# Patient Record
Sex: MALE | Race: WHITE | NOT HISPANIC OR LATINO | Employment: FULL TIME | URBAN - METROPOLITAN AREA
[De-identification: names, ages, dates, MRNs, and addresses within clinical notes are randomized per-mention and may not be internally consistent; named-entity substitution may affect disease eponyms.]

---

## 2017-02-23 ENCOUNTER — RESULTS ENCOUNTER (OUTPATIENT)
Dept: FAMILY MEDICINE CLINIC | Facility: CLINIC | Age: 42
End: 2017-02-23

## 2017-02-23 DIAGNOSIS — R73.01 IFG (IMPAIRED FASTING GLUCOSE): ICD-10-CM

## 2017-02-23 DIAGNOSIS — E55.9 VITAMIN D DEFICIENCY: ICD-10-CM

## 2017-02-23 DIAGNOSIS — M1A.0710 IDIOPATHIC CHRONIC GOUT OF RIGHT FOOT WITHOUT TOPHUS: ICD-10-CM

## 2017-06-22 RX ORDER — ALLOPURINOL 300 MG/1
TABLET ORAL
Qty: 60 TABLET | Refills: 0 | OUTPATIENT
Start: 2017-06-22

## 2017-07-14 RX ORDER — ALLOPURINOL 300 MG/1
300 TABLET ORAL 2 TIMES DAILY
Qty: 60 TABLET | Refills: 0 | Status: SHIPPED | OUTPATIENT
Start: 2017-07-14 | End: 2017-08-01 | Stop reason: SDUPTHER

## 2017-07-25 LAB
25(OH)D3+25(OH)D2 SERPL-MCNC: 38.7 NG/ML (ref 30–100)
BUN SERPL-MCNC: 12 MG/DL (ref 6–20)
BUN/CREAT SERPL: 14.1 (ref 7–25)
CALCIUM SERPL-MCNC: 10.3 MG/DL (ref 8.6–10.5)
CHLORIDE SERPL-SCNC: 101 MMOL/L (ref 98–107)
CO2 SERPL-SCNC: 26.4 MMOL/L (ref 22–29)
CREAT SERPL-MCNC: 0.85 MG/DL (ref 0.76–1.27)
GLUCOSE SERPL-MCNC: 103 MG/DL (ref 65–99)
HBA1C MFR BLD: 4.8 % (ref 4.8–5.6)
POTASSIUM SERPL-SCNC: 4.2 MMOL/L (ref 3.5–5.2)
SODIUM SERPL-SCNC: 142 MMOL/L (ref 136–145)
URATE SERPL-MCNC: 6.6 MG/DL (ref 3.4–7)

## 2017-08-01 ENCOUNTER — OFFICE VISIT (OUTPATIENT)
Dept: FAMILY MEDICINE CLINIC | Facility: CLINIC | Age: 42
End: 2017-08-01

## 2017-08-01 VITALS
TEMPERATURE: 98.5 F | SYSTOLIC BLOOD PRESSURE: 126 MMHG | WEIGHT: 262 LBS | HEART RATE: 75 BPM | DIASTOLIC BLOOD PRESSURE: 80 MMHG | HEIGHT: 68 IN | BODY MASS INDEX: 39.71 KG/M2 | OXYGEN SATURATION: 98 %

## 2017-08-01 DIAGNOSIS — E55.9 VITAMIN D DEFICIENCY: ICD-10-CM

## 2017-08-01 DIAGNOSIS — R73.01 IFG (IMPAIRED FASTING GLUCOSE): ICD-10-CM

## 2017-08-01 DIAGNOSIS — M1A.0710 IDIOPATHIC CHRONIC GOUT OF RIGHT FOOT WITHOUT TOPHUS: Primary | ICD-10-CM

## 2017-08-01 PROCEDURE — 99214 OFFICE O/P EST MOD 30 MIN: CPT | Performed by: FAMILY MEDICINE

## 2017-08-01 RX ORDER — ALLOPURINOL 300 MG/1
300 TABLET ORAL 2 TIMES DAILY
Qty: 180 TABLET | Refills: 3 | Status: SHIPPED | OUTPATIENT
Start: 2017-08-01 | End: 2017-12-20 | Stop reason: SDUPTHER

## 2017-08-01 NOTE — PROGRESS NOTES
Subjective   Wang Tee is a 42 y.o. male.   Chief Complaint   Patient presents with   • Gout   • Vitamin D Deficiency   • Hyperglycemia       History of Present Illness     #1 gout-last episode in 2013. No more episodes since he started allopurinol. No flareups since last office visit.  He is on allopurinol 300 mg twice a day. He takes it everyday. He reports no side effects.  Uric acid at 6.6.     #1 vitamin D deficiency-patient is on vitamin D 3 at 2000 units a day.  Vitamin D is at 38.7.     #3 impaired fasting glucose-fasting blood sugar 103.  A1c at 4.8.   Patient lost 4 pounds from last office visit.  He did it by decreasing amount of bread and pasta in his diet.  He exercises twice a week for 15 minutes on stationary bike.    The following portions of the patient's history were reviewed and updated as appropriate: allergies, current medications, past family history, past medical history, past social history, past surgical history and problem list.    Review of Systems   Constitutional: Negative.    Respiratory: Negative.    Cardiovascular: Negative.    Musculoskeletal: Negative for arthralgias.         Objective   Wt Readings from Last 3 Encounters:   08/01/17 262 lb (119 kg)   08/23/16 268 lb (122 kg)   05/03/16 255 lb (116 kg)      Vitals:    08/01/17 1252   BP: 126/80   Pulse: 75   Temp: 98.5 °F (36.9 °C)   SpO2: 98%     Temp Readings from Last 3 Encounters:   08/01/17 98.5 °F (36.9 °C)   08/23/16 98.4 °F (36.9 °C)   05/03/16 98.5 °F (36.9 °C)     BP Readings from Last 3 Encounters:   08/01/17 126/80   08/23/16 122/68   05/03/16 120/60     Pulse Readings from Last 3 Encounters:   08/01/17 75   08/23/16 75   05/03/16 105       Physical Exam   Constitutional: He is oriented to person, place, and time. He appears well-developed and well-nourished.   Obese.   HENT:   Head: Normocephalic and atraumatic.   Neck: Neck supple. Carotid bruit is not present. No thyromegaly present.   Cardiovascular: Normal  rate, regular rhythm and normal heart sounds.    Pulmonary/Chest: Effort normal and breath sounds normal.   Neurological: He is alert and oriented to person, place, and time.   Skin: Skin is warm, dry and intact.   Psychiatric: He has a normal mood and affect. His behavior is normal.       Assessment/Plan   Wang was seen today for gout, vitamin d deficiency and hyperglycemia.    Diagnoses and all orders for this visit:    Idiopathic chronic gout of right foot without tophus  -     CBC (No Diff); Future  -     Comprehensive Metabolic Panel; Future  -     Thyroid Cascade Profile; Future    IFG (impaired fasting glucose)  -     CBC (No Diff); Future  -     Comprehensive Metabolic Panel; Future  -     Lipid Panel With LDL / HDL Ratio; Future  -     Thyroid Cascade Profile; Future  -     Hemoglobin A1c; Future    Vitamin D deficiency    Other orders  -     allopurinol (ZYLOPRIM) 300 MG tablet; Take 1 tablet by mouth 2 (Two) Times a Day.        #1 gout-will continue current treatment.  Follow-up in 12 months.      #2 impaired fasting glucose-good job with weight loss, continue to work on it.  Increase exercise to at least 5 days a week for 30 minutes.  Follow-up in 6 months.  Labs before office visit.      #3 vitamin D deficiency-controlled.  Continue same.  Follow-up in 6-12 months.

## 2017-12-20 RX ORDER — ALLOPURINOL 300 MG/1
TABLET ORAL
Qty: 180 TABLET | Refills: 0 | Status: ON HOLD | OUTPATIENT
Start: 2017-12-20 | End: 2021-11-05

## 2018-02-01 ENCOUNTER — RESULTS ENCOUNTER (OUTPATIENT)
Dept: FAMILY MEDICINE CLINIC | Facility: CLINIC | Age: 43
End: 2018-02-01

## 2018-02-01 DIAGNOSIS — R73.01 IFG (IMPAIRED FASTING GLUCOSE): ICD-10-CM

## 2018-02-01 DIAGNOSIS — M1A.0710 IDIOPATHIC CHRONIC GOUT OF RIGHT FOOT WITHOUT TOPHUS: ICD-10-CM

## 2018-02-09 ENCOUNTER — HOSPITAL ENCOUNTER (OUTPATIENT)
Dept: GENERAL RADIOLOGY | Facility: HOSPITAL | Age: 43
Discharge: HOME | End: 2018-02-09
Admitting: FAMILY MEDICINE

## 2018-02-09 ENCOUNTER — OFFICE VISIT (OUTPATIENT)
Dept: INTERNAL MEDICINE | Facility: CLINIC | Age: 43
End: 2018-02-09

## 2018-02-09 VITALS
WEIGHT: 262 LBS | TEMPERATURE: 98 F | DIASTOLIC BLOOD PRESSURE: 84 MMHG | HEART RATE: 88 BPM | HEIGHT: 68 IN | BODY MASS INDEX: 39.71 KG/M2 | SYSTOLIC BLOOD PRESSURE: 130 MMHG | OXYGEN SATURATION: 98 %

## 2018-02-09 DIAGNOSIS — R06.02 SOB (SHORTNESS OF BREATH): ICD-10-CM

## 2018-02-09 DIAGNOSIS — R06.02 SOB (SHORTNESS OF BREATH): Primary | ICD-10-CM

## 2018-02-09 PROCEDURE — 99213 OFFICE O/P EST LOW 20 MIN: CPT | Performed by: FAMILY MEDICINE

## 2018-02-09 PROCEDURE — 71046 X-RAY EXAM CHEST 2 VIEWS: CPT

## 2018-02-09 NOTE — PROGRESS NOTES
Subjective   Wang Tee is a 42 y.o. male.   Chief Complaint   Patient presents with   • Shortness of Breath       History of Present Illness     #1 SOB-started about a month ago.  It occurs only while patient sits.  It is not associated with exertion.  It lasts minutes.  He feels that he cannot catch his breath.  It is hard to take a deep breath.  There is no cough, no wheezing associated with it, no chest pain, no palpitations, no dizziness.  He has no nasal congestion, no fever.  He has mildly runny nose with clear discharge.  He has no history of asthma.  Does not smoke cigarettes.  He sees his psychiatrist regularly.  No recent adjustment in medications except of decrease in dose of Risperdal from 2 to 1 mg a day. He does not feel anxious.    The following portions of the patient's history were reviewed and updated as appropriate: allergies, current medications, past family history, past medical history, past social history, past surgical history and problem list.    Review of Systems   Constitutional: Negative for chills and fever.   HENT: Positive for rhinorrhea. Negative for congestion, postnasal drip and sore throat.    Respiratory: Positive for shortness of breath. Negative for cough and wheezing.    Cardiovascular: Negative.          Objective   Wt Readings from Last 3 Encounters:   02/09/18 119 kg (262 lb)   08/01/17 119 kg (262 lb)   08/23/16 122 kg (268 lb)      Vitals:    02/09/18 1543   BP: 130/84   Pulse: 88   Temp: 98 °F (36.7 °C)   SpO2: 98%     Temp Readings from Last 3 Encounters:   02/09/18 98 °F (36.7 °C)   08/01/17 98.5 °F (36.9 °C)   08/23/16 98.4 °F (36.9 °C)     BP Readings from Last 3 Encounters:   02/09/18 130/84   08/01/17 126/80   08/23/16 122/68     Pulse Readings from Last 3 Encounters:   02/09/18 88   08/01/17 75   08/23/16 75       Physical Exam   Constitutional: He is oriented to person, place, and time. He appears well-developed and well-nourished.   HENT:   Head:  Normocephalic and atraumatic.   Right Ear: Tympanic membrane, external ear and ear canal normal.   Left Ear: Tympanic membrane, external ear and ear canal normal.   Nose: Nose normal.   Mouth/Throat: No posterior oropharyngeal erythema.   Neck: Neck supple. Carotid bruit is not present. No thyromegaly present.   Cardiovascular: Normal rate, regular rhythm and normal heart sounds.    Pulmonary/Chest: Effort normal and breath sounds normal.   Lymphadenopathy:     He has no cervical adenopathy.   Neurological: He is alert and oriented to person, place, and time.   Skin: Skin is warm, dry and intact.   Psychiatric: He has a normal mood and affect. His behavior is normal.       Assessment/Plan   Wang was seen today for shortness of breath.    Diagnoses and all orders for this visit:    SOB (shortness of breath)  -     XR Chest PA & Lateral; Future        #1 SOB - spirometry in the office negative except of restriction which most likely secondary to body habitus.  We are checking chest x-ray.  If labs are negative  patient will talk to his psychiatrist about psychotherapy for anxiety.

## 2018-02-14 DIAGNOSIS — R06.02 SOB (SHORTNESS OF BREATH): ICD-10-CM

## 2018-02-14 PROCEDURE — 94010 BREATHING CAPACITY TEST: CPT | Performed by: FAMILY MEDICINE

## 2018-02-26 ENCOUNTER — APPOINTMENT (OUTPATIENT)
Dept: GENERAL RADIOLOGY | Facility: HOSPITAL | Age: 43
End: 2018-02-26

## 2018-02-26 ENCOUNTER — HOSPITAL ENCOUNTER (EMERGENCY)
Facility: HOSPITAL | Age: 43
Discharge: HOME | End: 2018-02-26
Attending: EMERGENCY MEDICINE | Admitting: EMERGENCY MEDICINE

## 2018-02-26 ENCOUNTER — APPOINTMENT (OUTPATIENT)
Dept: CT IMAGING | Facility: HOSPITAL | Age: 43
End: 2018-02-26

## 2018-02-26 VITALS
WEIGHT: 250 LBS | HEIGHT: 68 IN | DIASTOLIC BLOOD PRESSURE: 92 MMHG | HEART RATE: 86 BPM | BODY MASS INDEX: 37.89 KG/M2 | OXYGEN SATURATION: 96 % | TEMPERATURE: 98.1 F | RESPIRATION RATE: 20 BRPM | SYSTOLIC BLOOD PRESSURE: 136 MMHG

## 2018-02-26 DIAGNOSIS — R06.02 SHORTNESS OF BREATH: Primary | ICD-10-CM

## 2018-02-26 DIAGNOSIS — R06.00 DYSPNEA, UNSPECIFIED TYPE: Primary | ICD-10-CM

## 2018-02-26 LAB
ALBUMIN SERPL-MCNC: 4.8 G/DL (ref 3.5–5.2)
ALBUMIN/GLOB SERPL: 1.7 G/DL
ALP SERPL-CCNC: 67 U/L (ref 39–117)
ALT SERPL W P-5'-P-CCNC: 52 U/L (ref 1–41)
ANION GAP SERPL CALCULATED.3IONS-SCNC: 12 MMOL/L
AST SERPL-CCNC: 24 U/L (ref 1–40)
BASOPHILS # BLD AUTO: 0.01 10*3/MM3 (ref 0–0.2)
BASOPHILS NFR BLD AUTO: 0.1 % (ref 0–1.5)
BILIRUB SERPL-MCNC: 1.1 MG/DL (ref 0.1–1.2)
BUN BLD-MCNC: 11 MG/DL (ref 6–20)
BUN/CREAT SERPL: 14.5 (ref 7–25)
CALCIUM SPEC-SCNC: 9.9 MG/DL (ref 8.6–10.5)
CHLORIDE SERPL-SCNC: 96 MMOL/L (ref 98–107)
CO2 SERPL-SCNC: 27 MMOL/L (ref 22–29)
CREAT BLD-MCNC: 0.76 MG/DL (ref 0.76–1.27)
DEPRECATED RDW RBC AUTO: 44.4 FL (ref 37–54)
EOSINOPHIL # BLD AUTO: 0.01 10*3/MM3 (ref 0–0.7)
EOSINOPHIL NFR BLD AUTO: 0.1 % (ref 0.3–6.2)
ERYTHROCYTE [DISTWIDTH] IN BLOOD BY AUTOMATED COUNT: 13.6 % (ref 11.5–14.5)
GFR SERPL CREATININE-BSD FRML MDRD: 113 ML/MIN/1.73
GLOBULIN UR ELPH-MCNC: 2.9 GM/DL
GLUCOSE BLD-MCNC: 180 MG/DL (ref 65–99)
HCT VFR BLD AUTO: 44.7 % (ref 40.4–52.2)
HGB BLD-MCNC: 14.8 G/DL (ref 13.7–17.6)
HOLD SPECIMEN: NORMAL
HOLD SPECIMEN: NORMAL
IMM GRANULOCYTES # BLD: 0.03 10*3/MM3 (ref 0–0.03)
IMM GRANULOCYTES NFR BLD: 0.3 % (ref 0–0.5)
LYMPHOCYTES # BLD AUTO: 1.32 10*3/MM3 (ref 0.9–4.8)
LYMPHOCYTES NFR BLD AUTO: 14.7 % (ref 19.6–45.3)
MCH RBC QN AUTO: 30.4 PG (ref 27–32.7)
MCHC RBC AUTO-ENTMCNC: 33.1 G/DL (ref 32.6–36.4)
MCV RBC AUTO: 91.8 FL (ref 79.8–96.2)
MONOCYTES # BLD AUTO: 0.4 10*3/MM3 (ref 0.2–1.2)
MONOCYTES NFR BLD AUTO: 4.5 % (ref 5–12)
NEUTROPHILS # BLD AUTO: 7.21 10*3/MM3 (ref 1.9–8.1)
NEUTROPHILS NFR BLD AUTO: 80.3 % (ref 42.7–76)
NT-PROBNP SERPL-MCNC: 14.4 PG/ML (ref 5–450)
PLATELET # BLD AUTO: 260 10*3/MM3 (ref 140–500)
PMV BLD AUTO: 9.1 FL (ref 6–12)
POTASSIUM BLD-SCNC: 3.9 MMOL/L (ref 3.5–5.2)
PROT SERPL-MCNC: 7.7 G/DL (ref 6–8.5)
RBC # BLD AUTO: 4.87 10*6/MM3 (ref 4.6–6)
SODIUM BLD-SCNC: 135 MMOL/L (ref 136–145)
TROPONIN T SERPL-MCNC: <0.01 NG/ML (ref 0–0.03)
WBC NRBC COR # BLD: 8.98 10*3/MM3 (ref 4.5–10.7)
WHOLE BLOOD HOLD SPECIMEN: NORMAL
WHOLE BLOOD HOLD SPECIMEN: NORMAL

## 2018-02-26 PROCEDURE — 93005 ELECTROCARDIOGRAM TRACING: CPT

## 2018-02-26 PROCEDURE — 36415 COLL VENOUS BLD VENIPUNCTURE: CPT | Performed by: EMERGENCY MEDICINE

## 2018-02-26 PROCEDURE — 0 IOPAMIDOL PER 1 ML: Performed by: EMERGENCY MEDICINE

## 2018-02-26 PROCEDURE — 85025 COMPLETE CBC W/AUTO DIFF WBC: CPT | Performed by: EMERGENCY MEDICINE

## 2018-02-26 PROCEDURE — 83880 ASSAY OF NATRIURETIC PEPTIDE: CPT | Performed by: EMERGENCY MEDICINE

## 2018-02-26 PROCEDURE — 80053 COMPREHEN METABOLIC PANEL: CPT | Performed by: EMERGENCY MEDICINE

## 2018-02-26 PROCEDURE — 99284 EMERGENCY DEPT VISIT MOD MDM: CPT

## 2018-02-26 PROCEDURE — 71046 X-RAY EXAM CHEST 2 VIEWS: CPT

## 2018-02-26 PROCEDURE — 94640 AIRWAY INHALATION TREATMENT: CPT

## 2018-02-26 PROCEDURE — 71275 CT ANGIOGRAPHY CHEST: CPT

## 2018-02-26 PROCEDURE — 84484 ASSAY OF TROPONIN QUANT: CPT | Performed by: EMERGENCY MEDICINE

## 2018-02-26 RX ORDER — ALBUTEROL SULFATE 2.5 MG/3ML
2.5 SOLUTION RESPIRATORY (INHALATION) ONCE
Status: COMPLETED | OUTPATIENT
Start: 2018-02-26 | End: 2018-02-26

## 2018-02-26 RX ORDER — SODIUM CHLORIDE 0.9 % (FLUSH) 0.9 %
10 SYRINGE (ML) INJECTION AS NEEDED
Status: DISCONTINUED | OUTPATIENT
Start: 2018-02-26 | End: 2018-02-27 | Stop reason: HOSPADM

## 2018-02-26 RX ORDER — ALBUTEROL SULFATE 90 UG/1
2 AEROSOL, METERED RESPIRATORY (INHALATION) EVERY 4 HOURS PRN
Qty: 1 INHALER | Refills: 0 | Status: ON HOLD | OUTPATIENT
Start: 2018-02-26 | End: 2021-11-05

## 2018-02-26 RX ADMIN — ALBUTEROL SULFATE 2.5 MG: 2.5 SOLUTION RESPIRATORY (INHALATION) at 22:10

## 2018-02-26 RX ADMIN — IOPAMIDOL 95 ML: 755 INJECTION, SOLUTION INTRAVENOUS at 21:56

## 2018-02-28 ENCOUNTER — TELEPHONE (OUTPATIENT)
Dept: INTERNAL MEDICINE | Facility: CLINIC | Age: 43
End: 2018-02-28

## 2018-02-28 NOTE — TELEPHONE ENCOUNTER
----- Message from Blanche Solares MD sent at 2/23/2018 12:30 PM EST -----  I'm not sure if ENT would be the most helpful person with his problems.  If he wants to see a specialist I think that more helpful will be pulmonologist. He also should see his psychiatrist because uncontrolled anxiety can cause similar symptoms and his medications were recently reduced.  ----- Message -----     From: Genesis Sotelo     Sent: 2/23/2018   8:38 AM       To: Blanche Solares MD    Patients mother Sheri is calling stating that patient is still having difficulty breathing and wants to know what they should do. She is wondering if you should refer him to an ENT. ENT that is in his insurance network is Dr Rashad Mckeon    DISCUSSED WITH MOTHER AND FATHER

## 2018-04-16 DIAGNOSIS — R06.02 SHORTNESS OF BREATH: Primary | ICD-10-CM

## 2018-10-31 ENCOUNTER — APPOINTMENT (OUTPATIENT)
Dept: GENERAL RADIOLOGY | Facility: HOSPITAL | Age: 43
End: 2018-10-31

## 2018-10-31 ENCOUNTER — HOSPITAL ENCOUNTER (EMERGENCY)
Facility: HOSPITAL | Age: 43
Discharge: HOME | End: 2018-10-31
Attending: EMERGENCY MEDICINE | Admitting: EMERGENCY MEDICINE

## 2018-10-31 VITALS
TEMPERATURE: 99.3 F | BODY MASS INDEX: 34.4 KG/M2 | SYSTOLIC BLOOD PRESSURE: 122 MMHG | HEIGHT: 68 IN | WEIGHT: 227 LBS | DIASTOLIC BLOOD PRESSURE: 90 MMHG | HEART RATE: 90 BPM | RESPIRATION RATE: 18 BRPM | OXYGEN SATURATION: 96 %

## 2018-10-31 DIAGNOSIS — Z43.0 ENCOUNTER FOR ATTENTION TO TRACHEOSTOMY (HCC): ICD-10-CM

## 2018-10-31 DIAGNOSIS — R04.2 HEMOPTYSIS: ICD-10-CM

## 2018-10-31 DIAGNOSIS — R06.00 DYSPNEA, UNSPECIFIED TYPE: Primary | ICD-10-CM

## 2018-10-31 LAB
ALBUMIN SERPL-MCNC: 4.5 G/DL (ref 3.5–5.2)
ALBUMIN/GLOB SERPL: 1.6 G/DL
ALP SERPL-CCNC: 83 U/L (ref 39–117)
ALT SERPL W P-5'-P-CCNC: 136 U/L (ref 1–41)
ANION GAP SERPL CALCULATED.3IONS-SCNC: 14.3 MMOL/L
AST SERPL-CCNC: 131 U/L (ref 1–40)
BASOPHILS # BLD AUTO: 0.01 10*3/MM3 (ref 0–0.2)
BASOPHILS NFR BLD AUTO: 0.1 % (ref 0–1.5)
BILIRUB SERPL-MCNC: 1.7 MG/DL (ref 0.1–1.2)
BUN BLD-MCNC: 12 MG/DL (ref 6–20)
BUN/CREAT SERPL: 14.6 (ref 7–25)
CALCIUM SPEC-SCNC: 9.9 MG/DL (ref 8.6–10.5)
CHLORIDE SERPL-SCNC: 99 MMOL/L (ref 98–107)
CO2 SERPL-SCNC: 25.7 MMOL/L (ref 22–29)
CREAT BLD-MCNC: 0.82 MG/DL (ref 0.76–1.27)
DEPRECATED RDW RBC AUTO: 41.8 FL (ref 37–54)
EOSINOPHIL # BLD AUTO: 0.24 10*3/MM3 (ref 0–0.7)
EOSINOPHIL NFR BLD AUTO: 2.5 % (ref 0.3–6.2)
ERYTHROCYTE [DISTWIDTH] IN BLOOD BY AUTOMATED COUNT: 13.2 % (ref 11.5–14.5)
GFR SERPL CREATININE-BSD FRML MDRD: 103 ML/MIN/1.73
GLOBULIN UR ELPH-MCNC: 2.9 GM/DL
GLUCOSE BLD-MCNC: 114 MG/DL (ref 65–99)
HCT VFR BLD AUTO: 43 % (ref 40.4–52.2)
HGB BLD-MCNC: 14.7 G/DL (ref 13.7–17.6)
IMM GRANULOCYTES # BLD: 0.02 10*3/MM3 (ref 0–0.03)
IMM GRANULOCYTES NFR BLD: 0.2 % (ref 0–0.5)
LITHIUM SERPL-SCNC: 0.8 MMOL/L (ref 0.6–1.2)
LYMPHOCYTES # BLD AUTO: 1.38 10*3/MM3 (ref 0.9–4.8)
LYMPHOCYTES NFR BLD AUTO: 14.2 % (ref 19.6–45.3)
MCH RBC QN AUTO: 29.9 PG (ref 27–32.7)
MCHC RBC AUTO-ENTMCNC: 34.2 G/DL (ref 32.6–36.4)
MCV RBC AUTO: 87.6 FL (ref 79.8–96.2)
MONOCYTES # BLD AUTO: 0.81 10*3/MM3 (ref 0.2–1.2)
MONOCYTES NFR BLD AUTO: 8.4 % (ref 5–12)
NEUTROPHILS # BLD AUTO: 7.26 10*3/MM3 (ref 1.9–8.1)
NEUTROPHILS NFR BLD AUTO: 74.8 % (ref 42.7–76)
PLATELET # BLD AUTO: 280 10*3/MM3 (ref 140–500)
PMV BLD AUTO: 8.8 FL (ref 6–12)
POTASSIUM BLD-SCNC: 3.6 MMOL/L (ref 3.5–5.2)
PROT SERPL-MCNC: 7.4 G/DL (ref 6–8.5)
RBC # BLD AUTO: 4.91 10*6/MM3 (ref 4.6–6)
SODIUM BLD-SCNC: 139 MMOL/L (ref 136–145)
WBC NRBC COR # BLD: 9.7 10*3/MM3 (ref 4.5–10.7)

## 2018-10-31 PROCEDURE — 99285 EMERGENCY DEPT VISIT HI MDM: CPT

## 2018-10-31 PROCEDURE — 80178 ASSAY OF LITHIUM: CPT | Performed by: EMERGENCY MEDICINE

## 2018-10-31 PROCEDURE — 71045 X-RAY EXAM CHEST 1 VIEW: CPT

## 2018-10-31 PROCEDURE — 85025 COMPLETE CBC W/AUTO DIFF WBC: CPT | Performed by: EMERGENCY MEDICINE

## 2018-10-31 PROCEDURE — 94799 UNLISTED PULMONARY SVC/PX: CPT

## 2018-10-31 PROCEDURE — 80053 COMPREHEN METABOLIC PANEL: CPT | Performed by: EMERGENCY MEDICINE

## 2018-10-31 RX ORDER — SODIUM CHLORIDE 0.9 % (FLUSH) 0.9 %
10 SYRINGE (ML) INJECTION AS NEEDED
Status: DISCONTINUED | OUTPATIENT
Start: 2018-10-31 | End: 2018-10-31 | Stop reason: HOSPADM

## 2019-09-24 ENCOUNTER — TELEPHONE (OUTPATIENT)
Dept: INTERNAL MEDICINE | Facility: CLINIC | Age: 44
End: 2019-09-24

## 2019-09-24 DIAGNOSIS — R06.1 STRIDOR: ICD-10-CM

## 2019-09-24 DIAGNOSIS — R06.09 OTHER FORM OF DYSPNEA: ICD-10-CM

## 2019-09-24 DIAGNOSIS — J38.3 OTHER DISEASES OF VOCAL CORDS: Primary | ICD-10-CM

## 2019-09-24 NOTE — TELEPHONE ENCOUNTER
----- Message from Blanche Solares MD sent at 9/24/2019 12:58 PM EDT -----  Regarding: RE: REFERRAL  Ok to RF.  ----- Message -----  From: Elizabeth Grimaldo MA  Sent: 9/24/2019   9:49 AM  To: Blanche Solares MD  Subject: FW: REFERRAL                                         ----- Message -----  From: Kristal Jolly  Sent: 9/24/2019   9:25 AM  To: Elizabeth Grimaldo MA  Subject: REFERRAL                                         Nolvia from [facility] in [place] needs a referral from Dr Solares to see Dr Madiha Richey (NPI# [id number]) for vocal cord dysfunction. Diagnosis codes are J38.3, R06.00 and R06.1.  Nolvia can be reached at [phone] if there are any questions. His insurance won't cover the appt without an referral from his PCP. Thanks

## 2020-04-15 ENCOUNTER — TELEPHONE (OUTPATIENT)
Dept: INTERNAL MEDICINE | Facility: CLINIC | Age: 45
End: 2020-04-15

## 2020-04-15 NOTE — TELEPHONE ENCOUNTER
Pts father/ Ghassan is trying to get patient into [facility] and needs a referral    Please advise    Call back  [phone]

## 2021-08-27 ENCOUNTER — TELEPHONE (OUTPATIENT)
Dept: INTERNAL MEDICINE | Facility: CLINIC | Age: 46
End: 2021-08-27

## 2021-08-27 NOTE — TELEPHONE ENCOUNTER
PATIENT CALLING STATING HE WAS EXPOSED TO SOMEONE THAT MAY BE POSITIVE FOR COVID OVER THE WEEKEND HE STATED THEY HAVE NOT GOTTEN THE TEST RESULTS YET HIS JOB IS WANTING TO KNOW IF HE NEEDS TO BE QUARANTINED.PATIENT IS SUPPOSE TO WORK TODAY     PLEASE ADVISE  [phone]

## 2021-11-05 ENCOUNTER — HOSPITAL ENCOUNTER (OUTPATIENT)
Facility: HOSPITAL | Age: 46
Setting detail: OBSERVATION
Discharge: HOME | End: 2021-11-06
Attending: EMERGENCY MEDICINE | Admitting: EMERGENCY MEDICINE

## 2021-11-05 ENCOUNTER — APPOINTMENT (OUTPATIENT)
Dept: GENERAL RADIOLOGY | Facility: HOSPITAL | Age: 46
End: 2021-11-05

## 2021-11-05 DIAGNOSIS — R07.9 CHEST PAIN, UNSPECIFIED TYPE: Primary | ICD-10-CM

## 2021-11-05 DIAGNOSIS — R94.31 ABNORMAL EKG: ICD-10-CM

## 2021-11-05 LAB
ALBUMIN SERPL-MCNC: 4.6 G/DL (ref 3.5–5.2)
ALBUMIN/GLOB SERPL: 1.6 G/DL
ALP SERPL-CCNC: 74 U/L (ref 39–117)
ALT SERPL W P-5'-P-CCNC: 51 U/L (ref 1–41)
ANION GAP SERPL CALCULATED.3IONS-SCNC: 7.2 MMOL/L (ref 5–15)
AST SERPL-CCNC: 28 U/L (ref 1–40)
BASOPHILS # BLD AUTO: 0.04 10*3/MM3 (ref 0–0.2)
BASOPHILS NFR BLD AUTO: 0.6 % (ref 0–1.5)
BILIRUB SERPL-MCNC: 1.5 MG/DL (ref 0–1.2)
BUN SERPL-MCNC: 14 MG/DL (ref 6–20)
BUN/CREAT SERPL: 15.2 (ref 7–25)
CALCIUM SPEC-SCNC: 9.7 MG/DL (ref 8.6–10.5)
CHLORIDE SERPL-SCNC: 100 MMOL/L (ref 98–107)
CO2 SERPL-SCNC: 28.8 MMOL/L (ref 22–29)
CREAT SERPL-MCNC: 0.92 MG/DL (ref 0.76–1.27)
DEPRECATED RDW RBC AUTO: 37.6 FL (ref 37–54)
EOSINOPHIL # BLD AUTO: 0.18 10*3/MM3 (ref 0–0.4)
EOSINOPHIL NFR BLD AUTO: 2.7 % (ref 0.3–6.2)
ERYTHROCYTE [DISTWIDTH] IN BLOOD BY AUTOMATED COUNT: 12.9 % (ref 12.3–15.4)
ERYTHROCYTE [SEDIMENTATION RATE] IN BLOOD: 1 MM/HR (ref 0–15)
GFR SERPL CREATININE-BSD FRML MDRD: 89 ML/MIN/1.73
GLOBULIN UR ELPH-MCNC: 2.8 GM/DL
GLUCOSE SERPL-MCNC: 90 MG/DL (ref 65–99)
HCT VFR BLD AUTO: 39.6 % (ref 37.5–51)
HGB BLD-MCNC: 14.4 G/DL (ref 13–17.7)
HOLD SPECIMEN: NORMAL
IMM GRANULOCYTES # BLD AUTO: 0.02 10*3/MM3 (ref 0–0.05)
IMM GRANULOCYTES NFR BLD AUTO: 0.3 % (ref 0–0.5)
INR PPP: 0.99 (ref 0.9–1.1)
LITHIUM SERPL-SCNC: 0.4 MMOL/L (ref 0.6–1.2)
LYMPHOCYTES # BLD AUTO: 1.77 10*3/MM3 (ref 0.7–3.1)
LYMPHOCYTES NFR BLD AUTO: 26.3 % (ref 19.6–45.3)
MCH RBC QN AUTO: 30.2 PG (ref 26.6–33)
MCHC RBC AUTO-ENTMCNC: 36.4 G/DL (ref 31.5–35.7)
MCV RBC AUTO: 83 FL (ref 79–97)
MONOCYTES # BLD AUTO: 0.67 10*3/MM3 (ref 0.1–0.9)
MONOCYTES NFR BLD AUTO: 9.9 % (ref 5–12)
NEUTROPHILS NFR BLD AUTO: 4.06 10*3/MM3 (ref 1.7–7)
NEUTROPHILS NFR BLD AUTO: 60.2 % (ref 42.7–76)
NRBC BLD AUTO-RTO: 0 /100 WBC (ref 0–0.2)
NT-PROBNP SERPL-MCNC: 34.5 PG/ML (ref 0–450)
PLATELET # BLD AUTO: 265 10*3/MM3 (ref 140–450)
PMV BLD AUTO: 8.9 FL (ref 6–12)
POTASSIUM SERPL-SCNC: 3.4 MMOL/L (ref 3.5–5.2)
PROT SERPL-MCNC: 7.4 G/DL (ref 6–8.5)
PROTHROMBIN TIME: 12.9 SECONDS (ref 11.7–14.2)
RBC # BLD AUTO: 4.77 10*6/MM3 (ref 4.14–5.8)
SARS-COV-2 RNA PNL SPEC NAA+PROBE: NOT DETECTED
SODIUM SERPL-SCNC: 136 MMOL/L (ref 136–145)
TROPONIN T SERPL-MCNC: <0.01 NG/ML (ref 0–0.03)
WBC # BLD AUTO: 6.74 10*3/MM3 (ref 3.4–10.8)
WHOLE BLOOD HOLD SPECIMEN: NORMAL
WHOLE BLOOD HOLD SPECIMEN: NORMAL

## 2021-11-05 PROCEDURE — 84484 ASSAY OF TROPONIN QUANT: CPT | Performed by: EMERGENCY MEDICINE

## 2021-11-05 PROCEDURE — 85652 RBC SED RATE AUTOMATED: CPT | Performed by: EMERGENCY MEDICINE

## 2021-11-05 PROCEDURE — 85610 PROTHROMBIN TIME: CPT | Performed by: EMERGENCY MEDICINE

## 2021-11-05 PROCEDURE — 84484 ASSAY OF TROPONIN QUANT: CPT | Performed by: NURSE PRACTITIONER

## 2021-11-05 PROCEDURE — 93010 ELECTROCARDIOGRAM REPORT: CPT | Performed by: INTERNAL MEDICINE

## 2021-11-05 PROCEDURE — G0378 HOSPITAL OBSERVATION PER HR: HCPCS

## 2021-11-05 PROCEDURE — 83880 ASSAY OF NATRIURETIC PEPTIDE: CPT | Performed by: NURSE PRACTITIONER

## 2021-11-05 PROCEDURE — 93005 ELECTROCARDIOGRAM TRACING: CPT | Performed by: NURSE PRACTITIONER

## 2021-11-05 PROCEDURE — 85025 COMPLETE CBC W/AUTO DIFF WBC: CPT | Performed by: EMERGENCY MEDICINE

## 2021-11-05 PROCEDURE — 71045 X-RAY EXAM CHEST 1 VIEW: CPT

## 2021-11-05 PROCEDURE — 80178 ASSAY OF LITHIUM: CPT | Performed by: EMERGENCY MEDICINE

## 2021-11-05 PROCEDURE — 87635 SARS-COV-2 COVID-19 AMP PRB: CPT | Performed by: EMERGENCY MEDICINE

## 2021-11-05 PROCEDURE — 99285 EMERGENCY DEPT VISIT HI MDM: CPT

## 2021-11-05 PROCEDURE — 80053 COMPREHEN METABOLIC PANEL: CPT | Performed by: EMERGENCY MEDICINE

## 2021-11-05 PROCEDURE — C9803 HOPD COVID-19 SPEC COLLECT: HCPCS

## 2021-11-05 PROCEDURE — 93005 ELECTROCARDIOGRAM TRACING: CPT | Performed by: EMERGENCY MEDICINE

## 2021-11-05 RX ORDER — LITHIUM CARBONATE 300 MG/1
600 TABLET, FILM COATED, EXTENDED RELEASE ORAL NIGHTLY
Status: DISCONTINUED | OUTPATIENT
Start: 2021-11-05 | End: 2021-11-06 | Stop reason: HOSPADM

## 2021-11-05 RX ORDER — SODIUM CHLORIDE 0.9 % (FLUSH) 0.9 %
10 SYRINGE (ML) INJECTION AS NEEDED
Status: DISCONTINUED | OUTPATIENT
Start: 2021-11-05 | End: 2021-11-06 | Stop reason: HOSPADM

## 2021-11-05 RX ORDER — SODIUM CHLORIDE 0.9 % (FLUSH) 0.9 %
10 SYRINGE (ML) INJECTION EVERY 12 HOURS SCHEDULED
Status: DISCONTINUED | OUTPATIENT
Start: 2021-11-05 | End: 2021-11-06 | Stop reason: HOSPADM

## 2021-11-05 RX ORDER — BENZTROPINE MESYLATE 0.5 MG/1
0.5 TABLET ORAL DAILY
Status: DISCONTINUED | OUTPATIENT
Start: 2021-11-06 | End: 2021-11-05

## 2021-11-05 RX ORDER — IBUPROFEN 800 MG/1
800 TABLET ORAL ONCE
Status: COMPLETED | OUTPATIENT
Start: 2021-11-05 | End: 2021-11-05

## 2021-11-05 RX ORDER — SODIUM CHLORIDE, SODIUM LACTATE, POTASSIUM CHLORIDE, CALCIUM CHLORIDE 600; 310; 30; 20 MG/100ML; MG/100ML; MG/100ML; MG/100ML
100 INJECTION, SOLUTION INTRAVENOUS CONTINUOUS
Status: DISCONTINUED | OUTPATIENT
Start: 2021-11-05 | End: 2021-11-06 | Stop reason: HOSPADM

## 2021-11-05 RX ORDER — RISPERIDONE 1 MG/1
1 TABLET ORAL DAILY
Status: DISCONTINUED | OUTPATIENT
Start: 2021-11-06 | End: 2021-11-05

## 2021-11-05 RX ORDER — COLCHICINE 0.6 MG/1
0.6 TABLET ORAL EVERY 12 HOURS SCHEDULED
Status: DISCONTINUED | OUTPATIENT
Start: 2021-11-05 | End: 2021-11-06 | Stop reason: HOSPADM

## 2021-11-05 RX ADMIN — SODIUM CHLORIDE, PRESERVATIVE FREE 10 ML: 5 INJECTION INTRAVENOUS at 22:49

## 2021-11-05 RX ADMIN — COLCHICINE 0.6 MG: 0.6 TABLET, FILM COATED ORAL at 22:48

## 2021-11-05 RX ADMIN — IBUPROFEN 800 MG: 800 TABLET, FILM COATED ORAL at 19:13

## 2021-11-05 RX ADMIN — SODIUM CHLORIDE, POTASSIUM CHLORIDE, SODIUM LACTATE AND CALCIUM CHLORIDE 100 ML/HR: 600; 310; 30; 20 INJECTION, SOLUTION INTRAVENOUS at 22:48

## 2021-11-05 RX ADMIN — LITHIUM CARBONATE 600 MG: 300 TABLET, FILM COATED, EXTENDED RELEASE ORAL at 22:49

## 2021-11-05 NOTE — ED TRIAGE NOTES
midsternal chest pain started last night, 324 mg ASA and 2 nitro with mild relief.    Pt arrives in triage with mask on. Triage staff wearing N95 masks and goggles.

## 2021-11-05 NOTE — PROGRESS NOTES
19:36 EDT    Very pleasant 46-year-old gentleman who presents with substernal chest pain to the emergency department.  He does not appear to have acute coronary syndrome, however his pattern of pain and subtle EKG changes would suggest pericarditis.  He is in no significant distress and has been treated with ibuprofen so far.  His cardiac enzymes have been negative to this point.    Decision was made to admit the patient to the observation unit for further evaluation and cardiology consult.  We will order an echocardiogram for tomorrow morning and will await recommendations from cardiology further.  He has not had a recent viral illness, but does have gout in his history.    We will try some colchicine for his symptoms which may be helpful in the setting of pericarditis.  We will await cardiology consult and recommendations.  He is hemodynamically stable.  He has a mature tracheostomy which is based on a history of vocal cord dysfunction.  He is in no respiratory distress and the trach is well-maintained and clean.

## 2021-11-05 NOTE — CASE MANAGEMENT/SOCIAL WORK
Discharge Planning Assessment  [facility]     Patient Name: Wang Tee  MRN: [id number]  Today's Date: 11/5/2021    Admit Date: 11/5/2021     Discharge Needs Assessment     Row Name 11/05/21 1900       Living Environment    Lives With parent(s)    Name(s) of Who Lives With Patient Mother, Latricia Tee [phone]    Current Living Arrangements home/apartment/condo    Primary Care Provided by self    Provides Primary Care For no one    Family Caregiver if Needed parent(s)    Family Caregiver Names Latricia Tee    Quality of Family Relationships helpful; involved    Able to Return to Prior Arrangements yes       Resource/Environmental Concerns    Resource/Environmental Concerns none    Transportation Concerns car, none       Transition Planning    Patient/Family Anticipates Transition to home    Patient/Family Anticipated Services at Transition none    Transportation Anticipated car, drives self; family or friend will provide       Discharge Needs Assessment    Readmission Within the Last 30 Days no previous admission in last 30 days    Equipment Currently Used at Home none    Concerns to be Addressed no discharge needs identified; denies needs/concerns at this time    Anticipated Changes Related to Illness none    Equipment Needed After Discharge none    Provided Post Acute Provider List? N/A    Provided Post Acute Provider Quality & Resource List? N/A               Discharge Plan     Row Name 11/05/21 1901       Plan    Plan Face sheet verified, role of CCP explained. Pt is independent in ADL's and does not require any assistive devices. Pt denies any difficulties paying for medicaitons    Provided Post Acute Provider List? N/A    Provided Post Acute Provider Quality & Resource List? N/A    Plan Comments Pt intends to return home upon discharge              Continued Care and Services - Admitted Since 11/5/2021    Coordination has not been started for this encounter.          Demographic Summary    No  documentation.                Functional Status    No documentation.                Psychosocial    No documentation.                Abuse/Neglect    No documentation.                Legal    No documentation.                Substance Abuse    No documentation.                Patient Forms    No documentation.                   Mili Henson RN

## 2021-11-05 NOTE — ED PROVIDER NOTES
EMERGENCY DEPARTMENT ENCOUNTER    Room Number:  17/17  Date of encounter:  11/5/2021  PCP: Blanche Solares MD  Historian: Patient and EMS      HPI:  Chief Complaint: Chest pain        Context: Wang Tee is a 46 y.o. male who presents to the ED c/o intermittent chest pain this morning that has been more constant since he went to work at approximately noon today.  The patient denies a history of heart disease, but does have bipolar disorder and a disorder of the vocal cords and is status post tracheostomy.  The patient describes the pain as a tightness in the center of his chest but denies radiation, diaphoresis, nausea or shortness of breath.  He denies a history of heart problems.  EMS gave the patient aspirin and 2 several nitroglycerin in route which the patient states may have helped with the tightness some.      PAST MEDICAL HISTORY  Active Ambulatory Problems     Diagnosis Date Noted   • Abnormal liver enzymes 05/03/2016   • Primary hypertriglyceridemia 05/03/2016   • Gout 05/03/2016   • Adiposity 05/03/2016   • Bipolar affective disorder (HCC) 05/03/2016   • Vitamin D deficiency 08/23/2016   • IFG (impaired fasting glucose) 08/23/2016     Resolved Ambulatory Problems     Diagnosis Date Noted   • No Resolved Ambulatory Problems     Past Medical History:   Diagnosis Date   • Abnormal serum thyroxine (T4) level    • Bipolar affective (CMS/HCC)    • Disorder of vocal cord    • Elevated liver enzymes          PAST SURGICAL HISTORY  Past Surgical History:   Procedure Laterality Date   • TRACHEOSTOMY           FAMILY HISTORY  Family History   Problem Relation Age of Onset   • Diabetes Mother    • Kidney disease Mother    • Anxiety disorder Other    • Bipolar disorder Other          SOCIAL HISTORY  Social History     Socioeconomic History   • Marital status: Single   Tobacco Use   • Smoking status: Never Smoker   • Smokeless tobacco: Never Used   Substance and Sexual Activity   • Alcohol use: No     Comment:  social   • Drug use: No   • Sexual activity: Defer         ALLERGIES  Sulfa antibiotics and Sulfamethoxazole-trimethoprim        REVIEW OF SYSTEMS  Review of Systems     Patient denies headache, sore throat, neck pain, back pain, abdominal pain, vomiting, diarrhea, lower extremity pain, lower extremity swelling or focal neuro deficit  All systems reviewed and negative except for those discussed in HPI.     PHYSICAL EXAM    I have reviewed the triage vital signs and nursing notes.    ED Triage Vitals [11/05/21 1626]   Temp Heart Rate Resp BP SpO2   98.1 °F (36.7 °C) 88 16 (!) 176/110 100 %      Temp src Heart Rate Source Patient Position BP Location FiO2 (%)   -- -- -- -- --       GENERAL: 46-year-old well developed, well nourished in no acute distress  HENT: NCAT, neck supple, trachea midline, tracheostomy in place and is clean and dry  EYES: no scleral icterus, PERRL, normal conjunctivae  CV: regular rhythm, regular rate, no murmur  RESPIRATORY: unlabored effort, CTAB  ABDOMEN: soft, nontender, nondistended, bowel sounds present  MUSCULOSKELETAL: no gross deformity, no pedal edema, no calf tenderness  NEURO: alert,  sensory and motor function of extremities intact, speech clear, mental status normal  SKIN: warm, dry, no rash  PSYCH:  Appropriate mood and affect      PPE  Pt does not present with symptoms for COVID19; however, I was wearing a 95 mask and goggles throughout all patient interaction.    Vital signs and nursing notes reviewed.      LAB RESULTS  Recent Results (from the past 24 hour(s))   ECG 12 Lead    Collection Time: 11/05/21  4:36 PM   Result Value Ref Range    QT Interval 378 ms   Comprehensive Metabolic Panel    Collection Time: 11/05/21  5:14 PM    Specimen: Blood   Result Value Ref Range    Glucose 90 65 - 99 mg/dL    BUN 14 6 - 20 mg/dL    Creatinine 0.92 0.76 - 1.27 mg/dL    Sodium 136 136 - 145 mmol/L    Potassium 3.4 (L) 3.5 - 5.2 mmol/L    Chloride 100 98 - 107 mmol/L    CO2 28.8 22.0 - 29.0  mmol/L    Calcium 9.7 8.6 - 10.5 mg/dL    Total Protein 7.4 6.0 - 8.5 g/dL    Albumin 4.60 3.50 - 5.20 g/dL    ALT (SGPT) 51 (H) 1 - 41 U/L    AST (SGOT) 28 1 - 40 U/L    Alkaline Phosphatase 74 39 - 117 U/L    Total Bilirubin 1.5 (H) 0.0 - 1.2 mg/dL    eGFR Non African Amer 89 >60 mL/min/1.73    Globulin 2.8 gm/dL    A/G Ratio 1.6 g/dL    BUN/Creatinine Ratio 15.2 7.0 - 25.0    Anion Gap 7.2 5.0 - 15.0 mmol/L   Troponin    Collection Time: 11/05/21  5:14 PM    Specimen: Blood   Result Value Ref Range    Troponin T <0.010 0.000 - 0.030 ng/mL   Protime-INR    Collection Time: 11/05/21  5:14 PM    Specimen: Blood   Result Value Ref Range    Protime 12.9 11.7 - 14.2 Seconds    INR 0.99 0.90 - 1.10   Green Top (Gel)    Collection Time: 11/05/21  5:14 PM   Result Value Ref Range    Extra Tube Hold for add-ons.    Lavender Top    Collection Time: 11/05/21  5:14 PM   Result Value Ref Range    Extra Tube hold for add-on    Light Blue Top    Collection Time: 11/05/21  5:14 PM   Result Value Ref Range    Extra Tube hold for add-on    CBC Auto Differential    Collection Time: 11/05/21  5:14 PM    Specimen: Blood   Result Value Ref Range    WBC 6.74 3.40 - 10.80 10*3/mm3    RBC 4.77 4.14 - 5.80 10*6/mm3    Hemoglobin 14.4 13.0 - 17.7 g/dL    Hematocrit 39.6 37.5 - 51.0 %    MCV 83.0 79.0 - 97.0 fL    MCH 30.2 26.6 - 33.0 pg    MCHC 36.4 (H) 31.5 - 35.7 g/dL    RDW 12.9 12.3 - 15.4 %    RDW-SD 37.6 37.0 - 54.0 fl    MPV 8.9 6.0 - 12.0 fL    Platelets 265 140 - 450 10*3/mm3    Neutrophil % 60.2 42.7 - 76.0 %    Lymphocyte % 26.3 19.6 - 45.3 %    Monocyte % 9.9 5.0 - 12.0 %    Eosinophil % 2.7 0.3 - 6.2 %    Basophil % 0.6 0.0 - 1.5 %    Immature Grans % 0.3 0.0 - 0.5 %    Neutrophils, Absolute 4.06 1.70 - 7.00 10*3/mm3    Lymphocytes, Absolute 1.77 0.70 - 3.10 10*3/mm3    Monocytes, Absolute 0.67 0.10 - 0.90 10*3/mm3    Eosinophils, Absolute 0.18 0.00 - 0.40 10*3/mm3    Basophils, Absolute 0.04 0.00 - 0.20 10*3/mm3    Immature  Grans, Absolute 0.02 0.00 - 0.05 10*3/mm3    nRBC 0.0 0.0 - 0.2 /100 WBC   [place] Level    Collection Time: 11/05/21  5:14 PM    Specimen: Blood   Result Value Ref Range    Lithium 0.4 (L) 0.6 - 1.2 mmol/L   Sedimentation Rate    Collection Time: 11/05/21  5:14 PM    Specimen: Blood   Result Value Ref Range    Sed Rate 1 0 - 15 mm/hr   ECG 12 Lead    Collection Time: 11/05/21  6:32 PM   Result Value Ref Range    QT Interval 397 ms       Ordered the above labs and independently reviewed the results.        RADIOLOGY  XR Chest 1 View    Result Date: 11/5/2021  XR CHEST 1 VW-  HISTORY: Male who is 46 years-old,  chest pain  TECHNIQUE: Frontal view of the chest  COMPARISON: 10/31/2018  FINDINGS: Stable appearing tracheostomy tube. Heart the heart size is borderline. Pulmonary vasculature is unremarkable. No focal pulmonary consolidation, pleural effusion, or pneumothorax. No acute osseous process.      No evidence for acute pulmonary process. Borderline heart size. Follow-up as clinical indications persist.  This report was finalized on 11/5/2021 5:42 PM by Dr. Jerome Porras M.D.        I ordered the above noted radiological studies. Independently reviewed by me and discussed with radiologist.  See dictation above for official radiology interpretation.      PROCEDURES    Procedures  EKG #1    EKG time: 1636  Rhythm/Rate: Normal sinus rhythm at 81  J-point elevation in anterior leads  No ST/reciprocal depression  Normal intervals  Borderline left axis deviation    Changed compared to prior on 2/26/2018    Interpreted Contemporaneously by me.  Independently viewed by me    EKG #2    EKG time: 1832  Rhythm/Rate: Normal sinus rhythm at 61  J-point elevation      Unchanged compared to prior 2 hours ago    Interpreted Contemporaneously by me.  Independently viewed by me      MEDICATIONS GIVEN IN ER    Medications   sodium chloride 0.9 % flush 10 mL (has no administration in time range)   ibuprofen (ADVIL,MOTRIN) tablet  800 mg (has no administration in time range)         PROGRESS, DATA ANALYSIS, CONSULTS, AND MEDICAL DECISION MAKING    All labs have been independently reviewed by me.  All radiology studies have been reviewed by me and discussed with radiologist dictating report.   EKG's independently reviewed by me.  Discussion below represents my analysis of pertinent findings related to patient's condition, differential diagnosis, treatment plan and final disposition.      ED Course as of 11/05/21 1852 Fri Nov 05, 2021   1800 On repeat examination advised the patient that his troponin and x-ray are negative.  He states he still feels like he has some tightness but he is improved.  I will repeat his EKG and he has a second troponin ordered. [GP]   1842 I discussed the case with Dr. Banerjee from cardiology.  He has reviewed the EKG and feels it is consistent with pericarditis.  He has asked that we treat the patient with high-dose ibuprofen and admit him to the observation unit for an echocardiogram tomorrow. [GP]   1850 Upon repeat examination I explained to the patient and his mother that we believe he likely has pericarditis and that we will admit him to the observation unit on Motrin and perform an echocardiogram tomorrow.  The patient understands and agrees with the plan. [GP]   1851 I discussed the case with Dr. Justice from the ED observation unit.  He will admit and is aware of my consultation with Dr. Banerjee. [GP]      ED Course User Index  [GP] Tom Lazo MD           The differential diagnosis includes but is not limited to myocardial infarction, acute coronary syndrome, pericarditis, chest wall pain, pneumonia, pulmonary embolism, pneumothorax, or esophageal spasm.        AS OF 18:52 EDT VITALS:    BP - 109/88  HR - 73  TEMP - 98.1 °F (36.7 °C)  02 SATS - 98%        DIAGNOSIS  Final diagnoses:   Chest pain, unspecified type-possible pericarditis   Abnormal EKG         DISPOSITION  ADMISSION    Discussed  treatment plan and reason for admission with pt/family and admitting physician.  Pt/family voiced understanding of the plan for admission for further testing/treatment as needed.        EMR Dragon/Transcription disclaimer:   Much of this encounter note is an electronic transcription/translation of spoken language to printed text.        Tom Lazo MD  11/05/21 9639

## 2021-11-06 ENCOUNTER — READMISSION MANAGEMENT (OUTPATIENT)
Dept: CALL CENTER | Facility: HOSPITAL | Age: 46
End: 2021-11-06

## 2021-11-06 ENCOUNTER — APPOINTMENT (OUTPATIENT)
Dept: CARDIOLOGY | Facility: HOSPITAL | Age: 46
End: 2021-11-06

## 2021-11-06 VITALS
BODY MASS INDEX: 30.75 KG/M2 | TEMPERATURE: 98.06 F | WEIGHT: 227 LBS | SYSTOLIC BLOOD PRESSURE: 138 MMHG | HEIGHT: 72 IN | OXYGEN SATURATION: 94 % | HEART RATE: 58 BPM | DIASTOLIC BLOOD PRESSURE: 91 MMHG | RESPIRATION RATE: 16 BRPM

## 2021-11-06 LAB
ANION GAP SERPL CALCULATED.3IONS-SCNC: 12.6 MMOL/L (ref 5–15)
AORTIC ARCH: 2.8 CM
AORTIC DIMENSIONLESS INDEX: 0.7 (DI)
ASCENDING AORTA: 3.8 CM
BH CV ECHO MEAS - ACS: 1.7 CM
BH CV ECHO MEAS - AO ACC TIME: 0.12 SEC
BH CV ECHO MEAS - AO MAX PG (FULL): 3.2 MMHG
BH CV ECHO MEAS - AO MAX PG: 7.5 MMHG
BH CV ECHO MEAS - AO MEAN PG (FULL): 1.9 MMHG
BH CV ECHO MEAS - AO MEAN PG: 4 MMHG
BH CV ECHO MEAS - AO ROOT AREA (BSA CORRECTED): 1.5
BH CV ECHO MEAS - AO ROOT AREA: 9 CM^2
BH CV ECHO MEAS - AO ROOT DIAM: 3.4 CM
BH CV ECHO MEAS - AO V2 MAX: 137.3 CM/SEC
BH CV ECHO MEAS - AO V2 MEAN: 95 CM/SEC
BH CV ECHO MEAS - AO V2 VTI: 32.8 CM
BH CV ECHO MEAS - ASC AORTA: 3.8 CM
BH CV ECHO MEAS - AVA(I,A): 2.5 CM^2
BH CV ECHO MEAS - AVA(I,D): 2.5 CM^2
BH CV ECHO MEAS - AVA(V,A): 2.7 CM^2
BH CV ECHO MEAS - AVA(V,D): 2.7 CM^2
BH CV ECHO MEAS - BSA(HAYCOCK): 2.3 M^2
BH CV ECHO MEAS - BSA: 2.2 M^2
BH CV ECHO MEAS - BZI_BMI: 31.1 KILOGRAMS/M^2
BH CV ECHO MEAS - BZI_METRIC_HEIGHT: 182 CM
BH CV ECHO MEAS - BZI_METRIC_WEIGHT: 103 KG
BH CV ECHO MEAS - EDV(CUBED): 87.4 ML
BH CV ECHO MEAS - EDV(MOD-SP2): 135 ML
BH CV ECHO MEAS - EDV(MOD-SP4): 133 ML
BH CV ECHO MEAS - EDV(TEICH): 89.5 ML
BH CV ECHO MEAS - EF(CUBED): 66.1 %
BH CV ECHO MEAS - EF(MOD-BP): 61 %
BH CV ECHO MEAS - EF(MOD-SP2): 61.5 %
BH CV ECHO MEAS - EF(MOD-SP4): 60.2 %
BH CV ECHO MEAS - EF(TEICH): 57.8 %
BH CV ECHO MEAS - ESV(CUBED): 29.7 ML
BH CV ECHO MEAS - ESV(MOD-SP2): 52 ML
BH CV ECHO MEAS - ESV(MOD-SP4): 53 ML
BH CV ECHO MEAS - ESV(TEICH): 37.8 ML
BH CV ECHO MEAS - FS: 30.2 %
BH CV ECHO MEAS - IVS/LVPW: 1
BH CV ECHO MEAS - IVSD: 1.4 CM
BH CV ECHO MEAS - LAT PEAK E' VEL: 10 CM/SEC
BH CV ECHO MEAS - LV DIASTOLIC VOL/BSA (35-75): 59.4 ML/M^2
BH CV ECHO MEAS - LV MASS(C)D: 244.5 GRAMS
BH CV ECHO MEAS - LV MASS(C)DI: 109.1 GRAMS/M^2
BH CV ECHO MEAS - LV MAX PG: 4.3 MMHG
BH CV ECHO MEAS - LV MEAN PG: 2.1 MMHG
BH CV ECHO MEAS - LV SYSTOLIC VOL/BSA (12-30): 23.7 ML/M^2
BH CV ECHO MEAS - LV V1 MAX: 104.1 CM/SEC
BH CV ECHO MEAS - LV V1 MEAN: 66.3 CM/SEC
BH CV ECHO MEAS - LV V1 VTI: 23.1 CM
BH CV ECHO MEAS - LVIDD: 4.4 CM
BH CV ECHO MEAS - LVIDS: 3.1 CM
BH CV ECHO MEAS - LVLD AP2: 9 CM
BH CV ECHO MEAS - LVLD AP4: 9.2 CM
BH CV ECHO MEAS - LVLS AP2: 7.3 CM
BH CV ECHO MEAS - LVLS AP4: 7.4 CM
BH CV ECHO MEAS - LVOT AREA (M): 3.5 CM^2
BH CV ECHO MEAS - LVOT AREA: 3.5 CM^2
BH CV ECHO MEAS - LVOT DIAM: 2.1 CM
BH CV ECHO MEAS - LVPWD: 1.4 CM
BH CV ECHO MEAS - MED PEAK E' VEL: 7.5 CM/SEC
BH CV ECHO MEAS - MV A DUR: 0.15 SEC
BH CV ECHO MEAS - MV A MAX VEL: 70.3 CM/SEC
BH CV ECHO MEAS - MV DEC SLOPE: 322.4 CM/SEC^2
BH CV ECHO MEAS - MV DEC TIME: 211 SEC
BH CV ECHO MEAS - MV E MAX VEL: 78.4 CM/SEC
BH CV ECHO MEAS - MV E/A: 1.1
BH CV ECHO MEAS - MV MAX PG: 2.5 MMHG
BH CV ECHO MEAS - MV MEAN PG: 1.1 MMHG
BH CV ECHO MEAS - MV P1/2T MAX VEL: 77.1 CM/SEC
BH CV ECHO MEAS - MV P1/2T: 70 MSEC
BH CV ECHO MEAS - MV V2 MAX: 79.8 CM/SEC
BH CV ECHO MEAS - MV V2 MEAN: 46.7 CM/SEC
BH CV ECHO MEAS - MV V2 VTI: 24.2 CM
BH CV ECHO MEAS - MVA P1/2T LCG: 2.9 CM^2
BH CV ECHO MEAS - MVA(P1/2T): 3.1 CM^2
BH CV ECHO MEAS - MVA(VTI): 3.4 CM^2
BH CV ECHO MEAS - PA ACC TIME: 0.14 SEC
BH CV ECHO MEAS - PA MAX PG (FULL): 1 MMHG
BH CV ECHO MEAS - PA MAX PG: 2.8 MMHG
BH CV ECHO MEAS - PA PR(ACCEL): 18 MMHG
BH CV ECHO MEAS - PA V2 MAX: 83.8 CM/SEC
BH CV ECHO MEAS - PI END-D VEL: 71.4 CM/SEC
BH CV ECHO MEAS - PULM A REVS DUR: 0.07 SEC
BH CV ECHO MEAS - PULM A REVS VEL: 32.6 CM/SEC
BH CV ECHO MEAS - PULM DIAS VEL: 37.7 CM/SEC
BH CV ECHO MEAS - PULM S/D: 1.3
BH CV ECHO MEAS - PULM SYS VEL: 48.8 CM/SEC
BH CV ECHO MEAS - PVA(V,A): 6.7 CM^2
BH CV ECHO MEAS - PVA(V,D): 6.7 CM^2
BH CV ECHO MEAS - QP/QS: 1.4
BH CV ECHO MEAS - RAP SYSTOLE: 3 MMHG
BH CV ECHO MEAS - RV MAX PG: 1.8 MMHG
BH CV ECHO MEAS - RV MEAN PG: 0.81 MMHG
BH CV ECHO MEAS - RV V1 MAX: 67.3 CM/SEC
BH CV ECHO MEAS - RV V1 MEAN: 41.7 CM/SEC
BH CV ECHO MEAS - RV V1 VTI: 13.2 CM
BH CV ECHO MEAS - RVOT AREA: 8.4 CM^2
BH CV ECHO MEAS - RVOT DIAM: 3.3 CM
BH CV ECHO MEAS - SI(AO): 131 ML/M^2
BH CV ECHO MEAS - SI(CUBED): 25.8 ML/M^2
BH CV ECHO MEAS - SI(LVOT): 36.6 ML/M^2
BH CV ECHO MEAS - SI(MOD-SP2): 37 ML/M^2
BH CV ECHO MEAS - SI(MOD-SP4): 35.7 ML/M^2
BH CV ECHO MEAS - SI(TEICH): 23.1 ML/M^2
BH CV ECHO MEAS - SV(AO): 293.5 ML
BH CV ECHO MEAS - SV(CUBED): 57.7 ML
BH CV ECHO MEAS - SV(LVOT): 81.9 ML
BH CV ECHO MEAS - SV(MOD-SP2): 83 ML
BH CV ECHO MEAS - SV(MOD-SP4): 80 ML
BH CV ECHO MEAS - SV(RVOT): 110.7 ML
BH CV ECHO MEAS - SV(TEICH): 51.7 ML
BH CV ECHO MEAS - TAPSE (>1.6): 2.3 CM
BH CV ECHO MEASUREMENTS AVERAGE E/E' RATIO: 8.96
BH CV XLRA - RV BASE: 3.6 CM
BH CV XLRA - RV LENGTH: 8.7 CM
BH CV XLRA - RV MID: 3.8 CM
BH CV XLRA - TDI S': 12.8 CM/SEC
BUN SERPL-MCNC: 15 MG/DL (ref 6–20)
BUN/CREAT SERPL: 16.3 (ref 7–25)
CALCIUM SPEC-SCNC: 9.5 MG/DL (ref 8.6–10.5)
CHLORIDE SERPL-SCNC: 105 MMOL/L (ref 98–107)
CHOLEST SERPL-MCNC: 145 MG/DL (ref 0–200)
CO2 SERPL-SCNC: 22.4 MMOL/L (ref 22–29)
CREAT SERPL-MCNC: 0.92 MG/DL (ref 0.76–1.27)
CRP SERPL-MCNC: <0.3 MG/DL (ref 0–0.5)
DEPRECATED RDW RBC AUTO: 37.2 FL (ref 37–54)
ERYTHROCYTE [DISTWIDTH] IN BLOOD BY AUTOMATED COUNT: 12.7 % (ref 12.3–15.4)
GFR SERPL CREATININE-BSD FRML MDRD: 89 ML/MIN/1.73
GLUCOSE SERPL-MCNC: 86 MG/DL (ref 65–99)
HCT VFR BLD AUTO: 38.5 % (ref 37.5–51)
HDLC SERPL-MCNC: 38 MG/DL (ref 40–60)
HGB BLD-MCNC: 13.7 G/DL (ref 13–17.7)
LDLC SERPL CALC-MCNC: 84 MG/DL (ref 0–100)
LDLC/HDLC SERPL: 2.15 {RATIO}
LEFT ATRIUM VOLUME INDEX: 24.6 ML/M2
MAGNESIUM SERPL-MCNC: 2 MG/DL (ref 1.6–2.6)
MAXIMAL PREDICTED HEART RATE: 175 BPM
MCH RBC QN AUTO: 29.7 PG (ref 26.6–33)
MCHC RBC AUTO-ENTMCNC: 35.6 G/DL (ref 31.5–35.7)
MCV RBC AUTO: 83.3 FL (ref 79–97)
PLATELET # BLD AUTO: 234 10*3/MM3 (ref 140–450)
PMV BLD AUTO: 8.8 FL (ref 6–12)
POTASSIUM SERPL-SCNC: 3.7 MMOL/L (ref 3.5–5.2)
QT INTERVAL: 376 MS
QT INTERVAL: 378 MS
QT INTERVAL: 397 MS
QT INTERVAL: 427 MS
RBC # BLD AUTO: 4.62 10*6/MM3 (ref 4.14–5.8)
SINUS: 3.6 CM
SODIUM SERPL-SCNC: 140 MMOL/L (ref 136–145)
STJ: 3 CM
STRESS TARGET HR: 149 BPM
TRIGL SERPL-MCNC: 126 MG/DL (ref 0–150)
VLDLC SERPL-MCNC: 23 MG/DL (ref 5–40)
WBC # BLD AUTO: 6.37 10*3/MM3 (ref 3.4–10.8)

## 2021-11-06 PROCEDURE — 99204 OFFICE O/P NEW MOD 45 MIN: CPT | Performed by: INTERNAL MEDICINE

## 2021-11-06 PROCEDURE — 83735 ASSAY OF MAGNESIUM: CPT | Performed by: NURSE PRACTITIONER

## 2021-11-06 PROCEDURE — 86140 C-REACTIVE PROTEIN: CPT | Performed by: INTERNAL MEDICINE

## 2021-11-06 PROCEDURE — 85027 COMPLETE CBC AUTOMATED: CPT | Performed by: NURSE PRACTITIONER

## 2021-11-06 PROCEDURE — 93005 ELECTROCARDIOGRAM TRACING: CPT | Performed by: NURSE PRACTITIONER

## 2021-11-06 PROCEDURE — 80061 LIPID PANEL: CPT | Performed by: INTERNAL MEDICINE

## 2021-11-06 PROCEDURE — 25010000002 PERFLUTREN (DEFINITY) 8.476 MG IN SODIUM CHLORIDE (PF) 0.9 % 10 ML INJECTION: Performed by: NURSE PRACTITIONER

## 2021-11-06 PROCEDURE — 93010 ELECTROCARDIOGRAM REPORT: CPT | Performed by: INTERNAL MEDICINE

## 2021-11-06 PROCEDURE — 80048 BASIC METABOLIC PNL TOTAL CA: CPT | Performed by: NURSE PRACTITIONER

## 2021-11-06 PROCEDURE — 93306 TTE W/DOPPLER COMPLETE: CPT | Performed by: INTERNAL MEDICINE

## 2021-11-06 PROCEDURE — G0378 HOSPITAL OBSERVATION PER HR: HCPCS

## 2021-11-06 PROCEDURE — 93306 TTE W/DOPPLER COMPLETE: CPT

## 2021-11-06 RX ORDER — IBUPROFEN 800 MG/1
800 TABLET ORAL 3 TIMES DAILY
Status: DISCONTINUED | OUTPATIENT
Start: 2021-11-06 | End: 2021-11-06 | Stop reason: HOSPADM

## 2021-11-06 RX ORDER — COLCHICINE 0.6 MG/1
0.6 TABLET ORAL DAILY
Qty: 30 TABLET | Refills: 0 | Status: SHIPPED | OUTPATIENT
Start: 2021-11-06 | End: 2021-12-06

## 2021-11-06 RX ORDER — IBUPROFEN 600 MG/1
600 TABLET ORAL EVERY 8 HOURS
Qty: 21 TABLET | Refills: 0 | Status: SHIPPED | OUTPATIENT
Start: 2021-11-06 | End: 2021-11-13

## 2021-11-06 RX ORDER — COLCHICINE 0.6 MG/1
0.6 TABLET ORAL DAILY
Qty: 30 TABLET | Refills: 0 | Status: SHIPPED | OUTPATIENT
Start: 2021-11-06 | End: 2021-11-06 | Stop reason: SDUPTHER

## 2021-11-06 RX ORDER — ACETAMINOPHEN 325 MG/1
650 TABLET ORAL EVERY 6 HOURS PRN
Status: DISCONTINUED | OUTPATIENT
Start: 2021-11-06 | End: 2021-11-06 | Stop reason: HOSPADM

## 2021-11-06 RX ADMIN — SODIUM CHLORIDE, POTASSIUM CHLORIDE, SODIUM LACTATE AND CALCIUM CHLORIDE 100 ML/HR: 600; 310; 30; 20 INJECTION, SOLUTION INTRAVENOUS at 09:07

## 2021-11-06 RX ADMIN — ACETAMINOPHEN 650 MG: 325 TABLET, FILM COATED ORAL at 00:54

## 2021-11-06 RX ADMIN — COLCHICINE 0.6 MG: 0.6 TABLET, FILM COATED ORAL at 10:57

## 2021-11-06 RX ADMIN — IBUPROFEN 800 MG: 800 TABLET, FILM COATED ORAL at 13:13

## 2021-11-06 RX ADMIN — PERFLUTREN 2 ML: 6.52 INJECTION, SUSPENSION INTRAVENOUS at 10:06

## 2021-11-06 NOTE — PLAN OF CARE
Goal Outcome Evaluation:        Pt here for complaints of chest pain. Troponin's have been negative, and per physicians note, EKG showed changes that appear to suggest that the pt may have pericarditis. Plans for echo in the morning. Cardiology has been consulted and will see pt in the morning for further planning. He has been NPO since midnight. Pt does have a trach that has been present for roughly 3 years due to vocal cord complications. It is well maintained by him. Vitals have been stable, normal sinus on the monitor.

## 2021-11-06 NOTE — CASE MANAGEMENT/SOCIAL WORK
Call received from Cristine- observation provider indicating that patients insurance isnt covering the Colchicine prescribed- call placed to patient's mother- () who advised out of pocket cost would be $150- obtained a cost in our pharmacy for $130- researched cost of medication through [facility] site- spoke w/Obdulia ([facility] pharmacy) to assist with coordinating transfer of RX to another pharmacy. Call placed to patients mother to review cost and identify which pharmacy they prefer RX to go to; Preference is [facility] [place]- mother advised she was able to print off coupon for [facility]- Bal advised they do have medication in stock and have received prescription- notified patient who will  medication tonight. Karol Gonzalez RN

## 2021-11-06 NOTE — CONSULTS
[facility] Consult Note       Encounter Date:21  Patient:Wang Tee  :1975  MRN:[id number]    Date of Admission: 2021  Date of Encounter Visit: 21  Encounter Provider: Johnny Franco MD  Referring Provider: Rufino Justice MD  Place of Service: [facility]  Patient Care Team:  Blanche Solares MD as PCP - General  Blanche Solares MD as PCP - Family Medicine      Consulted for: chest pain    Chief Complaint: chest pain      History of Presenting Illness:       Mr. Tee is a 46 y.o. gentleman with past medical history notable for primary hypertriglyceridemia, vocal cord dysfunction status post tracheostomy, and impaired fasting glucose who presents to the hospital early this morning with intermittent chest pain that had progressed to constant chest pain.  He describes having a constant chest tightness located over the center of his chest.  Slightly on the right side of his sternum.  The pain is nonradiating.  It is somewhat positional.  He was given sublingual nitroglycerin with minimal change in his symptoms although somewhat improved.  He was also given aspirin.  In the emergency room he was noted to have EKG which was more consistent with pericarditis and was started on ibuprofen and colchicine.  Since starting these medications he is starting to feel better this morning.  Fortunately his biomarkers have been within normal limits and he is getting an echocardiogram this morning which preliminary read by my view does not show any pericardial effusion or evidence of focal wall motion abnormality.  Fortunately his troponins have been negative x3.    General his symptoms have progressed over the last week or so.  They were initially mild in severity progressing to more moderate in severity but given that they were not going away he wanted to seek out further attention.  He denies any associated symptoms such as nausea or diaphoresis.  His symptoms  are not particularly exertional in nature.  He has never had any issues with chest pains before had any prior cardiac issues.    He denies any associated upper respiratory infections.  He did have a Covid booster a number of weeks ago but not within the last week.  Denies any fevers or chills.  Overall is doing reasonably well.    Review of Systems:  Review of Systems   Constitutional: Negative.   HENT: Negative.    Eyes: Negative.    Cardiovascular: Positive for chest pain.   Respiratory: Negative.    Endocrine: Negative.    Hematologic/Lymphatic: Negative.    Skin: Negative.    Musculoskeletal: Negative.    Gastrointestinal: Negative.    Genitourinary: Negative.    Neurological: Negative.    Psychiatric/Behavioral: Negative.    Allergic/Immunologic: Negative.        Medications:    Current Facility-Administered Medications:   •  acetaminophen (TYLENOL) tablet 650 mg, 650 mg, Oral, Q6H PRN, Calderon, Vanessa, APRN, 650 mg at 11/06/21 0054  •  colchicine tablet 0.6 mg, 0.6 mg, Oral, Q12H, Calderon, Vanessa, APRN, 0.6 mg at 11/05/21 2248  •  ibuprofen (ADVIL,MOTRIN) tablet 800 mg, 800 mg, Oral, TID, Calderon, Vanessa, APRN  •  lactated ringers infusion, 100 mL/hr, Intravenous, Continuous, Calderon, Vanessa, APRN, Last Rate: 100 mL/hr at 11/05/21 2248, 100 mL/hr at 11/05/21 2248  •  lithium (LITHOBID) CR tablet 600 mg, 600 mg, Oral, Nightly, Caldeorn, Vanessa, APRN, 600 mg at 11/05/21 2249  •  sodium chloride 0.9 % flush 10 mL, 10 mL, Intravenous, PRN, Tom Lazo MD  •  sodium chloride 0.9 % flush 10 mL, 10 mL, Intravenous, Q12H, Calderon, Vanessa, APRN  •  sodium chloride 0.9 % flush 10 mL, 10 mL, Intravenous, PRN, Calderon, Vanessa, APRN  •  sodium chloride 0.9 % flush 10 mL, 10 mL, Intravenous, Q12H, Calderon, Vanessa, APRN, 10 mL at 11/05/21 2249  •  sodium chloride 0.9 % flush 10 mL, 10 mL, Intravenous, PRN, Herth, Vanessa, APRN    Allergies   Allergen Reactions   • Sulfa Antibiotics    • Sulfamethoxazole-Trimethoprim        Past Medical History:    Diagnosis Date   • Abnormal serum thyroxine (T4) level    • Bipolar affective (HCC)    • Disorder of vocal cord     vocal cord dystonia   • Elevated liver enzymes    • Gout        Past Surgical History:   Procedure Laterality Date   • TRACHEOSTOMY         Social History     Socioeconomic History   • Marital status: Single   Tobacco Use   • Smoking status: Never Smoker   • Smokeless tobacco: Never Used   Substance and Sexual Activity   • Alcohol use: No     Comment: social   • Drug use: No   • Sexual activity: Defer       Family History   Problem Relation Age of Onset   • Diabetes Mother    • Kidney disease Mother    • Anxiety disorder Other    • Bipolar disorder Other        The following portions of the patient's history were reviewed and updated as appropriate: allergies, current medications, past family history, past medical history, past social history, past surgical history and problem list.         Objective:      Temp:  [98 °F (36.7 °C)-98.8 °F (37.1 °C)] 98 °F (36.7 °C)  Heart Rate:  [64-88] 67  Resp:  [16-18] 18  BP: (109-176)/() 139/97   No intake or output data in the 24 hours ending 11/06/21 0842  Body mass index is 30.79 kg/m².      11/05/21 1710   Weight: 103 kg (227 lb)       Body mass index is 30.79 kg/m².     Physical Exam:  Constitutional: Well appearing, well developed, no acute distress   HENT: Oropharynx clear and membrane moist  Eyes: Normal conjunctiva, no sclera icterus.  Neck: Supple, no carotid bruit bilaterally.  Cardiovascular: Regular rate and rhythm, No Murmur, No bilateral lower extremity edema.  Pulmonary: Normal respiratory effort, normal lung sounds, no wheezing.  Abdominal: Soft, nontender, no hepatosplenomegaly, liver is non-pulsatile.  Neurological: Alert and orient x 3.   Skin: Warm, dry, no ecchymosis, no rash.  Psych: Appropriate mood and affect. Normal judgment and insight.         Lab Review:   Results from last 7 days   Lab Units 11/06/21 0531 11/05/21 1714    SODIUM mmol/L 140 136   POTASSIUM mmol/L 3.7 3.4*   CHLORIDE mmol/L 105 100   CO2 mmol/L 22.4 28.8   BUN mg/dL 15 14   CREATININE mg/dL 0.92 0.92   GLUCOSE mg/dL 86 90   CALCIUM mg/dL 9.5 9.7   AST (SGOT) U/L  --  28   ALT (SGPT) U/L  --  51*     Results from last 7 days   Lab Units 11/05/21  2319 11/05/21  1911 11/05/21  1714   TROPONIN T ng/mL <0.010 <0.010 <0.010     Results from last 7 days   Lab Units 11/06/21  0531 11/05/21  1714   WBC 10*3/mm3 6.37 6.74   HEMOGLOBIN g/dL 13.7 14.4   HEMATOCRIT % 38.5 39.6   PLATELETS 10*3/mm3 234 265     Results from last 7 days   Lab Units 11/05/21  1714   INR  0.99     Results from last 7 days   Lab Units 11/06/21  0531   MAGNESIUM mg/dL 2.0           Invalid input(s): LDLCALC  The ASCVD Risk score (Chance CHRISTIANSON Jr., et al., 2013) failed to calculate for the following reasons:    Cannot find a previous HDL lab    Cannot find a previous total cholesterol lab     Results from last 7 days   Lab Units 11/05/21 1911   PROBNP pg/mL 34.5          CXR on 11/5/21  IMPRESSION:  No evidence for acute pulmonary process. Borderline heart  size. Follow-up as clinical indications persist    EKG on 11/6/21      EKG 2/28/18    I have reviewed his EKGs above which do show slight changes from prior EKG.  Some of his other EKGs in the emergency room demonstrated subtle ST elevation suggestive of pericarditis but have improved this morning.    Echocardiogram 11/6/2021 which is reviewed by myself with final report pending:  · Grossly normal left ventricular size and function  · No evidence of pericardial effusion       Assessment:           Chest pain         Plan:       Mr. Tee is a 46 y.o. gentleman with past medical history notable for primary hypertriglyceridemia, vocal cord dysfunction status post tracheostomy, and impaired fasting glucose who presents to the hospital yesterday with intermittent chest pain that had progressed to constant chest pain and found to have likely pericarditis.   His presentation is consistent with this diagnoses and fortunately does not have a complex case.  His echocardiogram is fairly bland and his EKGs are already improving.  His inflammatory biomarkers are also normal at this point.  He is already been started on ibuprofen and colchicine.  I would recommend continuing ibuprofen 600 mg 3 times daily for a week with daily colchicine followed by colchicine daily for a month.  Would like to see him back in our office in a couple of weeks to make sure that he is still doing well.  With regards to his overall risk ratification for his chest pain I have also added on a lipid panel to help further a stratify him for his overall cardiovascular risk and can follow-up with him regarding this as an outpatient.  I think he would be stable for hospital discharge this morning.      Chest pain:  · Likely pericarditis based upon EKG and symptoms  · Negative biomarkers both troponin and sed rate  · Echocardiogram without any high risk features  · Would continue anti-inflammatories scheduled with 600 mg 3 times daily for a week and colchicine daily for a month    Cardiovascular risk ratification:  · I have added on a lipid panel as this has not been done recently and can follow-up with his cardiovascular risk as an outpatient.      Thank you for allowing me to participate in the care of Wang Tee. Feel free to contact me directly with any further questions or concerns.    Johnny Franco MD  [facility]  11/06/21  08:42 EDT

## 2021-11-06 NOTE — PROGRESS NOTES
[facility]     Progress Note    Name: Wang Tee ADMIT: 2021   : 1975  PCP: Blanche Solares MD    MRN: [id number] LOS: 0 days   AGE/SEX: 46 y.o. male  ROOM: 110/1     Subjective   Subjective     Subjective   Patient reports that he has still had some chest tightness overnight and states he has a headache now.     Review of Systems       Objective   Objective     Objective   Vital Signs  Temp:  [98 °F (36.7 °C)-98.8 °F (37.1 °C)] 98 °F (36.7 °C)  Heart Rate:  [64-88] 67  Resp:  [16-18] 18  BP: (109-176)/() 139/97  SpO2:  [97 %-100 %] 97 %  on   ;   Device (Oxygen Therapy): room air  Body mass index is 30.79 kg/m².  Physical Exam  Vitals and nursing note reviewed.   Constitutional:       General: He is not in acute distress.     Appearance: Normal appearance. He is normal weight.   HENT:      Head: Normocephalic and atraumatic.      Nose: Nose normal.      Mouth/Throat:      Mouth: Mucous membranes are moist.   Eyes:      Extraocular Movements: Extraocular movements intact.   Cardiovascular:      Rate and Rhythm: Normal rate and regular rhythm.      Pulses: Normal pulses.      Heart sounds: Normal heart sounds.   Pulmonary:      Effort: Pulmonary effort is normal.      Breath sounds: Normal breath sounds.   Abdominal:      General: Abdomen is flat. Bowel sounds are normal.      Palpations: Abdomen is soft.   Musculoskeletal:         General: No swelling. Normal range of motion.      Cervical back: Normal range of motion and neck supple. No rigidity.   Skin:     General: Skin is warm and dry.   Neurological:      General: No focal deficit present.      Mental Status: He is alert and oriented to person, place, and time. Mental status is at baseline.   Psychiatric:         Mood and Affect: Mood normal.         Behavior: Behavior normal.         Thought Content: Thought content normal.         Judgment: Judgment normal.         Results Review     I reviewed the patient's new clinical  results.  Results from last 7 days   Lab Units 11/06/21  0531 11/05/21  1714   WBC 10*3/mm3 6.37 6.74   HEMOGLOBIN g/dL 13.7 14.4   PLATELETS 10*3/mm3 234 265     Results from last 7 days   Lab Units 11/06/21  0531 11/05/21  1714   SODIUM mmol/L 140 136   POTASSIUM mmol/L 3.7 3.4*   CHLORIDE mmol/L 105 100   CO2 mmol/L 22.4 28.8   BUN mg/dL 15 14   CREATININE mg/dL 0.92 0.92   GLUCOSE mg/dL 86 90   Estimated Creatinine Clearance: 125.9 mL/min (by C-G formula based on SCr of 0.92 mg/dL).  Results from last 7 days   Lab Units 11/05/21  1714   ALBUMIN g/dL 4.60   BILIRUBIN mg/dL 1.5*   ALK PHOS U/L 74   AST (SGOT) U/L 28   ALT (SGPT) U/L 51*     Results from last 7 days   Lab Units 11/06/21  0531 11/05/21  1714   CALCIUM mg/dL 9.5 9.7   ALBUMIN g/dL  --  4.60   MAGNESIUM mg/dL 2.0  --        COVID19   Date Value Ref Range Status   11/05/2021 Not Detected Not Detected - Ref. Range Final     No results found for: HGBA1C, POCGLU    XR Chest 1 View  Narrative: XR CHEST 1 VW-     HISTORY: Male who is 46 years-old,  chest pain     TECHNIQUE: Frontal view of the chest     COMPARISON: 10/31/2018     FINDINGS: Stable appearing tracheostomy tube. Heart the heart size is  borderline. Pulmonary vasculature is unremarkable. No focal pulmonary  consolidation, pleural effusion, or pneumothorax. No acute osseous  process.     Impression: No evidence for acute pulmonary process. Borderline heart  size. Follow-up as clinical indications persist.     This report was finalized on 11/5/2021 5:42 PM by Dr. Jerome Porras M.D.       Scheduled Medications  colchicine, 0.6 mg, Oral, Q12H  ibuprofen, 800 mg, Oral, TID  lithium, 600 mg, Oral, Nightly  sodium chloride, 10 mL, Intravenous, Q12H  sodium chloride, 10 mL, Intravenous, Q12H    Infusions  lactated ringers, 100 mL/hr, Last Rate: 100 mL/hr (11/06/21 0907)    Diet  NPO Diet NPO Except: Sips with meds         Assessment/Plan   Assessment / Plan       Active Hospital Problems     Diagnosis  POA   • Chest pain [R07.9]  Yes      Resolved Hospital Problems   No resolved problems to display.       46 y.o. male admitted with chest pain. Patient reported chest tightness progressively worsening in the past week. Workup done in ED was fairly unremarkable with negative troponin and EKG consistent with pericarditis. Cardiology was consulted and ordered echo. If this is normal, he can go home on Colchicine and Ibuprofen and will have follow up in 1-2 weeks in office.     · SCDs for DVT prophylaxis.  · Full code.  · Discussed with patient.  · Anticipate discharge today.    This progress note will additionally serve as discharge summary.     JUANITA Baker  [facility]  11/06/21  09:18 EDT

## 2021-11-06 NOTE — OUTREACH NOTE
Prep Survey      Responses   [facility] patient discharged from? [place]   Is LACE score < 7 ? Yes   Emergency Room discharge w/ pulse ox? No   Eligibility [facility]   Date of Admission 11/05/21   Date of Discharge 11/06/21   Discharge Disposition Home or Self Care   Discharge diagnosis Chest pain   Does the patient have one of the following disease processes/diagnoses(primary or secondary)? Other   Does the patient have Home health ordered? No   Is there a DME ordered? No   Prep survey completed? Yes          Ginger Goldstein RN

## 2021-11-08 ENCOUNTER — TRANSITIONAL CARE MANAGEMENT TELEPHONE ENCOUNTER (OUTPATIENT)
Dept: CALL CENTER | Facility: HOSPITAL | Age: 46
End: 2021-11-08

## 2021-11-08 NOTE — OUTREACH NOTE
Call Center TCM Note      Responses   [facility] patient discharged from? [place]   Does the patient have one of the following disease processes/diagnoses(primary or secondary)? Other   TCM attempt successful? Yes   Discharge diagnosis Chest pain   Is patient permission given to speak with other caregiver? Yes   Person spoke with today (if not patient) and relationship Mom Latricia Colunga reviewed with patient/caregiver? Yes   Is the patient having any side effects they believe may be caused by any medication additions or changes? No   Does the patient have all medications ordered at discharge? Yes   Is the patient taking all medications as directed (includes completed medication regime)? Yes   Does the patient have a primary care provider?  Yes   Does the patient have an appointment with their PCP within 7 days of discharge? No   Comments regarding PCP PCP Dr Solares has no appt avail within TCM time frame. I am asking ofc in routing msg to please review sched and call pt for TCM FWP to be completed by 11/20/2021. Pt waiting on call back from Cardio [facility] for fwp with them.    Has the patient kept scheduled appointments due by today? N/A   Has home health visited the patient within 72 hours of discharge? N/A   Psychosocial issues? No   Did the patient receive a copy of their discharge instructions? Yes   Nursing interventions Reviewed instructions with patient   What is the patient's perception of their health status since discharge? Improving   Is the patient/caregiver able to teach back signs and symptoms related to disease process for when to call PCP? Yes   Is the patient/caregiver able to teach back signs and symptoms related to disease process for when to call 911? Yes   Is the patient/caregiver able to teach back the hierarchy of who to call/visit for symptoms/problems? PCP, Specialist, Home health nurse, Urgent Care, ED, 911 Yes   If the patient is a current smoker, are they able to teach back  resources for cessation? Not a smoker   TCM call completed? Yes   Wrap up additional comments Pt sleeping, spoke with Mom. Pt doing better, chest pain not completely resolved but improving. Pt has rested well since coming home. New meds (Colchicine, Ibuprofen) in place. No questions at this time. PCP Dr Solares has no appt avail within TCM time frame. I am asking ofc in routing msg to please review sched and call pt for TCM FWP to be completed by 11/20/2021. Pt waiting on call back from [facility] for fwp with them.           Karol Terrell MA    11/8/2021, 15:03 EST

## 2021-11-24 ENCOUNTER — OFFICE VISIT (OUTPATIENT)
Dept: CARDIOLOGY | Facility: CLINIC | Age: 46
End: 2021-11-24

## 2021-11-24 VITALS
WEIGHT: 240.2 LBS | HEART RATE: 69 BPM | DIASTOLIC BLOOD PRESSURE: 78 MMHG | BODY MASS INDEX: 32.53 KG/M2 | HEIGHT: 72 IN | SYSTOLIC BLOOD PRESSURE: 128 MMHG

## 2021-11-24 DIAGNOSIS — R07.9 CHEST PAIN, UNSPECIFIED TYPE: ICD-10-CM

## 2021-11-24 DIAGNOSIS — I30.9 ACUTE PERICARDITIS, UNSPECIFIED TYPE: Primary | ICD-10-CM

## 2021-11-24 PROCEDURE — 93000 ELECTROCARDIOGRAM COMPLETE: CPT | Performed by: NURSE PRACTITIONER

## 2021-11-24 PROCEDURE — 99213 OFFICE O/P EST LOW 20 MIN: CPT | Performed by: NURSE PRACTITIONER

## 2021-11-24 RX ORDER — OMEGA-3S/DHA/EPA/FISH OIL/D3 300MG-1000
CAPSULE ORAL DAILY
COMMUNITY

## 2021-11-24 RX ORDER — ASCORBIC ACID 500 MG
500 TABLET ORAL DAILY
COMMUNITY

## 2021-11-24 NOTE — PROGRESS NOTES
Date of Office Visit: 2021  Encounter Provider: JUANITA Ruffin  Place of Service: [facility] CARDIOLOGY  Patient Name: Wang Tee  :1975    Chief Complaint   Patient presents with   • Chest Pain   :     HPI: Wang Tee is a 46 y.o. male who is a patient with no prior cardiac history.  On , he presented to the ED with chest pain.  Cardiac biomarkers were negative.  EKG findings appeared consistent with pericarditis and he was started on ibuprofen and colchicine.  Echocardiogram without high risk features.  He was seen in consultation by Dr. Franco who recommended continuing anti-inflammatories 3 times daily for 1 week and colchicine for 1 month.  He was advised to follow-up in our office in 1 week.   Overall his symptoms are improving.  He still has a little pressure in his chest as well as dyspnea on exertion.  He works for [employer] which he is able to do without difficulty.  He denies any edema, dizziness, or syncope.  He has occasional palpitations.  Evidently he had labs earlier this week at [facility].  I do not have access to them.    Past Medical History:   Diagnosis Date   • Abnormal serum thyroxine (T4) level    • Bipolar affective (HCC)    • Disorder of vocal cord     vocal cord dystonia   • Elevated liver enzymes    • Gout        Past Surgical History:   Procedure Laterality Date   • TRACHEOSTOMY         Social History     Socioeconomic History   • Marital status: Single   Tobacco Use   • Smoking status: Never Smoker   • Smokeless tobacco: Never Used   Substance and Sexual Activity   • Alcohol use: No     Comment: social   • Drug use: No   • Sexual activity: Defer       Family History   Problem Relation Age of Onset   • Diabetes Mother    • Kidney disease Mother    • Anxiety disorder Other    • Bipolar disorder Other        Review of Systems   Constitutional: Negative.   Cardiovascular: Positive for chest pain (pressure), dyspnea on exertion and  "palpitations. Negative for leg swelling, orthopnea, paroxysmal nocturnal dyspnea and syncope.   Respiratory: Negative.    Hematologic/Lymphatic: Negative for bleeding problem.   Musculoskeletal: Negative for falls.   Gastrointestinal: Negative for melena.   Neurological: Negative for dizziness and light-headedness.       Allergies   Allergen Reactions   • Sulfa Antibiotics    • Sulfamethoxazole-Trimethoprim          Current Outpatient Medications:   •  ascorbic acid (VITAMIN C) 500 MG tablet, Take 500 mg by mouth Daily., Disp: , Rfl:   •  cholecalciferol (VITAMIN D3) 10 MCG (400 UNIT) tablet, Take  by mouth Daily., Disp: , Rfl:   •  co-enzyme Q-10 30 MG capsule, Take  by mouth Daily., Disp: , Rfl:   •  colchicine 0.6 MG tablet, Take 1 tablet by mouth Daily for 30 days., Disp: 30 tablet, Rfl: 0  •  lithium (ESKALITH) 450 MG CR tablet, Take  by mouth 2 (two) times a day., Disp: , Rfl:   •  lithium 600 MG capsule, TK ONE C PO BID, Disp: , Rfl: 0      Objective:     Vitals:    11/24/21 0959   BP: 128/78   Pulse: 69   Weight: 109 kg (240 lb 3.2 oz)   Height: 182.9 cm (72\")     Body mass index is 32.58 kg/m².    PHYSICAL EXAM:    Neck:      Vascular: No JVD.   Pulmonary:      Effort: Pulmonary effort is normal.      Breath sounds: Normal breath sounds.   Cardiovascular:      Normal rate. Regular rhythm.      Murmurs: There is no murmur.      No gallop. No click. No rub.   Pulses:     Intact distal pulses.           ECG 12 Lead    Date/Time: 11/24/2021 10:14 AM  Performed by: Emily Ridley APRN  Authorized by: Emily Ridley APRN   Comparison: compared with previous ECG from 11/6/2021  Similar to previous ECG  Rhythm: sinus rhythm  Rate: normal  BPM: 69  Q waves: III    T inversion: III    Clinical impression: normal ECG  Comments: Indication: Pericarditis              Assessment:       Diagnosis Plan   1. Acute pericarditis, unspecified type  ECG 12 Lead   2. Chest pain, unspecified type       Orders Placed " This Encounter   Procedures   • ECG 12 Lead     This order was created via procedure documentation     Order Specific Question:   Release to patient     Answer:   Immediate          Plan:       I think overall he is doing better.  His symptoms seem to be improving.  His EKG is stable.  He will continue colchicine and follow-up with Dr. Franco in 1 month.      As always, it has been a pleasure to participate in your patient's care.      Sincerely,         JUANITA Sandy

## 2022-01-13 ENCOUNTER — OFFICE VISIT (OUTPATIENT)
Dept: CARDIOLOGY | Facility: CLINIC | Age: 47
End: 2022-01-13

## 2022-01-13 VITALS
DIASTOLIC BLOOD PRESSURE: 78 MMHG | SYSTOLIC BLOOD PRESSURE: 132 MMHG | WEIGHT: 237.6 LBS | BODY MASS INDEX: 32.18 KG/M2 | HEIGHT: 72 IN | HEART RATE: 65 BPM

## 2022-01-13 DIAGNOSIS — I30.9 ACUTE PERICARDITIS, UNSPECIFIED TYPE: Primary | ICD-10-CM

## 2022-01-13 PROCEDURE — 99213 OFFICE O/P EST LOW 20 MIN: CPT | Performed by: INTERNAL MEDICINE

## 2022-01-13 PROCEDURE — 93000 ELECTROCARDIOGRAM COMPLETE: CPT | Performed by: INTERNAL MEDICINE

## 2022-01-13 NOTE — PROGRESS NOTES
[facility] Follow Up Office Note     Encounter Date:22  Patient:Wang Tee  :1975  MRN:[id number]      Chief Complaint:   Chief Complaint   Patient presents with   • Acute pericarditis     1 month f/u     History of Presenting Illness:      Mr. Tee is a 46 y.o. gentleman with past medical history notable for primary hypertriglyceridemia, vocal cord dysfunction status post tracheostomy, and impaired fasting glucose who recently had an episode of chest pain and acute pericarditis back in 2021. He was seen by myself in the emergency room at that time and initiated on anti-inflammatory medications. He was seen in follow-up and slowly improving with regards to his chest pain.  Since then patient has continued to do well.  He now no longer has any chest pain.  Overall he is doing well and recovering nicely.      Review of Systems:  Review of Systems   Constitutional: Positive for malaise/fatigue.   HENT: Negative.    Eyes: Negative.    Cardiovascular: Negative.  Negative for chest pain.   Respiratory: Positive for shortness of breath.    Endocrine: Negative.    Hematologic/Lymphatic: Negative.    Skin: Negative.    Musculoskeletal: Negative.    Gastrointestinal: Negative.    Genitourinary: Negative.    Neurological: Negative.    Psychiatric/Behavioral: Negative.    Allergic/Immunologic: Negative.        Current Outpatient Medications on File Prior to Visit   Medication Sig Dispense Refill   • ascorbic acid (VITAMIN C) 500 MG tablet Take 500 mg by mouth Daily.     • cholecalciferol (VITAMIN D3) 10 MCG (400 UNIT) tablet Take  by mouth Daily.     • co-enzyme Q-10 30 MG capsule Take  by mouth Daily.     • lithium (ESKALITH) 450 MG CR tablet Take 450 mg by mouth Daily. 450 am     • lithium 600 MG capsule Take 600 mg by mouth Daily. 600 PM  0     No current facility-administered medications on file prior to visit.       Allergies   Allergen Reactions   • Sulfa Antibiotics    •  "Sulfamethoxazole-Trimethoprim        Past Medical History:   Diagnosis Date   • Abnormal serum thyroxine (T4) level    • Bipolar affective (HCC)    • Disorder of vocal cord     vocal cord dystonia   • Elevated liver enzymes    • Gout        Past Surgical History:   Procedure Laterality Date   • TRACHEOSTOMY         Social History     Socioeconomic History   • Marital status: Single   Tobacco Use   • Smoking status: Never Smoker   • Smokeless tobacco: Never Used   Substance and Sexual Activity   • Alcohol use: No     Comment: social   • Drug use: No   • Sexual activity: Defer       Family History   Problem Relation Age of Onset   • Diabetes Mother    • Kidney disease Mother    • Anxiety disorder Other    • Bipolar disorder Other        The following portions of the patient's history were reviewed and updated as appropriate: allergies, current medications, past family history, past medical history, past social history, past surgical history and problem list.       Objective:       Vitals:    01/13/22 1016   BP: 132/78   BP Location: Right arm   Patient Position: Sitting   Pulse: 65   Weight: 108 kg (237 lb 9.6 oz)   Height: 182.9 cm (72\")         Physical Exam:  Constitutional: Well appearing, well developed, no acute distress   HENT: Oropharynx clear and membrane moist  Eyes: Normal conjunctiva, no sclera icterus.  Neck: Supple, no carotid bruit bilaterally.  Tracheostomy in place  Cardiovascular: Regular rate and rhythm, No Murmur, No bilateral lower extremity edema.  Pulmonary: Normal respiratory effort, normal lung sounds, no wheezing.  Abdominal: Soft, nontender, no hepatosplenomegaly, liver is non-pulsatile.  Neurological: Alert and orient x 3.   Skin: Warm, dry, no ecchymosis, no rash.  Psych: Appropriate mood and affect. Normal judgment and insight.         Lab Results   Component Value Date     11/06/2021     11/05/2021    K 3.7 11/06/2021    K 3.4 (L) 11/05/2021     11/06/2021     " 11/05/2021    CO2 22.4 11/06/2021    CO2 28.8 11/05/2021    BUN 15 11/06/2021    BUN 14 11/05/2021    CREATININE 0.92 11/06/2021    CREATININE 0.92 11/05/2021    EGFRIFNONA 89 11/06/2021    EGFRIFNONA 89 11/05/2021    EGFRIFAFRI 120 07/25/2017    EGFRIFAFRI 138 08/18/2016    GLUCOSE 86 11/06/2021    GLUCOSE 90 11/05/2021    CALCIUM 9.5 11/06/2021    CALCIUM 9.7 11/05/2021    PROTENTOTREF 7.1 08/18/2016    PROTENTOTREF 7.0 02/23/2016    ALBUMIN 4.60 11/05/2021    ALBUMIN 4.50 10/31/2018    BILITOT 1.5 (H) 11/05/2021    BILITOT 1.7 (H) 10/31/2018    AST 28 11/05/2021     (H) 10/31/2018    ALT 51 (H) 11/05/2021     (H) 10/31/2018     Lab Results   Component Value Date    WBC 6.37 11/06/2021    WBC 6.74 11/05/2021    HGB 13.7 11/06/2021    HGB 14.4 11/05/2021    HCT 38.5 11/06/2021    HCT 39.6 11/05/2021    MCV 83.3 11/06/2021    MCV 83.0 11/05/2021     11/06/2021     11/05/2021     Lab Results   Component Value Date    CHOL 145 11/06/2021    TRIG 126 11/06/2021    TRIG 184 (H) 08/18/2016    HDL 38 (L) 11/06/2021    HDL 39 (L) 08/18/2016    LDL 84 11/06/2021    LDL 97 08/18/2016     The 10-year ASCVD risk score (Chance CHRISTIANSON Jr., et al., 2013) is: 1.7%    Values used to calculate the score:      Age: 46 years      Sex: Male      Is Non- : No      Diabetic: No      Tobacco smoker: No      Systolic Blood Pressure: 132 mmHg      Is BP treated: No      HDL Cholesterol: 38 mg/dL      Total Cholesterol: 145 mg/dL       Lab Results   Component Value Date    PROBNP 34.5 11/05/2021    PROBNP 14.4 02/26/2018     Lab Results   Component Value Date    TROPONINT <0.010 11/05/2021     Lab Results   Component Value Date    TSH 1.23 06/30/2015    TSH 0.27 05/06/2015     Lab Results   Component Value Date    SEDRATE 1 11/05/2021         ECG 12 Lead    Date/Time: 1/13/2022 10:39 AM  Performed by: Johnny Franco MD  Authorized by: Johnny Franco MD   Comparison: compared with previous  ECG from 11/24/2021  Similar to previous ECG  Rhythm: sinus rhythm  Other findings: early repolarization        Echocardiogram 11/5/2021 with images reviewed by myself:  · Left ventricular ejection fraction appears to be 61 - 65%. Left ventricular systolic function is normal. Left ventricular wall thickness is consistent with mild to moderate concentric hypertrophy. Left ventricular diastolic function was indeterminate.  · The right ventricular cavity is mildly dilated. Normal right ventricular systolic function noted.  · Interatrial septal aneurysm present. Saline test results are negative  · The ascending aorta is mildly dilated at 3.8 cm  · There is no evidence of pericardial effusion           Assessment:          Diagnosis Plan   1. Acute pericarditis, unspecified type  ECG 12 Lead        Plan:       Mr. Tee is a 46 y.o. gentleman with past medical history notable for primary hypertriglyceridemia, vocal cord dysfunction status post tracheostomy, and impaired fasting glucose who recently had an episode of chest pain and acute pericarditis back in November 2021. Fortunately his acute pericarditis presentation was fairly low risk with normal biomarkers and echocardiogram demonstrating no significant pericardial effusion.  Fortunately symptoms have resolved.  No further cardiac testing or work-up needed.  He can see us back on an as-needed basis for any recurrent issues with chest pain or pericarditis but hopefully will not be an issue given his low risk presentation this last time around.    Pericarditis:  · Normal biomarkers 11/2021  · Echocardiogram without pericardial effusion 11/2021  · Symptoms have resolved with nonsteroidal anti-inflammatories and no further work-up needed at this time      Follow up:  As needed    Johnny Franco MD  [facility]  01/13/22  10:39 EST

## 2022-10-31 ENCOUNTER — HOSPITAL ENCOUNTER (OUTPATIENT)
Dept: GENERAL RADIOLOGY | Facility: HOSPITAL | Age: 47
Discharge: HOME | End: 2022-10-31
Admitting: NURSE PRACTITIONER

## 2022-10-31 ENCOUNTER — OFFICE VISIT (OUTPATIENT)
Dept: INTERNAL MEDICINE | Facility: CLINIC | Age: 47
End: 2022-10-31

## 2022-10-31 VITALS
HEART RATE: 80 BPM | HEIGHT: 72 IN | DIASTOLIC BLOOD PRESSURE: 102 MMHG | WEIGHT: 260.4 LBS | SYSTOLIC BLOOD PRESSURE: 144 MMHG | TEMPERATURE: 98 F | BODY MASS INDEX: 35.27 KG/M2 | OXYGEN SATURATION: 98 %

## 2022-10-31 DIAGNOSIS — R03.0 ELEVATED BP WITHOUT DIAGNOSIS OF HYPERTENSION: ICD-10-CM

## 2022-10-31 DIAGNOSIS — M25.562 ACUTE PAIN OF LEFT KNEE: Primary | ICD-10-CM

## 2022-10-31 DIAGNOSIS — M25.562 ACUTE PAIN OF LEFT KNEE: ICD-10-CM

## 2022-10-31 PROCEDURE — 73560 X-RAY EXAM OF KNEE 1 OR 2: CPT

## 2022-10-31 PROCEDURE — 99214 OFFICE O/P EST MOD 30 MIN: CPT | Performed by: NURSE PRACTITIONER

## 2022-10-31 RX ORDER — PREDNISONE 10 MG/1
TABLET ORAL
Qty: 21 TABLET | Refills: 0 | Status: SHIPPED | OUTPATIENT
Start: 2022-10-31 | End: 2022-11-10

## 2022-10-31 RX ORDER — PHENOL 1.4 %
10 AEROSOL, SPRAY (ML) MUCOUS MEMBRANE NIGHTLY PRN
COMMUNITY
Start: 2022-02-25

## 2022-10-31 RX ORDER — MULTIPLE VITAMINS W/ MINERALS TAB 9MG-400MCG
1 TAB ORAL DAILY
COMMUNITY

## 2022-10-31 RX ORDER — TRIAMCINOLONE ACETONIDE 1 MG/G
1 CREAM TOPICAL 2 TIMES DAILY
COMMUNITY
Start: 2022-10-20

## 2022-10-31 RX ORDER — CLONAZEPAM 0.5 MG/1
0.5 TABLET ORAL NIGHTLY PRN
COMMUNITY
Start: 2022-10-05

## 2022-10-31 NOTE — PROGRESS NOTES
Subjective   Wang Tee is a 47 y.o. male. Patient is here today for   Chief Complaint   Patient presents with   • Knee Pain     Left knee -- Ongoing for past week   • Establish Care     New patient   • Fatigue   .    History of Present Illness   New patient here to establish care.  Health history and questionnaire have been reviewed in its entirety. The patient's previous primary care provider was Dr. Solares. He was last seen in 2018    C/o left knee pain x 1 week associated with swelling. He tried ibuprofen and indomethacin and elevated leg with minimal relief. No specific injury that he is aware of.   He has had gout in his ankles in the past, but not knee.     Patient's BP is elevated today. He was on amlodipine in the past and he had a side effect to it, so he stopped it. He has had some headaches.     The following portions of the patient's history were reviewed and updated as appropriate: allergies, current medications, past family history, past medical history, past social history, past surgical history and problem list.    Review of Systems    Objective   Vitals:    10/31/22 1457   BP: (!) 144/102   Pulse: 80   Temp: 98 °F (36.7 °C)   SpO2: 98%     Body mass index is 35.31 kg/m².  Physical Exam  Vitals and nursing note reviewed.   Constitutional:       Appearance: Normal appearance. He is well-developed.   Cardiovascular:      Rate and Rhythm: Normal rate and regular rhythm.      Heart sounds: Normal heart sounds.   Pulmonary:      Effort: Pulmonary effort is normal.      Breath sounds: Normal breath sounds.   Musculoskeletal:      Left knee: Swelling present. Tenderness present.   Skin:     General: Skin is warm and dry.   Neurological:      Mental Status: He is alert and oriented to person, place, and time.   Psychiatric:         Speech: Speech normal.         Behavior: Behavior normal.         Thought Content: Thought content normal.         Assessment & Plan   Diagnoses and all orders for this  visit:    1. Acute pain of left knee (Primary)  -     XR Knee 1 or 2 View Left; Future  -     Ambulatory Referral to Sports Medicine  -     predniSONE (DELTASONE) 10 MG tablet; Take 60 mg po day 1, 50 mg day 2, 40 mg day 3, 30 mg day 4, 20 mg day 5, 10 mg day 6  Dispense: 21 tablet; Refill: 0    2. Elevated BP without diagnosis of hypertension      Knee pain -we will get an x-ray and refer to sports medicine.  Will treat with prednisone.  Patient was requesting a note for work since he missed work last week due to his knee pain.  He can have a note for work today since today is the first day I am seeing him.    HTN -instructed patient to return in about 4 weeks to recheck blood pressure.  He is also due for a physical and fasting lab work

## 2022-11-03 ENCOUNTER — OFFICE VISIT (OUTPATIENT)
Dept: SPORTS MEDICINE | Facility: CLINIC | Age: 47
End: 2022-11-03

## 2022-11-03 VITALS
SYSTOLIC BLOOD PRESSURE: 175 MMHG | WEIGHT: 264 LBS | HEART RATE: 72 BPM | OXYGEN SATURATION: 96 % | TEMPERATURE: 98.7 F | HEIGHT: 72 IN | BODY MASS INDEX: 35.76 KG/M2 | DIASTOLIC BLOOD PRESSURE: 115 MMHG

## 2022-11-03 DIAGNOSIS — M25.462 EFFUSION OF LEFT KNEE: ICD-10-CM

## 2022-11-03 DIAGNOSIS — M17.12 PRIMARY OSTEOARTHRITIS OF LEFT KNEE: Primary | ICD-10-CM

## 2022-11-03 PROCEDURE — 20611 DRAIN/INJ JOINT/BURSA W/US: CPT | Performed by: STUDENT IN AN ORGANIZED HEALTH CARE EDUCATION/TRAINING PROGRAM

## 2022-11-03 PROCEDURE — 99204 OFFICE O/P NEW MOD 45 MIN: CPT | Performed by: STUDENT IN AN ORGANIZED HEALTH CARE EDUCATION/TRAINING PROGRAM

## 2022-11-03 RX ORDER — TRIAMCINOLONE ACETONIDE 40 MG/ML
40 INJECTION, SUSPENSION INTRA-ARTICULAR; INTRAMUSCULAR ONCE
Status: COMPLETED | OUTPATIENT
Start: 2022-11-03 | End: 2022-11-03

## 2022-11-03 RX ORDER — LIDOCAINE HYDROCHLORIDE 10 MG/ML
1 INJECTION, SOLUTION EPIDURAL; INFILTRATION; INTRACAUDAL; PERINEURAL ONCE
Status: COMPLETED | OUTPATIENT
Start: 2022-11-03 | End: 2022-11-03

## 2022-11-03 RX ORDER — LIDOCAINE HYDROCHLORIDE 10 MG/ML
2 INJECTION, SOLUTION EPIDURAL; INFILTRATION; INTRACAUDAL; PERINEURAL ONCE
Status: COMPLETED | OUTPATIENT
Start: 2022-11-03 | End: 2022-11-03

## 2022-11-03 RX ADMIN — TRIAMCINOLONE ACETONIDE 40 MG: 40 INJECTION, SUSPENSION INTRA-ARTICULAR; INTRAMUSCULAR at 10:51

## 2022-11-03 RX ADMIN — LIDOCAINE HYDROCHLORIDE 2 ML: 10 INJECTION, SOLUTION EPIDURAL; INFILTRATION; INTRACAUDAL; PERINEURAL at 10:52

## 2022-11-03 RX ADMIN — LIDOCAINE HYDROCHLORIDE 1 ML: 10 INJECTION, SOLUTION EPIDURAL; INFILTRATION; INTRACAUDAL; PERINEURAL at 11:30

## 2022-11-03 NOTE — PROGRESS NOTES
Wang is a 47 y.o. year old male here today for consultation requested by JUANITA Vicente (PCP)    Chief Complaint   Patient presents with   • Left Knee - Initial Evaluation       History of Present Illness  Wang is a 47 y.o. year old male here today accompanied by his dad for left knee pain. Pain has been present for the past week with no known injury or trauma. Pain is currently rated 2/10 and described as a throbbing and aching pain. At times it is so painful that he can't even bear weight. Pain is located on the superior aspect, but has also been having pain posteriorly.  Admits to associated swelling, and stiffness with flexion.  Denies any numbness or tingling, erythema, giving way, or popping.  Pain worsens with activity, including standing, walking, or stairs. He was seen by his PCP on 10/31/2022, x-rays were obtained, and he was given a prescription for a prednisone taper. Feels like the prednisone has helped some. He was initially managing symptoms with ibuprofen or Tylenol and rest. Has been unable to work due to the pain and swelling. 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 He denies any previous injury or occurrence.    The following data was reviewed by: Renu Encinas DO on 11/03/2022:  Data reviewed: PCP note from 10/31/2022     Left Knee X-Ray from 10/31/2022  Images personally reviewed and interpreted  Indication: Pain  Views: AP and Lateral  Findings:  No fracture  No bony lesion  Normal soft tissues, mild effusion  Medial and patellofemoral narrowing; bone hypertrophy of superior pole and anterior aspect of the patella and at the fibular head  No prior studies were available for comparison.      Review of Systems   Constitutional: Positive for fatigue.   Respiratory: Positive for apnea.    Neurological: Positive for dizziness, light-headedness and headaches.   Psychiatric/Behavioral: Positive for agitation.   All other systems reviewed and are  "negative.      BP (!) 175/115 (BP Location: Left arm, Patient Position: Sitting, Cuff Size: Large Adult)   Pulse 72   Temp 98.7 °F (37.1 °C) (Temporal)   Ht 182.9 cm (72\")   Wt 120 kg (264 lb)   SpO2 96%   BMI 35.80 kg/m²    He recently established with a PCP who is monitoring his elevated blood pressure. He has a follow-up scheduled on 12/1/2022      Physical Exam  Vital signs reviewed.   General: Well developed, well nourished; No acute distress.  Eyes: conjunctiva clear; pupils equally round and reactive  ENT: external ears and nose atraumatic; hearing normal; tracheostomy  CV: no peripheral edema, 2+ distal pulses  Resp: normal respiratory effort, no use of accessory muscles  Skin: normal color and pigmentation; no rashes or wounds; normal turgor  Psych: alert and oriented; mood and affect appropriate; recent and remote memory intact  Neuro: sensation to light touch intact    MSK Exam:  The left knee is without obvious signs of acute bony deformity, quadriceps atrophy, erythema, or ecchymosis. There is a diffuse rash of the bilateral lower legs, but no open wounds or signs of acute infection. There is a moderate effusion. The patella is mildly tender. Apprehension is negative with medial and lateral glide. Patella crepitus is positive. The medial joint line is tender without bony crepitus or step-off. Soft tissue including the distal hamstring tendons, pes anserine, quad tendon, patellar tendon, proximal gastroc tendon, distal IT band, and Gerdy's tubercle are nontender. Flexion is limited roughly 10 degrees compared to the right and painful. Extension is full and symmetrical. Knee and hip strength is 4/5. Varus & valgus stress, Lachman's, anterior drawer, and posterior drawer are all negative. Anterior knee pain with Rashaun's. The opposite knee is nontender and stable. Gait is mildly antalgic.    Ultrasound guided Left Knee Aspiration/Injection Procedure Note    Left knee aspiration/injection was " "discussed with the patient in detail, including indication, risks, benefits, and alternatives. Verbal consent was given for the procedure. Injection was performed by physician. Ultrasound guidance was used for accuracy.   Aspiration/injection site at the superior lateral capsule recess was identified by physical examination then cleaned with Betadine and alcohol swabs.  Sterile technique was used. Local anesthesia was obtained with approximately 1 mL of 1% lidocaine without epinephrine.   An 18-gauge, 1.5\" needle was used to aspirate approximately 10 mL of clear, yellow fluid.   The needle was left in place and syringe was changed for injection. Injectate was passed into the joint space without difficulty. The needle was removed and a simple bandage was applied. The procedure was tolerated well without difficulty.    Injection mixture:  1% lidocaine without epinephrine: 2 mL  40 mg/mL triamcinolone acetonide: 1 mL      Assessment and Plan  Diagnoses and all orders for this visit:    1. Primary osteoarthritis of left knee (Primary)  -     Ambulatory Referral to Physical Therapy Evaluate and treat; Stretching, ROM, Strengthening  -     triamcinolone acetonide (KENALOG-40) injection 40 mg  -     lidocaine PF 1% (XYLOCAINE) injection 2 mL  -     lidocaine PF 1% (XYLOCAINE) injection 1 mL    2. Effusion of left knee  -     Ambulatory Referral to Physical Therapy Evaluate and treat; Stretching, ROM, Strengthening  -     triamcinolone acetonide (KENALOG-40) injection 40 mg  -     lidocaine PF 1% (XYLOCAINE) injection 2 mL  -     lidocaine PF 1% (XYLOCAINE) injection 1 mL    Patient is a 47-year-old male here today with acute left knee pain that began 1 week ago with no known injury or trauma.  We reviewed images and exam findings which are consistent with osteoarthritis.  We reviewed surgical and conservative treatment options.  The decision was made to proceed with a joint aspiration and injection. The procedure was well " tolerated. He has been educated on the signs and symptoms of local reaction and infection and should call the office if he experiences any of these. He should take it easy for the next 24 to 48 hours. After that, he may return to normal activity. It was recommended to continue low impact activity. He may also ice and take oral anti-inflammatory medications or Tylenol as needed for pain.  May also try a knee compression sleeve for swelling and comfort.  An order was provided for physical therapy lower extremity and core strength.  A work note was provided allowing him to work light duty, limiting his standing and walking as allowed.  We will follow-up in 6 weeks. All of his questions were answered and he is agreeable with the plan.    Dictated utilizing Dragon dictation.

## 2022-11-09 ENCOUNTER — TELEPHONE (OUTPATIENT)
Dept: INTERNAL MEDICINE | Facility: CLINIC | Age: 47
End: 2022-11-09

## 2022-11-09 NOTE — TELEPHONE ENCOUNTER
Caller: Wang Tee     Relationship: [unfilled]     Best call back number: [phone]     What is your medical concern? PATIENT CALLING STATING THAT HE IS HAVING HEADACHES THAT SEEM TO BE GETTING MORE FREQUENT HE STATED WHEN HE TAKES TYLENOL IT DOESN'T HELP HE WOULD LIKE TO KNOW IF THIS COULD BE DUE TO HIS BLOOD PRESSURE IF SO COULD HE BE PRESCRIBED BLOOD PRESSURE MEDICATION.,    How long has this issue been going on? A LITTLE OVER A WEEK    Is your provider already aware of this issue? YES    Have you been treated for this issue? YES

## 2022-11-10 ENCOUNTER — TREATMENT (OUTPATIENT)
Dept: PHYSICAL THERAPY | Facility: CLINIC | Age: 47
End: 2022-11-10

## 2022-11-10 ENCOUNTER — OFFICE VISIT (OUTPATIENT)
Dept: INTERNAL MEDICINE | Facility: CLINIC | Age: 47
End: 2022-11-10

## 2022-11-10 VITALS
TEMPERATURE: 98.4 F | HEART RATE: 90 BPM | OXYGEN SATURATION: 99 % | WEIGHT: 256.5 LBS | SYSTOLIC BLOOD PRESSURE: 152 MMHG | DIASTOLIC BLOOD PRESSURE: 94 MMHG | BODY MASS INDEX: 34.74 KG/M2 | HEIGHT: 72 IN

## 2022-11-10 DIAGNOSIS — M17.12 PRIMARY OSTEOARTHRITIS OF LEFT KNEE: Primary | ICD-10-CM

## 2022-11-10 DIAGNOSIS — M25.462 EFFUSION OF LEFT KNEE: ICD-10-CM

## 2022-11-10 DIAGNOSIS — I10 PRIMARY HYPERTENSION: Primary | ICD-10-CM

## 2022-11-10 PROCEDURE — 99214 OFFICE O/P EST MOD 30 MIN: CPT | Performed by: NURSE PRACTITIONER

## 2022-11-10 PROCEDURE — 97530 THERAPEUTIC ACTIVITIES: CPT | Performed by: PHYSICAL THERAPIST

## 2022-11-10 PROCEDURE — 97161 PT EVAL LOW COMPLEX 20 MIN: CPT | Performed by: PHYSICAL THERAPIST

## 2022-11-10 PROCEDURE — 97110 THERAPEUTIC EXERCISES: CPT | Performed by: PHYSICAL THERAPIST

## 2022-11-10 RX ORDER — LOSARTAN POTASSIUM 50 MG/1
50 TABLET ORAL DAILY
Qty: 30 TABLET | Refills: 1 | Status: SHIPPED | OUTPATIENT
Start: 2022-11-10 | End: 2023-01-12

## 2022-11-10 NOTE — PROGRESS NOTES
Physical Therapy Initial Evaluation and Plan of Care      Patient: Wang Tee   : 1975  Diagnosis/ICD-10 Code:  Primary osteoarthritis of left knee [M17.12]  Referring practitioner: Renu Encinas DO  Date of Initial Visit: 11/10/2022  Today's Date: 11/10/2022  Patient seen for 1 sessions           Subjective Questionnaire: LEFS: 50/80 or 38% disability      Subjective Evaluation    History of Present Illness  Mechanism of injury: His left knee started giving him trouble about 2 weeks.  His left knee started swelling, he couldn't bend the knee, struggled to walk. He elevated the left knee with ice but this didn't help.  He missed one week of work. Dr. Dr. Encinas aspirated the left knee and injected the knee.  He is now able to bend the knee and walk with greater ease. He is able to work now but has to be careful with activities.     Sleep is not a problem. Has had trach for 4 yeasrs.       Patient Occupation: Works at the [employer]  Current pain ratin  At best pain ratin  At worst pain ratin  Location: left knee  Quality: tight  Relieving factors: medications, change in position, rest and ice (Lying down, sitting with legs out with knee in slight flexion.)  Aggravating factors: stairs and squatting (kneeling)  Progression: improved    Social Support  Lives in: apartment (3rd floor with stairs)  Lives with: significant other    Hand dominance: right    Diagnostic Tests  X-ray: abnormal    Treatments  Current treatment: injection treatment and medication  Current treatment comments: Tylenol.     Patient Goals  Patient goals for therapy: decreased pain, increased motion, increased strength and independence with ADLs/IADLs  Patient goal: He wants to get his left knee to feel like his right knee.       Objective          Static Posture     Knee   Genu varus.     Ankle/Foot   Ankle/Foot (Left): Pes planus and pronated.   Ankle/Foot (Right): Pes planus and pronated.     Active Range of  Motion   Left Knee   Flexion: 110 degrees   Extension: 0 degrees     Right Knee   Flexion: 130 degrees   Extension: 0 degrees     Strength/Myotome Testing     Left Knee   Flexion: 4  Extension: 4  Quadriceps contraction: fair    Right Knee   Normal strength    Tests     Left Knee   Negative medial Rashaun, valgus stress test at 0 degrees, valgus stress test at 30 degrees, varus stress test at 0 degrees and varus stress test at 30 degrees.     Swelling     Left Knee Girth Measurement (cm)   Joint line: 42 cm    Right Knee Girth Measurement (cm)   Joint line: 41 cm          Assessment & Plan     Assessment  Impairments: abnormal gait, abnormal or restricted ROM, activity intolerance, impaired physical strength, lacks appropriate home exercise program, pain with function and weight-bearing intolerance  Functional Limitations: walking, uncomfortable because of pain, standing and unable to perform repetitive tasks  Assessment details: Wang Tee is a 47 y.o. year-old male referred to physical therapy for left knee OA and effusion. He presents with a stable clinical presentation.  He has comorbidities tracheostoma, obesity, bipolar affective disorder but no personal factors that may affect his progress in the plan of care.  Signs and symptoms are consistent with  diagnosis of left knee OA.   Prognosis: good    Goals  Plan Goals:  STG (4 weeks)  1.  Pt will be I with HEP for improved left knee AROM.  2.  Pt will report decreased left knee pain at worst from 9/10 to 4/10.  3.  Pt will be able to use reciprocal gait on stairs.    LTG (6 weeks)  1.  Pt will be I with left LE strengthening /stabilization program for return to previous activity level.    2.  Pt will demonstrate improved left knee ROM 0 to 130 degrees for ease with all functional tasks.  3.  Pt will demonstrate 5/5 left knee strength for stability with all weight bearing tasks.  4. Pt will report improved perceived function via LEFS from 38% to 20%  disability.    Plan  Therapy options: will be seen for skilled therapy services  Planned modality interventions: cryotherapy, electrical stimulation/Lithuanian stimulation and ultrasound  Planned therapy interventions: flexibility, functional ROM exercises, gait training, home exercise program, manual therapy, neuromuscular re-education, soft tissue mobilization, strengthening, stretching and therapeutic activities  Frequency: 1x week  Duration in weeks: 6  Treatment plan discussed with: patient        Manual Therapy:    0     mins  63346;  Therapeutic Exercise:    15     mins  [id number];     Neuromuscular Allison:    0    mins  [id number];    Therapeutic Activity:     10     mins  [id number];     Gait Trainin     mins  [id number];     Ultrasound:     0     mins  [id number];    Electrical Stimulation:    0     mins  35039 ( );  Dry Needling     0     mins self-pay    Timed Treatment:   25   mins   Total Treatment:     40   mins    PT SIGNATURE: Laney Chou, PT   PT LICENSE: [id number]  DATE TREATMENT INITIATED: 11/10/2022    Initial Certification  Certification Period: 2023  I certify that the therapy services are furnished while this patient is under my care.  The services outlined above are required by this patient, and will be reviewed every 90 days.     PHYSICIAN: Renu Encinas,       DATE:     Please sign and return via fax to [phone].. Thank you, [facility].

## 2022-11-10 NOTE — PROGRESS NOTES
Subjective   Wang Tee is a 47 y.o. male. Patient is here today for   Chief Complaint   Patient presents with   • Headache   • Hypertension   • Abdominal Pain   .    History of Present Illness   Patient is here to discuss elevated blood pressure. He has had a headache associated with nausea. This has been going on for a few days.   Denies vomiting, but has had some diarrhea.   Tylenol doesn't help much for headache.     The following portions of the patient's history were reviewed and updated as appropriate: allergies, current medications, past family history, past medical history, past social history, past surgical history and problem list.    Review of Systems    Objective   Vitals:    11/10/22 1023   BP: 152/94   Pulse: 90   Temp: 98.4 °F (36.9 °C)   SpO2: 99%     Body mass index is 34.79 kg/m².  Physical Exam  Vitals and nursing note reviewed.   Constitutional:       Appearance: Normal appearance. He is well-developed.   Cardiovascular:      Rate and Rhythm: Normal rate and regular rhythm.      Heart sounds: Normal heart sounds.   Pulmonary:      Effort: Pulmonary effort is normal.      Breath sounds: Normal breath sounds.   Abdominal:      General: Bowel sounds are increased.      Palpations: Abdomen is soft.      Tenderness: There is no abdominal tenderness.   Skin:     General: Skin is warm and dry.   Neurological:      Mental Status: He is alert and oriented to person, place, and time.   Psychiatric:         Speech: Speech normal.         Behavior: Behavior normal.         Thought Content: Thought content normal.         Assessment & Plan   Diagnoses and all orders for this visit:    1. Primary hypertension (Primary)  -     losartan (Cozaar) 50 MG tablet; Take 1 tablet by mouth Daily.  Dispense: 30 tablet; Refill: 1      HTN - will start patient on losartan. He has a follow up appointment in 3 weeks for a physical.     Headache and nausea - the headache is likely due to his elevated blood pressure. He  could have the nausea from his headache. Symptoms are vague. He will need a referral for a colonoscopy and will address this at his appt in 3 weeks.

## 2022-11-16 DIAGNOSIS — R73.01 IFG (IMPAIRED FASTING GLUCOSE): Primary | ICD-10-CM

## 2022-11-16 DIAGNOSIS — Z00.00 HEALTHCARE MAINTENANCE: ICD-10-CM

## 2022-11-17 ENCOUNTER — TREATMENT (OUTPATIENT)
Dept: PHYSICAL THERAPY | Facility: CLINIC | Age: 47
End: 2022-11-17

## 2022-11-17 DIAGNOSIS — M25.462 EFFUSION OF LEFT KNEE: ICD-10-CM

## 2022-11-17 DIAGNOSIS — M17.12 PRIMARY OSTEOARTHRITIS OF LEFT KNEE: Primary | ICD-10-CM

## 2022-11-17 LAB
ALBUMIN SERPL-MCNC: 4.5 G/DL (ref 3.5–5.2)
ALBUMIN/GLOB SERPL: 1.8 G/DL
ALP SERPL-CCNC: 81 U/L (ref 39–117)
ALT SERPL-CCNC: 44 U/L (ref 1–41)
AST SERPL-CCNC: 24 U/L (ref 1–40)
BASOPHILS # BLD AUTO: 0.05 10*3/MM3 (ref 0–0.2)
BASOPHILS NFR BLD AUTO: 0.7 % (ref 0–1.5)
BILIRUB SERPL-MCNC: 1.5 MG/DL (ref 0–1.2)
BUN SERPL-MCNC: 12 MG/DL (ref 6–20)
BUN/CREAT SERPL: 12.4 (ref 7–25)
CALCIUM SERPL-MCNC: 9.7 MG/DL (ref 8.6–10.5)
CHLORIDE SERPL-SCNC: 102 MMOL/L (ref 98–107)
CHOLEST SERPL-MCNC: 174 MG/DL (ref 0–200)
CO2 SERPL-SCNC: 28 MMOL/L (ref 22–29)
CREAT SERPL-MCNC: 0.97 MG/DL (ref 0.76–1.27)
EGFRCR SERPLBLD CKD-EPI 2021: 97.5 ML/MIN/1.73
EOSINOPHIL # BLD AUTO: 0.26 10*3/MM3 (ref 0–0.4)
EOSINOPHIL NFR BLD AUTO: 3.5 % (ref 0.3–6.2)
ERYTHROCYTE [DISTWIDTH] IN BLOOD BY AUTOMATED COUNT: 13.3 % (ref 12.3–15.4)
GLOBULIN SER CALC-MCNC: 2.5 GM/DL
GLUCOSE SERPL-MCNC: 97 MG/DL (ref 65–99)
HBA1C MFR BLD: 5 % (ref 4.8–5.6)
HCT VFR BLD AUTO: 38.9 % (ref 37.5–51)
HDLC SERPL-MCNC: 39 MG/DL (ref 40–60)
HGB BLD-MCNC: 13.5 G/DL (ref 13–17.7)
IMM GRANULOCYTES # BLD AUTO: 0.02 10*3/MM3 (ref 0–0.05)
IMM GRANULOCYTES NFR BLD AUTO: 0.3 % (ref 0–0.5)
LDLC SERPL CALC-MCNC: 109 MG/DL (ref 0–100)
LDLC/HDLC SERPL: 2.72 {RATIO}
LYMPHOCYTES # BLD AUTO: 1.56 10*3/MM3 (ref 0.7–3.1)
LYMPHOCYTES NFR BLD AUTO: 21 % (ref 19.6–45.3)
MCH RBC QN AUTO: 30.4 PG (ref 26.6–33)
MCHC RBC AUTO-ENTMCNC: 34.7 G/DL (ref 31.5–35.7)
MCV RBC AUTO: 87.6 FL (ref 79–97)
MONOCYTES # BLD AUTO: 0.74 10*3/MM3 (ref 0.1–0.9)
MONOCYTES NFR BLD AUTO: 10 % (ref 5–12)
NEUTROPHILS # BLD AUTO: 4.8 10*3/MM3 (ref 1.7–7)
NEUTROPHILS NFR BLD AUTO: 64.5 % (ref 42.7–76)
NRBC BLD AUTO-RTO: 0 /100 WBC (ref 0–0.2)
PLATELET # BLD AUTO: 278 10*3/MM3 (ref 140–450)
POTASSIUM SERPL-SCNC: 4 MMOL/L (ref 3.5–5.2)
PROT SERPL-MCNC: 7 G/DL (ref 6–8.5)
RBC # BLD AUTO: 4.44 10*6/MM3 (ref 4.14–5.8)
SODIUM SERPL-SCNC: 140 MMOL/L (ref 136–145)
TRIGL SERPL-MCNC: 144 MG/DL (ref 0–150)
TSH SERPL DL<=0.005 MIU/L-ACNC: 0.63 UIU/ML (ref 0.27–4.2)
VLDLC SERPL CALC-MCNC: 26 MG/DL (ref 5–40)
WBC # BLD AUTO: 7.43 10*3/MM3 (ref 3.4–10.8)

## 2022-11-17 PROCEDURE — 97110 THERAPEUTIC EXERCISES: CPT | Performed by: PHYSICAL THERAPIST

## 2022-11-17 NOTE — PROGRESS NOTES
Physical Therapy Daily Treatment Note  [address] [facility] [address], [address]  [place] [address]  Visit # 2      Subjective Evaluation    History of Present Illness    Subjective comment: Pt that his (L) knee is a little more stiff than his (R), but denies any pain.       Objective   See Exercise, Manual, and Modality Logs for complete treatment.   Added standing heel raises, standing hip abd, squats and LAQ    Assessment & Plan     Assessment    Assessment details: Pt completed treatment with no c/o pain in his (L) knee.  Pt was able to progress reps on his prescribed exercises with no issue.  Pt tolerated progression to closed chain exercises with no isuses.  Updated pt's HEP and gave him a copy.  Discussed the importance of doing his HEP when not at therapy.                     Manual Therapy:    0     mins  60264;  Therapeutic Exercise:    25     mins  63394;     Neuromuscular Allison:    0    mins  22558;    Therapeutic Activity:     0     mins  08551;     Gait Trainin     mins  72227;     Ultrasound:     0     mins  34903;    Work Hardening           0      mins 77850  Iontophoresis               0   mins 71113  E-Stim                          _0_ mins 00750 ( )    Timed Treatment:   25   mins   Total Treatment:     25   mins    Pedro Hu PTA  Physical Therapist Assistant

## 2022-11-23 ENCOUNTER — TELEPHONE (OUTPATIENT)
Dept: PHYSICAL THERAPY | Facility: CLINIC | Age: 47
End: 2022-11-23

## 2022-11-23 ENCOUNTER — TREATMENT (OUTPATIENT)
Dept: PHYSICAL THERAPY | Facility: CLINIC | Age: 47
End: 2022-11-23

## 2022-11-23 DIAGNOSIS — M25.462 EFFUSION OF LEFT KNEE: ICD-10-CM

## 2022-11-23 DIAGNOSIS — M17.12 PRIMARY OSTEOARTHRITIS OF LEFT KNEE: Primary | ICD-10-CM

## 2022-11-23 PROCEDURE — 97530 THERAPEUTIC ACTIVITIES: CPT | Performed by: PHYSICAL THERAPIST

## 2022-11-23 PROCEDURE — 97110 THERAPEUTIC EXERCISES: CPT | Performed by: PHYSICAL THERAPIST

## 2022-11-23 NOTE — PROGRESS NOTES
Physical Therapy Daily Treatment Note  [address] [facility], [address]  [place] [address]  Visit # 3      Subjective Evaluation    History of Present Illness    Subjective comment: Pt denies any knee pain today.       Objective   See Exercise, Manual, and Modality Logs for complete treatment.       Assessment & Plan     Assessment    Assessment details: Pt completed treatment with no c/o pain in (L) knee.  He was able to progress reps on strengthening exercises with no issues. Pt tolerated addition of step up for functional strength. Continue to progress as tolerated.                     Manual Therapy:    0     mins  93179;  Therapeutic Exercise:    18     mins  39170;     Neuromuscular Allison:    0    mins  71350;    Therapeutic Activity:     10     mins  34813;     Gait Trainin     mins  27108;     Ultrasound:     0     mins  66980;    Work Hardening           0      mins 61060  Iontophoresis               0   mins 43882  E-Stim                          _0_ mins 77928 ( )    Timed Treatment:   28   mins   Total Treatment:     28   mins    Pedro Hu PTA  Physical Therapist Assistant

## 2022-11-30 ENCOUNTER — TREATMENT (OUTPATIENT)
Dept: PHYSICAL THERAPY | Facility: CLINIC | Age: 47
End: 2022-11-30

## 2022-11-30 DIAGNOSIS — M25.462 EFFUSION OF LEFT KNEE: ICD-10-CM

## 2022-11-30 DIAGNOSIS — M17.12 PRIMARY OSTEOARTHRITIS OF LEFT KNEE: Primary | ICD-10-CM

## 2022-11-30 PROCEDURE — 97110 THERAPEUTIC EXERCISES: CPT | Performed by: PHYSICAL THERAPIST

## 2022-11-30 PROCEDURE — 97530 THERAPEUTIC ACTIVITIES: CPT | Performed by: PHYSICAL THERAPIST

## 2022-11-30 NOTE — PROGRESS NOTES
"Physical Therapy Daily Treatment Note  [address] [facility] [address], [address]  [place] [address]  Visit # 4      Subjective Evaluation    History of Present Illness    Subjective comment: Pt reports that his (L) knee is feeling better.  \"it's just stiff\"       Objective   See Exercise, Manual, and Modality Logs for complete treatment.   Added hip bridges, s/l hip abd/add    Assessment & Plan     Assessment    Assessment details: Pt completed treatment with no c/o pain.  Pt tolerated progressions in his program with no issues.  Pt was observed using reciprocal gait to ascend a flight of stairs.  Pt was tentative on his (L) LE when descending.  Added heel tip/step down for increased eccentric (L) LE strength.  Continue to progress as tolarated    Goals  Plan Goals: STG (4 weeks)  1.  Pt will be I with HEP for improved left knee AROM.-MET  2.  Pt will report decreased left knee pain at worst from 9/10 to 4/10.-MET  3.  Pt will be able to use reciprocal gait on stairs.-MET  LTG (6 weeks)  1.  Pt will be I with left LE strengthening /stabilization program for return to previous activity level.    2.  Pt will demonstrate improved left knee ROM 0 to 130 degrees for ease with all functional tasks.  3.  Pt will demonstrate 5/5 left knee strength for stability with all weight bearing tasks.  4. Pt will report improved perceived function via LEFS from 38% to 20% disability.                     Manual Therapy:    0     mins  77388;  Therapeutic Exercise:    25     mins  62125;     Neuromuscular Allison:    0    mins  14000;    Therapeutic Activity:     18     mins  52452;     Gait Trainin     mins  74648;     Ultrasound:     0     mins  05618;    Work Hardening           0      mins 64149  Iontophoresis               0   mins 83693  E-Stim                          _0_ mins 29166 ( )    Timed Treatment:   43   mins   Total Treatment:     43   mins    Pedro Hu PTA  Physical Therapist Assistant  "

## 2022-12-07 ENCOUNTER — TELEPHONE (OUTPATIENT)
Dept: PHYSICAL THERAPY | Facility: CLINIC | Age: 47
End: 2022-12-07

## 2022-12-07 NOTE — TELEPHONE ENCOUNTER
L/M ABOUT MISSED APPT ON 12/7/22. ADVISED NEXT APPT MUST BE ON PT FOR REASSESSMENT. ASKED PATIENT TO CALL BACK TO RESCHEDULE AND GAVE CALLBACK NUMBER.

## 2022-12-12 ENCOUNTER — OFFICE VISIT (OUTPATIENT)
Dept: INTERNAL MEDICINE | Facility: CLINIC | Age: 47
End: 2022-12-12

## 2022-12-12 ENCOUNTER — LAB (OUTPATIENT)
Dept: LAB | Facility: HOSPITAL | Age: 47
End: 2022-12-12

## 2022-12-12 VITALS
OXYGEN SATURATION: 96 % | TEMPERATURE: 98.2 F | HEART RATE: 94 BPM | DIASTOLIC BLOOD PRESSURE: 88 MMHG | SYSTOLIC BLOOD PRESSURE: 132 MMHG | HEIGHT: 72 IN | WEIGHT: 260.2 LBS | BODY MASS INDEX: 35.24 KG/M2

## 2022-12-12 DIAGNOSIS — I10 PRIMARY HYPERTENSION: Primary | ICD-10-CM

## 2022-12-12 DIAGNOSIS — M1A.0790 CHRONIC GOUT OF FOOT, UNSPECIFIED CAUSE, UNSPECIFIED LATERALITY: ICD-10-CM

## 2022-12-12 DIAGNOSIS — Z23 NEED FOR INFLUENZA VACCINATION: ICD-10-CM

## 2022-12-12 DIAGNOSIS — Z79.899 MEDICATION MANAGEMENT: ICD-10-CM

## 2022-12-12 LAB
LITHIUM SERPL-SCNC: 1 MMOL/L (ref 0.6–1.2)
URATE SERPL-MCNC: 10.8 MG/DL (ref 3.4–7)

## 2022-12-12 PROCEDURE — 84550 ASSAY OF BLOOD/URIC ACID: CPT | Performed by: NURSE PRACTITIONER

## 2022-12-12 PROCEDURE — 80178 ASSAY OF LITHIUM: CPT | Performed by: NURSE PRACTITIONER

## 2022-12-12 PROCEDURE — 90686 IIV4 VACC NO PRSV 0.5 ML IM: CPT | Performed by: NURSE PRACTITIONER

## 2022-12-12 PROCEDURE — 99214 OFFICE O/P EST MOD 30 MIN: CPT | Performed by: NURSE PRACTITIONER

## 2022-12-12 PROCEDURE — 36415 COLL VENOUS BLD VENIPUNCTURE: CPT | Performed by: NURSE PRACTITIONER

## 2022-12-12 PROCEDURE — 90471 IMMUNIZATION ADMIN: CPT | Performed by: NURSE PRACTITIONER

## 2022-12-12 RX ORDER — ALLOPURINOL 300 MG/1
300 TABLET ORAL DAILY
Qty: 30 TABLET | Refills: 1 | Status: SHIPPED | OUTPATIENT
Start: 2022-12-12 | End: 2023-02-23

## 2022-12-12 RX ORDER — BUPROPION HYDROCHLORIDE 150 MG/1
150 TABLET ORAL DAILY PRN
COMMUNITY
Start: 2022-12-06

## 2022-12-12 NOTE — PROGRESS NOTES
Subjective   Wang Tee is a 47 y.o. male. Patient is here today for   Chief Complaint   Patient presents with   • Hypertension   • Gout   • Results     Wants to review lab results   • Medication Question     Patient wants to know about possibility of taking medication to regulate gout   .    History of Present Illness   Patient is here to follow up on hypertension which is controlled with current medications. Denies chest pain, headaches.     Patient is wanting to discuss treatment for gout.  States that he was on allopurinol in the past.  He has not had a flareup in a while, but is wondering if needs to be put back on medication.    Patient is also here to review lab results    The following portions of the patient's history were reviewed and updated as appropriate: allergies, current medications, past family history, past medical history, past social history, past surgical history and problem list.    Review of Systems    Objective     Vitals:    12/12/22 1127   BP: 132/88   Pulse: 94   Temp: 98.2 °F (36.8 °C)   SpO2: 96%     Body mass index is 35.28 kg/m².    Orders Only on 11/16/2022   Component Date Value Ref Range Status   • Glucose 11/16/2022 97  65 - 99 mg/dL Final   • BUN 11/16/2022 12  6 - 20 mg/dL Final   • Creatinine 11/16/2022 0.97  0.76 - 1.27 mg/dL Final   • EGFR Result 11/16/2022 97.5  >60.0 mL/min/1.73 Final    Comment: National Kidney Foundation and American Society of  Nephrology (ASN) Task Force recommended calculation based  on the Chronic Kidney Disease Epidemiology Collaboration  (CKD-EPI) equation refit without adjustment for race.  GFR Normal >60  Chronic Kidney Disease <60  Kidney Failure <15     • BUN/Creatinine Ratio 11/16/2022 12.4  7.0 - 25.0 Final   • Sodium 11/16/2022 140  136 - 145 mmol/L Final   • Potassium 11/16/2022 4.0  3.5 - 5.2 mmol/L Final   • Chloride 11/16/2022 102  98 - 107 mmol/L Final   • Total CO2 11/16/2022 28.0  22.0 - 29.0 mmol/L Final   • Calcium 11/16/2022 9.7   8.6 - 10.5 mg/dL Final   • Total Protein 11/16/2022 7.0  6.0 - 8.5 g/dL Final   • Albumin 11/16/2022 4.50  3.50 - 5.20 g/dL Final   • Globulin 11/16/2022 2.5  gm/dL Final   • A/G Ratio 11/16/2022 1.8  g/dL Final   • Total Bilirubin 11/16/2022 1.5 (H)  0.0 - 1.2 mg/dL Final   • Alkaline Phosphatase 11/16/2022 81  39 - 117 U/L Final   • AST (SGOT) 11/16/2022 24  1 - 40 U/L Final   • ALT (SGPT) 11/16/2022 44 (H)  1 - 41 U/L Final   • Total Cholesterol 11/16/2022 174  0 - 200 mg/dL Final    Comment: Cholesterol Reference Ranges  (U.S. Department of Health and Human Services ATP III  Classifications)  Desirable          <200 mg/dL  Borderline High    200-239 mg/dL  High Risk          >240 mg/dL  Triglyceride Reference Ranges  (U.S. Department of Health and Human Services ATP III  Classifications)  Normal           <150 mg/dL  Borderline High  150-199 mg/dL  High             200-499 mg/dL  Very High        >500 mg/dL  HDL Reference Ranges  (U.S. Department of Health and Human Services ATP III  Classifications)  Low     <40 mg/dl (major risk factor for CHD)  High    >60 mg/dl ('negative' risk factor for CHD)  LDL Reference Ranges  (U.S. Department of Health and Human Services ATP III  Classifications)  Optimal          <100 mg/dL  Near Optimal     100-129 mg/dL  Borderline High  130-159 mg/dL  High             160-189 mg/dL  Very High        >189 mg/dL     • Triglycerides 11/16/2022 144  0 - 150 mg/dL Final   • HDL Cholesterol 11/16/2022 39 (L)  40 - 60 mg/dL Final   • VLDL Cholesterol [place] 11/16/2022 26  5 - 40 mg/dL Final   • LDL Chol Calc (NIH) 11/16/2022 109 (H)  0 - 100 mg/dL Final   • LDL/HDL RATIO 11/16/2022 2.72   Final   • TSH 11/16/2022 0.631  0.270 - 4.200 uIU/mL Final   • WBC 11/16/2022 7.43  3.40 - 10.80 10*3/mm3 Final   • RBC 11/16/2022 4.44  4.14 - 5.80 10*6/mm3 Final   • Hemoglobin 11/16/2022 13.5  13.0 - 17.7 g/dL Final   • Hematocrit 11/16/2022 38.9  37.5 - 51.0 % Final   • MCV 11/16/2022 87.6  79.0 - 97.0  fL Final   • MCH 11/16/2022 30.4  26.6 - 33.0 pg Final   • MCHC 11/16/2022 34.7  31.5 - 35.7 g/dL Final   • RDW 11/16/2022 13.3  12.3 - 15.4 % Final   • Platelets 11/16/2022 278  140 - 450 10*3/mm3 Final   • Neutrophil Rel % 11/16/2022 64.5  42.7 - 76.0 % Final   • Lymphocyte Rel % 11/16/2022 21.0  19.6 - 45.3 % Final   • Monocyte Rel % 11/16/2022 10.0  5.0 - 12.0 % Final   • Eosinophil Rel % 11/16/2022 3.5  0.3 - 6.2 % Final   • Basophil Rel % 11/16/2022 0.7  0.0 - 1.5 % Final   • Neutrophils Absolute 11/16/2022 4.80  1.70 - 7.00 10*3/mm3 Final   • Lymphocytes Absolute 11/16/2022 1.56  0.70 - 3.10 10*3/mm3 Final   • Monocytes Absolute 11/16/2022 0.74  0.10 - 0.90 10*3/mm3 Final   • Eosinophils Absolute 11/16/2022 0.26  0.00 - 0.40 10*3/mm3 Final   • Basophils Absolute 11/16/2022 0.05  0.00 - 0.20 10*3/mm3 Final   • Immature Granulocyte Rel % 11/16/2022 0.3  0.0 - 0.5 % Final   • Immature Grans Absolute 11/16/2022 0.02  0.00 - 0.05 10*3/mm3 Final   • nRBC 11/16/2022 0.0  0.0 - 0.2 /100 WBC Final   • Hemoglobin A1C 11/16/2022 5.00  4.80 - 5.60 % Final    Comment: Hemoglobin A1C Ranges:  Increased Risk for Diabetes  5.7% to 6.4%  Diabetes                     >= 6.5%  Diabetic Goal                < 7.0%       Reviewed labs with patient.     Physical Exam  Vitals and nursing note reviewed.   Constitutional:       Appearance: Normal appearance. He is well-developed.   Cardiovascular:      Rate and Rhythm: Normal rate and regular rhythm.      Heart sounds: Normal heart sounds.   Pulmonary:      Effort: Pulmonary effort is normal.      Breath sounds: Normal breath sounds.   Skin:     General: Skin is warm and dry.   Neurological:      Mental Status: He is alert and oriented to person, place, and time.   Psychiatric:         Speech: Speech normal.         Behavior: Behavior normal.         Thought Content: Thought content normal.         Assessment & Plan   Diagnoses and all orders for this visit:    1. Primary hypertension  (Primary)    2. Chronic gout of foot, unspecified cause, unspecified laterality  -     Uric Acid  -     allopurinol (Zyloprim) 300 MG tablet; Take 1 tablet by mouth Daily.  Dispense: 30 tablet; Refill: 1    3. Medication management  -     Lithium level    HTN -continue losartan 50 mg daily    Gout -we will check a uric acid level today.  He did go downstairs to get labs checked and his uric acid level is greater than 10, so we will start allopurinol 300 mg daily.  Recheck that lab in 1 month    Patient is also on lithium per his psychiatrist, so since he is getting labs drawn for his uric acid level, will check a lithium level today             Current Outpatient Medications:   •  ascorbic acid (VITAMIN C) 500 MG tablet, Take 500 mg by mouth Daily., Disp: , Rfl:   •  buPROPion XL (WELLBUTRIN XL) 150 MG 24 hr tablet, Take 150 mg by mouth Daily As Needed., Disp: , Rfl:   •  cholecalciferol (VITAMIN D3) 10 MCG (400 UNIT) tablet, Take  by mouth Daily., Disp: , Rfl:   •  clonazePAM (KlonoPIN) 0.5 MG tablet, Take 1 tablet by mouth At Night As Needed., Disp: , Rfl:   •  co-enzyme Q-10 30 MG capsule, Take  by mouth Daily., Disp: , Rfl:   •  lithium (ESKALITH) 450 MG CR tablet, Take 1 tablet by mouth Every Night. 450 am, Disp: , Rfl:   •  lithium 600 MG capsule, Take 1 capsule by mouth Every Night. 600 PM, Disp: , Rfl: 0  •  losartan (Cozaar) 50 MG tablet, Take 1 tablet by mouth Daily., Disp: 30 tablet, Rfl: 1  •  Melatonin 10 MG tablet, Take 1 tablet by mouth At Night As Needed., Disp: , Rfl:   •  multivitamin with minerals tablet tablet, Take 1 tablet by mouth Daily., Disp: , Rfl:   •  triamcinolone (KENALOG) 0.1 % cream, Apply 1 application topically to the appropriate area as directed 2 (Two) Times a Day., Disp: , Rfl:   •  allopurinol (Zyloprim) 300 MG tablet, Take 1 tablet by mouth Daily., Disp: 30 tablet, Rfl: 1

## 2022-12-13 DIAGNOSIS — M1A.0790 CHRONIC GOUT OF FOOT, UNSPECIFIED CAUSE, UNSPECIFIED LATERALITY: Primary | ICD-10-CM

## 2023-01-12 DIAGNOSIS — I10 PRIMARY HYPERTENSION: ICD-10-CM

## 2023-01-12 RX ORDER — LOSARTAN POTASSIUM 50 MG/1
TABLET ORAL
Qty: 30 TABLET | Refills: 0 | Status: SHIPPED | OUTPATIENT
Start: 2023-01-12 | End: 2023-02-23

## 2023-01-17 ENCOUNTER — OFFICE VISIT (OUTPATIENT)
Dept: INTERNAL MEDICINE | Facility: CLINIC | Age: 48
End: 2023-01-17
Payer: COMMERCIAL

## 2023-01-17 VITALS
BODY MASS INDEX: 36.03 KG/M2 | SYSTOLIC BLOOD PRESSURE: 138 MMHG | OXYGEN SATURATION: 97 % | HEART RATE: 76 BPM | WEIGHT: 266 LBS | TEMPERATURE: 98.2 F | DIASTOLIC BLOOD PRESSURE: 90 MMHG | HEIGHT: 72 IN

## 2023-01-17 DIAGNOSIS — M25.531 RIGHT WRIST PAIN: Primary | ICD-10-CM

## 2023-01-17 PROBLEM — G24.8 FOCAL DYSTONIA: Status: ACTIVE | Noted: 2018-07-05

## 2023-01-17 PROCEDURE — 99213 OFFICE O/P EST LOW 20 MIN: CPT | Performed by: NURSE PRACTITIONER

## 2023-01-17 RX ORDER — PREDNISONE 20 MG/1
TABLET ORAL
COMMUNITY
Start: 2023-01-10

## 2023-01-17 RX ORDER — CYCLOBENZAPRINE HCL 10 MG
10 TABLET ORAL
COMMUNITY
Start: 2023-01-15 | End: 2023-01-29

## 2023-01-17 RX ORDER — BUPROPION HYDROCHLORIDE 100 MG/1
TABLET, EXTENDED RELEASE ORAL
COMMUNITY
Start: 2023-01-03

## 2023-01-17 RX ORDER — CYCLOBENZAPRINE HCL 10 MG
10 TABLET ORAL 3 TIMES DAILY PRN
COMMUNITY
Start: 2023-01-15

## 2023-01-17 NOTE — PROGRESS NOTES
Subjective   Wang Ennis is a 47 y.o. male. Patient is here today for   Chief Complaint   Patient presents with   • Wrist Pain     Patient woke up with a swollen wrist, today wrist is no long swollen but patient is still in pain , xrays    .    History of Present Illness   C/o right wrist pain for about 2 weeks associated with swelling.   2023 went to urgent care. Prescribed prednisone and was put in a brace. Went back to urgent care on 1/15/2023. He was prescribed flexeril which has helped.  He woke up and it was swollen. No specific injury.   He has been taking tylenol with no relief.   Patient does have gout and takes allupurinol daily.    The following portions of the patient's history were reviewed and updated as appropriate: allergies, current medications, past family history, past medical history, past social history, past surgical history and problem list.    Review of Systems    Objective   Vitals:    23 1444   BP: 138/90   Pulse: 76   Temp: 98.2 °F (36.8 °C)   SpO2: 97%     Body mass index is 36.07 kg/m².  Physical Exam  Vitals and nursing note reviewed.   Constitutional:       Appearance: Normal appearance. He is well-developed.   Cardiovascular:      Rate and Rhythm: Normal rate and regular rhythm.      Heart sounds: Normal heart sounds.   Pulmonary:      Effort: Pulmonary effort is normal.      Breath sounds: Normal breath sounds.   Musculoskeletal:      Right wrist: Swelling and tenderness present. Decreased range of motion. Normal pulse.   Skin:     General: Skin is warm and dry.   Neurological:      Mental Status: He is alert and oriented to person, place, and time.   Psychiatric:         Speech: Speech normal.         Behavior: Behavior normal.         Thought Content: Thought content normal.       Reviewed x-ray done on 2023  RADIOLOGY REPORT     FACILITY:  [facility]   UNIT/AGE/GENDER: JUNAIDICC-FC  OP      AGE:47 Y          SEX:M   PATIENT NAME/:  WANG ENNIS, S     1975   UNIT NUMBER:  [id number]   ACCOUNT NUMBER:  [id number]   ACCESSION NUMBER:  [id number]     EXAMINATION: XR WRIST COMP MIN 3 VW RT     DATE: 01/07/2023     COMPARISON: None available     INDICATION: PAIN.         FINDINGS: There is no evidence of a fracture or other acute osseous abnormality. Alignment is within normal limits. Joint spaces are normally maintained.         IMPRESSION: No acute radiographic abnormality.     Dictated by: JOSE Kaufman M.D.     Images and Report reviewed and interpreted by: JOSE Kaufman M.D.     Assessment & Plan   Diagnoses and all orders for this visit:    1. Right wrist pain (Primary)  -     Ambulatory Referral to Orthopedic Surgery      Patient will have his uric acid level checked. Referral placed to ortho

## 2023-02-22 DIAGNOSIS — M1A.0790 CHRONIC GOUT OF FOOT, UNSPECIFIED CAUSE, UNSPECIFIED LATERALITY: ICD-10-CM

## 2023-02-22 DIAGNOSIS — I10 PRIMARY HYPERTENSION: ICD-10-CM

## 2023-02-23 RX ORDER — LOSARTAN POTASSIUM 50 MG/1
TABLET ORAL
Qty: 30 TABLET | Refills: 0 | Status: SHIPPED | OUTPATIENT
Start: 2023-02-23

## 2023-02-23 RX ORDER — ALLOPURINOL 300 MG/1
TABLET ORAL
Qty: 30 TABLET | Refills: 0 | Status: SHIPPED | OUTPATIENT
Start: 2023-02-23

## 2023-04-18 DIAGNOSIS — M1A.0790 CHRONIC GOUT OF FOOT, UNSPECIFIED CAUSE, UNSPECIFIED LATERALITY: ICD-10-CM

## 2023-04-18 LAB — URATE SERPL-MCNC: 6 MG/DL (ref 3.4–7)

## 2023-04-18 RX ORDER — ALLOPURINOL 300 MG/1
300 TABLET ORAL DAILY
Qty: 90 TABLET | Refills: 0 | Status: SHIPPED | OUTPATIENT
Start: 2023-04-18

## 2023-04-24 ENCOUNTER — DOCUMENTATION (OUTPATIENT)
Dept: PHYSICAL THERAPY | Facility: CLINIC | Age: 48
End: 2023-04-24
Payer: COMMERCIAL

## 2023-06-13 DIAGNOSIS — I10 PRIMARY HYPERTENSION: ICD-10-CM

## 2023-06-13 RX ORDER — LOSARTAN POTASSIUM 50 MG/1
TABLET ORAL
Qty: 30 TABLET | Refills: 0 | Status: SHIPPED | OUTPATIENT
Start: 2023-06-13

## 2023-07-28 DIAGNOSIS — I10 PRIMARY HYPERTENSION: ICD-10-CM

## 2023-07-28 RX ORDER — LOSARTAN POTASSIUM 50 MG/1
TABLET ORAL
Qty: 30 TABLET | Refills: 0 | Status: SHIPPED | OUTPATIENT
Start: 2023-07-28

## 2023-08-23 DIAGNOSIS — I10 PRIMARY HYPERTENSION: ICD-10-CM

## 2023-08-23 RX ORDER — LOSARTAN POTASSIUM 50 MG/1
TABLET ORAL
Qty: 30 TABLET | Refills: 0 | Status: SHIPPED | OUTPATIENT
Start: 2023-08-23 | End: 2023-08-25 | Stop reason: SDUPTHER

## 2023-08-25 DIAGNOSIS — I10 PRIMARY HYPERTENSION: ICD-10-CM

## 2023-08-25 RX ORDER — LOSARTAN POTASSIUM 50 MG/1
50 TABLET ORAL DAILY
Qty: 30 TABLET | Refills: 0 | Status: SHIPPED | OUTPATIENT
Start: 2023-08-25

## 2023-08-25 NOTE — TELEPHONE ENCOUNTER
Caller: Latricia Tee    Relationship: Mother    Best call back number: [phone]    Requested Prescriptions:   Requested Prescriptions     Pending Prescriptions Disp Refills    losartan (COZAAR) 50 MG tablet 30 tablet 0     Sig: Take 1 tablet by mouth Daily.        Pharmacy where request should be sent: [facility] [address] [place] [address] [address] [facility] - [phone]  - [phone]     Last office visit with prescribing clinician: 1/17/2023   Last telemedicine visit with prescribing clinician: Visit date not found   Next office visit with prescribing clinician: Visit date not found     Additional details provided by patient: PATIENT'S MOTHER CALLING STATING [facility] FOR THIS MEDICATION.    Does the patient have less than a 3 day supply:  [x] Yes  [] No    Would you like a call back once the refill request has been completed: [x] Yes [] No    If the office needs to give you a call back, can they leave a voicemail: [x] Yes [] No    Nicolasa Mcclendon   08/25/23 16:00 EDT

## 2023-10-16 DIAGNOSIS — I10 PRIMARY HYPERTENSION: ICD-10-CM

## 2023-10-18 RX ORDER — LOSARTAN POTASSIUM 50 MG/1
50 TABLET ORAL DAILY
Qty: 30 TABLET | Refills: 0 | OUTPATIENT
Start: 2023-10-18

## 2023-12-18 DIAGNOSIS — I10 PRIMARY HYPERTENSION: ICD-10-CM

## 2023-12-18 RX ORDER — LOSARTAN POTASSIUM 50 MG/1
50 TABLET ORAL DAILY
Qty: 30 TABLET | Refills: 0 | OUTPATIENT
Start: 2023-12-18

## 2023-12-22 DIAGNOSIS — I10 PRIMARY HYPERTENSION: ICD-10-CM

## 2023-12-22 RX ORDER — LOSARTAN POTASSIUM 50 MG/1
50 TABLET ORAL DAILY
Qty: 30 TABLET | Refills: 0 | Status: SHIPPED | OUTPATIENT
Start: 2023-12-22

## 2024-01-11 DIAGNOSIS — I10 PRIMARY HYPERTENSION: ICD-10-CM

## 2024-01-12 ENCOUNTER — TELEPHONE (OUTPATIENT)
Dept: INTERNAL MEDICINE | Facility: CLINIC | Age: 49
End: 2024-01-12
Payer: COMMERCIAL

## 2024-01-12 DIAGNOSIS — I10 PRIMARY HYPERTENSION: ICD-10-CM

## 2024-01-12 RX ORDER — LOSARTAN POTASSIUM 50 MG/1
50 TABLET ORAL DAILY
Qty: 30 TABLET | Refills: 0 | OUTPATIENT
Start: 2024-01-12

## 2024-01-12 NOTE — TELEPHONE ENCOUNTER
Caller: Mayo Teein    Relationship: Self    Best call back number: [phone]      Requested Prescriptions:   Requested Prescriptions     Pending Prescriptions Disp Refills    losartan (COZAAR) 50 MG tablet 30 tablet 0     Sig: Take 1 tablet by mouth Daily.        Pharmacy where request should be sent: [facility] [address] [place] [address] [address] [place] [address] - [phone]  - [phone]     Last office visit with prescribing clinician: 1/17/2023   Last telemedicine visit with prescribing clinician: Visit date not found   Next office visit with prescribing clinician: Visit date not found     Additional details provided by patient:     Does the patient have less than a 3 day supply:  [] Yes  [x] No    Would you like a call back once the refill request has been completed: [] Yes [] No    If the office needs to give you a call back, can they leave a voicemail: [] Yes [] No    PLEASE ADVISE.    Radha Cardozo Rep   01/12/24 15:26 EST

## 2024-01-16 DIAGNOSIS — I10 PRIMARY HYPERTENSION: ICD-10-CM

## 2024-01-16 RX ORDER — LOSARTAN POTASSIUM 50 MG/1
50 TABLET ORAL DAILY
Qty: 30 TABLET | Refills: 0 | OUTPATIENT
Start: 2024-01-16

## 2024-01-16 NOTE — TELEPHONE ENCOUNTER
Left voicemail at at numbers in chart [phone] and [phone]. Told patient he has not been seen in over a year and will need a fasting lab appointment and physical scheduled with Julia Knapp before any more refills.

## 2024-01-16 NOTE — H&P
[facility]   HISTORY AND PHYSICAL    Patient Name: Wang Tee  : 1975  MRN: [id number]  Primary Care Physician:  Blanche Solares MD  Date of admission: 2021    Subjective   Subjective     Chief Complaint:   Chief Complaint   Patient presents with   • Chest Pain         HPI:    Wang Tee is a 46 y.o. male who presented to the emergency department today with complaint of chest discomfort.  States he has never had this previously.  He describes it as tightness.  He denies any exertional or pleuritic component to pain.    He endorses a history of gout but reports he has not been compliant with his allopurinol.  He denies any shortness of breath, fever, chills or hemoptysis.  He does have a tracheostomy that has had for the last 3 years due to vocal cord dysfunction thought to be a medical reaction related to Risperdal medication he was previously taking.     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 Otherwise pertinent FHx was reviewed and not pertinent to current issue.    Social History:  reports that he has never smoked. He has never used smokeless tobacco. He reports that he does not drink alcohol and does not use drugs.    Home Medications:  albuterol sulfate HFA, allopurinol, benztropine, lithium, and risperiDONE    Allergies:  Allergies   Allergen Reactions   • Sulfa Antibiotics    • Sulfamethoxazole-Trimethoprim        Objective   Objective     Vitals:   Temp:  [98.1 °F (36.7 °C)-98.8 °F (37.1 °C)] 98.8 °F (37.1  Speech Therapy      Visit Type: Evaluation  -  Clinical swallow and communication/cognition  Reason for consult: ST consulted for pt admitted d/t ICH in L basal ganglia.     Relevant History/Co-morbidities: Daily ETOH use    SUBJECTIVE  Patient agreed to participate in therapy this date.  Patient verbally agrees to allow the following to be present during the session:  relative    Patient received lethargic in bed. Two relatives present for entirety of session and video  was used as pt is [place] speaking. Pt not oriented, and per family \"sometimes is right and sometimes isn't.\" Unreliable yes/no response.    Functional Cognition:    - Expression is verbal.     - Following commands impaired.    Affect/Behavior: calm and confused  Pain at onset of session:   Patient does not demonstrate pain behaviors.    OBJECTIVE     Oral Mechanism   Oral Motor Assessment: intact      Diet prior to visit: nothing by mouth  - Dentition: some missing dentition  - Feeding: does not feed self  Swallow  Patient positioning: upright in bed  Pretrial vocal quality: weak    Numbers listed with consistency indicate IDDSI diet level.    Thin (0), cup, teaspoon, straw, SLP fed   - Oral phase: intact   - Pharyngeal phase: intact    Teaspoon, pureed (4), SLP fed   - Oral: intact   - Pharyngeal phase: intact    Regular, SLP fed   - Oral: intact   - Pharyngeal phase: intact      Communication/Cognition  Orientation Level:    - Person: oriented    - Place: not oriented (home)    - Month: not oriented    - Year: not oriented    - Reason for hospitalization: not oriented  Attention:      - Sustained attention: impaired  Naming:      - Confrontation: impaired  Auditory Comprehension:      - Localizes to sound: Yes    - Commands 1 unit: mild impairment (75-89% accuracy)    - Commands 2 unit: severe impairment (25-49% accuracy)    - Yes/No questions 2 unit: severe impairment (25-49% accuracy)  Visual Recognition:      - Right inattention                Education:   - Present and ready to learn: patient's family  - Present and not ready to learn: patient  Education provided during session:  - dysphagia, cognition, role of SLP, aspiration precautions and communication  - Results of above outlined education: Verbalizes understanding    ASSESSMENT  Patient will benefit from skilled therapy to address listed impairments and functional limitations.    Interferring components: cognitive deficits and current medical conditions    Discharge needs based on today's assessment:  - Current level of function: significantly below baseline level of function  - ADL / IADLs (activities / instrumental activities of daily living) requiring support at discharge: nutrition management (eating/drinking)  - Impairments that require further therapy intervention: dysphagia and cognition    Patient presents with grossly functional oropharyngeal swallowing abilities. Most current limitation is the patient's cognitive status. Recommend general diet with thin liquids with 1:1 feeding assist and general aspiration precautions. ST to follow regarding tolerance of least restrictive diet x1 more.     Informal cognitive communication assessment completed given severity of pts deficits.  with increased difficulty understanding pt 2/2 low speaking volume, however family also with increased difficulty 2/2 confusion. Patient presents disoriented with unreliable yes/no responses, poor object naming abilities, and poor command following at both the one and two step command levels.  Acute dysarthria c/b low volume, reduced articulatory accuracy and imprecision impacting intelligibility at the word level.  Max cues and encouragement were not beneficial at this point of acute cognitive status. ST to follow to address cognitive deficits with a comprehensive cognitive evaluation as pt becomes more alert with increased functional participation.     PLAN (while hospitalized)  Dysphagia  °C)  Heart Rate:  [64-88] 64  Resp:  [16-18] 18  BP: (109-176)/() 134/97  Physical Exam    Constitutional: Awake, alert   Eyes: PERRLA, sclerae anicteric, no conjunctival injection   HENT: NCAT, mucous membranes moist   Neck: Supple, no thyromegaly, no lymphadenopathy, trachea midline, tracheostomy present with clean cannula   Respiratory: Clear to auscultation bilaterally, nonlabored respirations    Cardiovascular: RRR, no murmurs, rubs, or gallops, palpable pedal pulses bilaterally   Gastrointestinal: Positive bowel sounds, soft, nontender, non-distended, obese   Musculoskeletal: No bilateral ankle edema, no clubbing or cyanosis to extremities   Psychiatric: Appropriate affect, cooperative   Neurologic: Oriented x 3, strength symmetric in all extremities, Cranial Nerves grossly intact to confrontation, speech  clear   Skin: No rashes     Result Review    Result Review:  I have personally reviewed the results from the time of this admission to 11/5/2021 20:18 EDT and agree with these findings:  [x]  Laboratory  []  Microbiology  [x]  Radiology  []  EKG/Telemetry   []  Cardiology/Vascular   []  Pathology  []  Old records  []  Other:  Most notable findings include: negative troponin x 2, borderline heart size on chest xray, BNP unremarkable    Assessment/Plan   Assessment / Plan       Active Hospital Problems:  Active Hospital Problems    Diagnosis    • Chest pain      Plan:   Chest pain  -Likely pericarditis  -Cardiology consulted  -NSAIDs & colchicine 0.6mg BID ordered  -Echo in the AM    Bipolar disorder  -Lithium home med continued    History of Gout  -allopurinol held as patient does not routinely take this at home    DVT prophylaxis:  Mechanical DVT prophylaxis orders are present.    CODE STATUS:    Level Of Support Discussed With: Patient  Code Status (Patient has no pulse and is not breathing): CPR (Attempt to Resuscitate)  Medical Interventions (Patient has pulse or is breathing): Full  Support    Admission Status:  I believe this patient meets observation status.    Electronically signed by JUANITA Weir, 11/05/21, 8:18 PM EDT.         I have worn appropriate PPE during this patient encounter, sanitized my hands both with entering and exiting patient's room.   follow up and sptx   Interventions:  Assess tolerance of least restrictive oral diet and communication/cognition evaluation    Plan/Goal Agreement:  Patient agrees with goals and individualized plan of care and family/significant other/caregiver agrees      RECOMMENDATIONS     -Diet:          *Liquid- Thin and Regular    -Medication Administration:         *patient preference    -Feeding Guidelines:          *check for pocketing on right, alternate solids/liquids, constant supervision, distraction free environment, eat slowly only when alert, feed patient/total feeding assistance, sit fully upright for all po intake and small bites/sips    -Speech Reviewed Swallow:         *with patient/family and with clinical caregivers    -Communication Cognition:          *Patient demonstrates acute communication and cognition deficits, will initiate speech therapy.  Patient will require 24/7 supervision at discharge.      GOALS  Review Date: 1/16/2024  Long Term Goals: (to be met by time of discharge from hospital)  Patient will manage Liquid- Thin and Regular diet with optimum safety and efficiency of swallow function without aspiration related [place].    Pt will answer basic yes/no questions given max cues with 70% accuracy to increase ability to relay personal information    Pt will follow two step directions given max cues with 60% accuracy to increase ability to complete ADLS            Documented in the chart in the following areas: .    Assessment.    Patient at End of Session:   Location: in bed  Safety measures: alarm system in place/re-engaged, bed rails x2 and call light within reach  Handoff to: nurse and family/caregiver    I was in the immediate presence of the student and directed the student’s performance of the services. I am responsible for all treatment, assessment, documentation, and billing rendered for this patient.   Naa Benitez, SLP      Therapy procedure time and total treatment time can  be found documented on the Time Entry flowsheet

## 2024-01-30 DIAGNOSIS — E55.9 VITAMIN D DEFICIENCY: ICD-10-CM

## 2024-01-30 DIAGNOSIS — Z00.00 HEALTHCARE MAINTENANCE: Primary | ICD-10-CM

## 2024-01-31 LAB
25(OH)D3+25(OH)D2 SERPL-MCNC: 41.1 NG/ML (ref 30–100)
ALBUMIN SERPL-MCNC: 4.5 G/DL (ref 4.1–5.1)
ALBUMIN/GLOB SERPL: 1.8 {RATIO} (ref 1.2–2.2)
ALP SERPL-CCNC: 73 IU/L (ref 44–121)
ALT SERPL-CCNC: 69 IU/L (ref 0–44)
AST SERPL-CCNC: 34 IU/L (ref 0–40)
BASOPHILS # BLD AUTO: 0 X10E3/UL (ref 0–0.2)
BASOPHILS NFR BLD AUTO: 1 %
BILIRUB SERPL-MCNC: 1.2 MG/DL (ref 0–1.2)
BUN SERPL-MCNC: 12 MG/DL (ref 6–24)
BUN/CREAT SERPL: 11 (ref 9–20)
CALCIUM SERPL-MCNC: 9.6 MG/DL (ref 8.7–10.2)
CHLORIDE SERPL-SCNC: 103 MMOL/L (ref 96–106)
CHOLEST SERPL-MCNC: 153 MG/DL (ref 100–199)
CHOLEST/HDLC SERPL: 4 RATIO (ref 0–5)
CO2 SERPL-SCNC: 22 MMOL/L (ref 20–29)
CREAT SERPL-MCNC: 1.05 MG/DL (ref 0.76–1.27)
EGFRCR SERPLBLD CKD-EPI 2021: 88 ML/MIN/1.73
EOSINOPHIL # BLD AUTO: 0.2 X10E3/UL (ref 0–0.4)
EOSINOPHIL NFR BLD AUTO: 4 %
ERYTHROCYTE [DISTWIDTH] IN BLOOD BY AUTOMATED COUNT: 13 % (ref 11.6–15.4)
GLOBULIN SER CALC-MCNC: 2.5 G/DL (ref 1.5–4.5)
GLUCOSE SERPL-MCNC: 99 MG/DL (ref 70–99)
HCT VFR BLD AUTO: 42.6 % (ref 37.5–51)
HDLC SERPL-MCNC: 38 MG/DL
HGB BLD-MCNC: 14.8 G/DL (ref 13–17.7)
IMM GRANULOCYTES # BLD AUTO: 0 X10E3/UL (ref 0–0.1)
IMM GRANULOCYTES NFR BLD AUTO: 0 %
LDLC SERPL CALC-MCNC: 88 MG/DL (ref 0–99)
LYMPHOCYTES # BLD AUTO: 1.6 X10E3/UL (ref 0.7–3.1)
LYMPHOCYTES NFR BLD AUTO: 27 %
MCH RBC QN AUTO: 30 PG (ref 26.6–33)
MCHC RBC AUTO-ENTMCNC: 34.7 G/DL (ref 31.5–35.7)
MCV RBC AUTO: 86 FL (ref 79–97)
MONOCYTES # BLD AUTO: 0.5 X10E3/UL (ref 0.1–0.9)
MONOCYTES NFR BLD AUTO: 8 %
NEUTROPHILS # BLD AUTO: 3.5 X10E3/UL (ref 1.4–7)
NEUTROPHILS NFR BLD AUTO: 60 %
PLATELET # BLD AUTO: 291 X10E3/UL (ref 150–450)
POTASSIUM SERPL-SCNC: 4.2 MMOL/L (ref 3.5–5.2)
PROT SERPL-MCNC: 7 G/DL (ref 6–8.5)
RBC # BLD AUTO: 4.93 X10E6/UL (ref 4.14–5.8)
SODIUM SERPL-SCNC: 139 MMOL/L (ref 134–144)
T4 FREE SERPL-MCNC: 1.26 NG/DL (ref 0.82–1.77)
TRIGL SERPL-MCNC: 154 MG/DL (ref 0–149)
TSH SERPL DL<=0.005 MIU/L-ACNC: 0.26 UIU/ML (ref 0.45–4.5)
VLDLC SERPL CALC-MCNC: 27 MG/DL (ref 5–40)
WBC # BLD AUTO: 5.8 X10E3/UL (ref 3.4–10.8)

## 2024-02-01 ENCOUNTER — OFFICE VISIT (OUTPATIENT)
Dept: INTERNAL MEDICINE | Facility: CLINIC | Age: 49
End: 2024-02-01
Payer: COMMERCIAL

## 2024-02-01 VITALS
BODY MASS INDEX: 34.75 KG/M2 | SYSTOLIC BLOOD PRESSURE: 138 MMHG | DIASTOLIC BLOOD PRESSURE: 80 MMHG | OXYGEN SATURATION: 98 % | WEIGHT: 256.6 LBS | HEART RATE: 72 BPM | HEIGHT: 72 IN | TEMPERATURE: 98 F

## 2024-02-01 DIAGNOSIS — Z00.00 HEALTHCARE MAINTENANCE: Primary | ICD-10-CM

## 2024-02-01 DIAGNOSIS — M1A.0790 CHRONIC GOUT OF FOOT, UNSPECIFIED CAUSE, UNSPECIFIED LATERALITY: ICD-10-CM

## 2024-02-01 DIAGNOSIS — I10 PRIMARY HYPERTENSION: ICD-10-CM

## 2024-02-01 RX ORDER — ALLOPURINOL 300 MG/1
300 TABLET ORAL DAILY
Qty: 90 TABLET | Refills: 3 | Status: SHIPPED | OUTPATIENT
Start: 2024-02-01

## 2024-02-01 RX ORDER — LOSARTAN POTASSIUM 50 MG/1
50 TABLET ORAL DAILY
Qty: 90 TABLET | Refills: 3 | Status: SHIPPED | OUTPATIENT
Start: 2024-02-01

## 2024-02-02 LAB
URATE SERPL-MCNC: 7 MG/DL (ref 3.8–8.4)
WRITTEN AUTHORIZATION: NORMAL

## 2024-08-19 ENCOUNTER — TELEPHONE (OUTPATIENT)
Dept: INTERNAL MEDICINE | Facility: CLINIC | Age: 49
End: 2024-08-19
Payer: COMMERCIAL

## 2024-08-19 ENCOUNTER — LAB (OUTPATIENT)
Dept: LAB | Facility: HOSPITAL | Age: 49
End: 2024-08-19
Payer: COMMERCIAL

## 2024-08-19 ENCOUNTER — OFFICE VISIT (OUTPATIENT)
Dept: INTERNAL MEDICINE | Facility: CLINIC | Age: 49
End: 2024-08-19
Payer: COMMERCIAL

## 2024-08-19 ENCOUNTER — HOSPITAL ENCOUNTER (OUTPATIENT)
Dept: GENERAL RADIOLOGY | Facility: HOSPITAL | Age: 49
Discharge: HOME | End: 2024-08-19
Payer: COMMERCIAL

## 2024-08-19 VITALS
OXYGEN SATURATION: 98 % | WEIGHT: 256 LBS | SYSTOLIC BLOOD PRESSURE: 160 MMHG | HEART RATE: 73 BPM | HEIGHT: 72 IN | TEMPERATURE: 98.8 F | BODY MASS INDEX: 34.67 KG/M2 | DIASTOLIC BLOOD PRESSURE: 110 MMHG

## 2024-08-19 DIAGNOSIS — R06.2 WHEEZING: ICD-10-CM

## 2024-08-19 DIAGNOSIS — I10 PRIMARY HYPERTENSION: Primary | ICD-10-CM

## 2024-08-19 LAB
ALBUMIN SERPL-MCNC: 4.7 G/DL (ref 3.5–5.2)
ALBUMIN/GLOB SERPL: 1.8 G/DL
ALP SERPL-CCNC: 87 U/L (ref 39–117)
ALT SERPL W P-5'-P-CCNC: 61 U/L (ref 1–41)
ANION GAP SERPL CALCULATED.3IONS-SCNC: 8 MMOL/L (ref 5–15)
AST SERPL-CCNC: 32 U/L (ref 1–40)
BASOPHILS # BLD AUTO: 0.03 10*3/MM3 (ref 0–0.2)
BASOPHILS NFR BLD AUTO: 0.4 % (ref 0–1.5)
BILIRUB SERPL-MCNC: 1.4 MG/DL (ref 0–1.2)
BUN SERPL-MCNC: 13 MG/DL (ref 6–20)
BUN/CREAT SERPL: 11.8 (ref 7–25)
CALCIUM SPEC-SCNC: 10 MG/DL (ref 8.6–10.5)
CHLORIDE SERPL-SCNC: 104 MMOL/L (ref 98–107)
CO2 SERPL-SCNC: 26 MMOL/L (ref 22–29)
CREAT SERPL-MCNC: 1.1 MG/DL (ref 0.76–1.27)
DEPRECATED RDW RBC AUTO: 42.1 FL (ref 37–54)
EGFRCR SERPLBLD CKD-EPI 2021: 82.8 ML/MIN/1.73
EOSINOPHIL # BLD AUTO: 0.27 10*3/MM3 (ref 0–0.4)
EOSINOPHIL NFR BLD AUTO: 3.6 % (ref 0.3–6.2)
ERYTHROCYTE [DISTWIDTH] IN BLOOD BY AUTOMATED COUNT: 13.1 % (ref 12.3–15.4)
GLOBULIN UR ELPH-MCNC: 2.6 GM/DL
GLUCOSE SERPL-MCNC: 89 MG/DL (ref 65–99)
HCT VFR BLD AUTO: 44.4 % (ref 37.5–51)
HGB BLD-MCNC: 14.9 G/DL (ref 13–17.7)
IMM GRANULOCYTES # BLD AUTO: 0.02 10*3/MM3 (ref 0–0.05)
IMM GRANULOCYTES NFR BLD AUTO: 0.3 % (ref 0–0.5)
LYMPHOCYTES # BLD AUTO: 2.25 10*3/MM3 (ref 0.7–3.1)
LYMPHOCYTES NFR BLD AUTO: 30 % (ref 19.6–45.3)
MCH RBC QN AUTO: 29.7 PG (ref 26.6–33)
MCHC RBC AUTO-ENTMCNC: 33.6 G/DL (ref 31.5–35.7)
MCV RBC AUTO: 88.4 FL (ref 79–97)
MONOCYTES # BLD AUTO: 0.76 10*3/MM3 (ref 0.1–0.9)
MONOCYTES NFR BLD AUTO: 10.1 % (ref 5–12)
NEUTROPHILS NFR BLD AUTO: 4.16 10*3/MM3 (ref 1.7–7)
NEUTROPHILS NFR BLD AUTO: 55.6 % (ref 42.7–76)
NRBC BLD AUTO-RTO: 0 /100 WBC (ref 0–0.2)
PLATELET # BLD AUTO: 258 10*3/MM3 (ref 140–450)
PMV BLD AUTO: 8.5 FL (ref 6–12)
POTASSIUM SERPL-SCNC: 4 MMOL/L (ref 3.5–5.2)
PROT SERPL-MCNC: 7.3 G/DL (ref 6–8.5)
RBC # BLD AUTO: 5.02 10*6/MM3 (ref 4.14–5.8)
SODIUM SERPL-SCNC: 138 MMOL/L (ref 136–145)
WBC NRBC COR # BLD AUTO: 7.49 10*3/MM3 (ref 3.4–10.8)

## 2024-08-19 PROCEDURE — 85025 COMPLETE CBC W/AUTO DIFF WBC: CPT | Performed by: NURSE PRACTITIONER

## 2024-08-19 PROCEDURE — 71046 X-RAY EXAM CHEST 2 VIEWS: CPT

## 2024-08-19 PROCEDURE — 80053 COMPREHEN METABOLIC PANEL: CPT | Performed by: NURSE PRACTITIONER

## 2024-08-19 PROCEDURE — 36415 COLL VENOUS BLD VENIPUNCTURE: CPT | Performed by: NURSE PRACTITIONER

## 2024-08-19 PROCEDURE — 99214 OFFICE O/P EST MOD 30 MIN: CPT | Performed by: NURSE PRACTITIONER

## 2024-08-19 RX ORDER — LOSARTAN POTASSIUM 100 MG/1
100 TABLET ORAL DAILY
Qty: 30 TABLET | Refills: 2 | Status: SHIPPED | OUTPATIENT
Start: 2024-08-19

## 2024-08-19 NOTE — TELEPHONE ENCOUNTER
Called patient to see how many days he has been feeling symptoms, if he has take an covid test yet and would he mind taking one prior to appointment, lvm for patient to call back

## 2024-08-19 NOTE — PROGRESS NOTES
Subjective   Wang Tee is a 49 y.o. male. Patient is here today for   Chief Complaint   Patient presents with    Headache    Dizziness    URI   .    History of Present Illness   C/o headache associated with dizziness, clamminess. Symptoms started about 2 weeks ago. Drank fluids and symptoms improved. Symptoms started again a few days ago.   Patient's BP is elevated. He has been taking his medication (losartan 50 mg) as prescribed.     The following portions of the patient's history were reviewed and updated as appropriate: allergies, current medications, past family history, past medical history, past social history, past surgical history and problem list.    Review of Systems    Objective   Vitals:    08/19/24 1536   BP: (!) 160/110   Pulse: 73   Temp: 98.8 °F (37.1 °C)   SpO2: 98%     Body mass index is 34.71 kg/m².  Physical Exam  Vitals and nursing note reviewed.   Constitutional:       Appearance: Normal appearance. He is well-developed.   HENT:      Right Ear: Tympanic membrane and ear canal normal.      Left Ear: Tympanic membrane and ear canal normal.      Mouth/Throat:      Pharynx: Oropharynx is clear.   Cardiovascular:      Rate and Rhythm: Normal rate and regular rhythm.      Heart sounds: Normal heart sounds.   Pulmonary:      Effort: Pulmonary effort is normal.      Breath sounds: Examination of the left-lower field reveals wheezing.   Skin:     General: Skin is warm and dry.   Neurological:      Mental Status: He is alert and oriented to person, place, and time.   Psychiatric:         Speech: Speech normal.         Behavior: Behavior normal.         Thought Content: Thought content normal.       Assessment & Plan   Diagnoses and all orders for this visit:    1. Primary hypertension (Primary)  -     losartan (COZAAR) 100 MG tablet; Take 1 tablet by mouth Daily.  Dispense: 30 tablet; Refill: 2  -     Comprehensive Metabolic Panel    2. Wheezing  -     XR Chest PA & Lateral; Future  -      Comprehensive Metabolic Panel  -     CBC Auto Differential      HTN - increase losartan to 100 mg daily. F/u in 1 month    Wheezing - will check a chest x-ray and labs. Will treat based on results.

## 2024-09-19 ENCOUNTER — OFFICE VISIT (OUTPATIENT)
Dept: INTERNAL MEDICINE | Facility: CLINIC | Age: 49
End: 2024-09-19
Payer: COMMERCIAL

## 2024-09-19 VITALS
HEIGHT: 72 IN | SYSTOLIC BLOOD PRESSURE: 136 MMHG | HEART RATE: 86 BPM | DIASTOLIC BLOOD PRESSURE: 84 MMHG | WEIGHT: 261.7 LBS | BODY MASS INDEX: 35.45 KG/M2 | OXYGEN SATURATION: 98 % | TEMPERATURE: 99.2 F

## 2024-09-19 DIAGNOSIS — I10 PRIMARY HYPERTENSION: ICD-10-CM

## 2024-09-19 DIAGNOSIS — Z00.00 HEALTHCARE MAINTENANCE: Primary | ICD-10-CM

## 2024-09-19 DIAGNOSIS — E55.9 VITAMIN D DEFICIENCY: ICD-10-CM

## 2024-09-19 DIAGNOSIS — M1A.0790 CHRONIC GOUT OF FOOT, UNSPECIFIED CAUSE, UNSPECIFIED LATERALITY: ICD-10-CM

## 2024-09-19 DIAGNOSIS — I10 PRIMARY HYPERTENSION: Primary | ICD-10-CM

## 2024-09-19 DIAGNOSIS — R53.83 OTHER FATIGUE: ICD-10-CM

## 2024-09-19 PROCEDURE — 99214 OFFICE O/P EST MOD 30 MIN: CPT | Performed by: NURSE PRACTITIONER

## 2024-09-19 RX ORDER — LOSARTAN POTASSIUM 100 MG/1
100 TABLET ORAL DAILY
Qty: 90 TABLET | Refills: 2 | Status: SHIPPED | OUTPATIENT
Start: 2024-09-19

## 2024-10-15 ENCOUNTER — TELEPHONE (OUTPATIENT)
Dept: INTERNAL MEDICINE | Facility: CLINIC | Age: 49
End: 2024-10-15
Payer: COMMERCIAL

## 2024-10-15 NOTE — TELEPHONE ENCOUNTER
Wang called in saying he believes he has been taking 200mg of losartan daily since new script of 100mg sent in, has been feeling extremely fatigue. He did not pay attention to label and thought he was supposed to be taking two tablets daily still. Patient actaully picked up losartan on 8/19/24 for 90 days, 100mg tablets, patient says he has one left . I did try to call patient back and see how many tablets he has and update him and let him know he indeed picked up 100mg tablet at 90 day supply. Patient did say he has been very fatigue, please advise.

## 2024-11-03 ENCOUNTER — READMISSION MANAGEMENT (OUTPATIENT)
Dept: CALL CENTER | Facility: HOSPITAL | Age: 49
End: 2024-11-03
Payer: COMMERCIAL

## 2024-11-03 ENCOUNTER — HOSPITAL ENCOUNTER (OUTPATIENT)
Facility: HOSPITAL | Age: 49
Discharge: HOME | End: 2024-11-03
Attending: EMERGENCY MEDICINE | Admitting: STUDENT IN AN ORGANIZED HEALTH CARE EDUCATION/TRAINING PROGRAM
Payer: COMMERCIAL

## 2024-11-03 ENCOUNTER — APPOINTMENT (OUTPATIENT)
Dept: GENERAL RADIOLOGY | Facility: HOSPITAL | Age: 49
End: 2024-11-03
Payer: COMMERCIAL

## 2024-11-03 VITALS
BODY MASS INDEX: 38.39 KG/M2 | OXYGEN SATURATION: 94 % | HEART RATE: 76 BPM | WEIGHT: 253.31 LBS | TEMPERATURE: 98.5 F | SYSTOLIC BLOOD PRESSURE: 146 MMHG | DIASTOLIC BLOOD PRESSURE: 97 MMHG | RESPIRATION RATE: 18 BRPM | HEIGHT: 68 IN

## 2024-11-03 DIAGNOSIS — R07.9 CHEST PAIN, UNSPECIFIED TYPE: Primary | ICD-10-CM

## 2024-11-03 DIAGNOSIS — R06.00 DYSPNEA, UNSPECIFIED TYPE: ICD-10-CM

## 2024-11-03 PROBLEM — E66.9 OBESITY (BMI 30-39.9): Status: ACTIVE | Noted: 2024-11-03

## 2024-11-03 PROBLEM — I10 ESSENTIAL HYPERTENSION: Status: ACTIVE | Noted: 2024-11-03

## 2024-11-03 PROBLEM — Z93.0 TRACHEOSTOMY STATUS: Status: ACTIVE | Noted: 2024-11-03

## 2024-11-03 PROBLEM — Z86.79 HISTORY OF PERICARDITIS: Status: ACTIVE | Noted: 2024-11-03

## 2024-11-03 LAB
ALBUMIN SERPL-MCNC: 4.2 G/DL (ref 3.5–5.2)
ALBUMIN/GLOB SERPL: 1.4 G/DL
ALP SERPL-CCNC: 77 U/L (ref 39–117)
ALT SERPL W P-5'-P-CCNC: 51 U/L (ref 1–41)
ANION GAP SERPL CALCULATED.3IONS-SCNC: 8 MMOL/L (ref 5–15)
ANION GAP SERPL CALCULATED.3IONS-SCNC: 9 MMOL/L (ref 5–15)
AST SERPL-CCNC: 28 U/L (ref 1–40)
BASOPHILS # BLD AUTO: 0.05 10*3/MM3 (ref 0–0.2)
BASOPHILS NFR BLD AUTO: 0.7 % (ref 0–1.5)
BILIRUB SERPL-MCNC: 1 MG/DL (ref 0–1.2)
BUN SERPL-MCNC: 13 MG/DL (ref 6–20)
BUN SERPL-MCNC: 13 MG/DL (ref 6–20)
BUN/CREAT SERPL: 11.8 (ref 7–25)
BUN/CREAT SERPL: 11.9 (ref 7–25)
CALCIUM SPEC-SCNC: 9.3 MG/DL (ref 8.6–10.5)
CALCIUM SPEC-SCNC: 9.7 MG/DL (ref 8.6–10.5)
CHLORIDE SERPL-SCNC: 102 MMOL/L (ref 98–107)
CHLORIDE SERPL-SCNC: 104 MMOL/L (ref 98–107)
CO2 SERPL-SCNC: 24 MMOL/L (ref 22–29)
CO2 SERPL-SCNC: 26 MMOL/L (ref 22–29)
CREAT SERPL-MCNC: 1.09 MG/DL (ref 0.76–1.27)
CREAT SERPL-MCNC: 1.1 MG/DL (ref 0.76–1.27)
CRP SERPL-MCNC: 0.4 MG/DL (ref 0–0.5)
D DIMER PPP FEU-MCNC: <0.27 MCGFEU/ML (ref 0–0.5)
DEPRECATED RDW RBC AUTO: 42 FL (ref 37–54)
DEPRECATED RDW RBC AUTO: 42.6 FL (ref 37–54)
EGFRCR SERPLBLD CKD-EPI 2021: 82.8 ML/MIN/1.73
EGFRCR SERPLBLD CKD-EPI 2021: 83.7 ML/MIN/1.73
EOSINOPHIL # BLD AUTO: 0.27 10*3/MM3 (ref 0–0.4)
EOSINOPHIL NFR BLD AUTO: 3.5 % (ref 0.3–6.2)
ERYTHROCYTE [DISTWIDTH] IN BLOOD BY AUTOMATED COUNT: 12.9 % (ref 12.3–15.4)
ERYTHROCYTE [DISTWIDTH] IN BLOOD BY AUTOMATED COUNT: 13.1 % (ref 12.3–15.4)
ERYTHROCYTE [SEDIMENTATION RATE] IN BLOOD: 2 MM/HR (ref 0–15)
GEN 5 2HR TROPONIN T REFLEX: <6 NG/L
GLOBULIN UR ELPH-MCNC: 3 GM/DL
GLUCOSE SERPL-MCNC: 94 MG/DL (ref 65–99)
GLUCOSE SERPL-MCNC: 98 MG/DL (ref 65–99)
HCT VFR BLD AUTO: 41 % (ref 37.5–51)
HCT VFR BLD AUTO: 42.3 % (ref 37.5–51)
HGB BLD-MCNC: 14 G/DL (ref 13–17.7)
HGB BLD-MCNC: 14.5 G/DL (ref 13–17.7)
HOLD SPECIMEN: NORMAL
HOLD SPECIMEN: NORMAL
IMM GRANULOCYTES # BLD AUTO: 0.03 10*3/MM3 (ref 0–0.05)
IMM GRANULOCYTES NFR BLD AUTO: 0.4 % (ref 0–0.5)
LYMPHOCYTES # BLD AUTO: 1.76 10*3/MM3 (ref 0.7–3.1)
LYMPHOCYTES NFR BLD AUTO: 23 % (ref 19.6–45.3)
MAGNESIUM SERPL-MCNC: 2.1 MG/DL (ref 1.6–2.6)
MCH RBC QN AUTO: 30.4 PG (ref 26.6–33)
MCH RBC QN AUTO: 30.5 PG (ref 26.6–33)
MCHC RBC AUTO-ENTMCNC: 34.1 G/DL (ref 31.5–35.7)
MCHC RBC AUTO-ENTMCNC: 34.3 G/DL (ref 31.5–35.7)
MCV RBC AUTO: 89.1 FL (ref 79–97)
MCV RBC AUTO: 89.1 FL (ref 79–97)
MONOCYTES # BLD AUTO: 0.66 10*3/MM3 (ref 0.1–0.9)
MONOCYTES NFR BLD AUTO: 8.6 % (ref 5–12)
NEUTROPHILS NFR BLD AUTO: 4.89 10*3/MM3 (ref 1.7–7)
NEUTROPHILS NFR BLD AUTO: 63.8 % (ref 42.7–76)
NRBC BLD AUTO-RTO: 0 /100 WBC (ref 0–0.2)
NT-PROBNP SERPL-MCNC: 37.7 PG/ML (ref 0–450)
PLATELET # BLD AUTO: 254 10*3/MM3 (ref 140–450)
PLATELET # BLD AUTO: 270 10*3/MM3 (ref 140–450)
PMV BLD AUTO: 8.4 FL (ref 6–12)
PMV BLD AUTO: 8.6 FL (ref 6–12)
POTASSIUM SERPL-SCNC: 3.8 MMOL/L (ref 3.5–5.2)
POTASSIUM SERPL-SCNC: 4 MMOL/L (ref 3.5–5.2)
PROCALCITONIN SERPL-MCNC: 0.14 NG/ML (ref 0–0.25)
PROT SERPL-MCNC: 7.2 G/DL (ref 6–8.5)
QT INTERVAL: 406 MS
QT INTERVAL: 411 MS
QTC INTERVAL: 442 MS
QTC INTERVAL: 453 MS
RBC # BLD AUTO: 4.6 10*6/MM3 (ref 4.14–5.8)
RBC # BLD AUTO: 4.75 10*6/MM3 (ref 4.14–5.8)
SODIUM SERPL-SCNC: 136 MMOL/L (ref 136–145)
SODIUM SERPL-SCNC: 137 MMOL/L (ref 136–145)
TROPONIN T DELTA: NORMAL
TROPONIN T SERPL HS-MCNC: <6 NG/L
WBC NRBC COR # BLD AUTO: 7.66 10*3/MM3 (ref 3.4–10.8)
WBC NRBC COR # BLD AUTO: 7.72 10*3/MM3 (ref 3.4–10.8)
WHOLE BLOOD HOLD COAG: NORMAL
WHOLE BLOOD HOLD SPECIMEN: NORMAL

## 2024-11-03 PROCEDURE — 80053 COMPREHEN METABOLIC PANEL: CPT | Performed by: EMERGENCY MEDICINE

## 2024-11-03 PROCEDURE — 93010 ELECTROCARDIOGRAM REPORT: CPT | Performed by: INTERNAL MEDICINE

## 2024-11-03 PROCEDURE — G0378 HOSPITAL OBSERVATION PER HR: HCPCS

## 2024-11-03 PROCEDURE — 94799 UNLISTED PULMONARY SVC/PX: CPT

## 2024-11-03 PROCEDURE — 36415 COLL VENOUS BLD VENIPUNCTURE: CPT | Performed by: NURSE PRACTITIONER

## 2024-11-03 PROCEDURE — 84484 ASSAY OF TROPONIN QUANT: CPT | Performed by: NURSE PRACTITIONER

## 2024-11-03 PROCEDURE — 99285 EMERGENCY DEPT VISIT HI MDM: CPT

## 2024-11-03 PROCEDURE — 71045 X-RAY EXAM CHEST 1 VIEW: CPT

## 2024-11-03 PROCEDURE — 85027 COMPLETE CBC AUTOMATED: CPT | Performed by: NURSE PRACTITIONER

## 2024-11-03 PROCEDURE — 93005 ELECTROCARDIOGRAM TRACING: CPT | Performed by: EMERGENCY MEDICINE

## 2024-11-03 PROCEDURE — 85025 COMPLETE CBC W/AUTO DIFF WBC: CPT | Performed by: EMERGENCY MEDICINE

## 2024-11-03 PROCEDURE — 93005 ELECTROCARDIOGRAM TRACING: CPT | Performed by: NURSE PRACTITIONER

## 2024-11-03 PROCEDURE — 85379 FIBRIN DEGRADATION QUANT: CPT | Performed by: EMERGENCY MEDICINE

## 2024-11-03 PROCEDURE — 84484 ASSAY OF TROPONIN QUANT: CPT | Performed by: EMERGENCY MEDICINE

## 2024-11-03 PROCEDURE — 85652 RBC SED RATE AUTOMATED: CPT | Performed by: INTERNAL MEDICINE

## 2024-11-03 PROCEDURE — 83735 ASSAY OF MAGNESIUM: CPT | Performed by: EMERGENCY MEDICINE

## 2024-11-03 PROCEDURE — 99214 OFFICE O/P EST MOD 30 MIN: CPT | Performed by: INTERNAL MEDICINE

## 2024-11-03 PROCEDURE — 83880 ASSAY OF NATRIURETIC PEPTIDE: CPT | Performed by: EMERGENCY MEDICINE

## 2024-11-03 PROCEDURE — 84145 PROCALCITONIN (PCT): CPT | Performed by: EMERGENCY MEDICINE

## 2024-11-03 PROCEDURE — 86140 C-REACTIVE PROTEIN: CPT | Performed by: INTERNAL MEDICINE

## 2024-11-03 PROCEDURE — 94640 AIRWAY INHALATION TREATMENT: CPT

## 2024-11-03 RX ORDER — POLYETHYLENE GLYCOL 3350 17 G/17G
17 POWDER, FOR SOLUTION ORAL DAILY PRN
Status: DISCONTINUED | OUTPATIENT
Start: 2024-11-03 | End: 2024-11-03 | Stop reason: HOSPADM

## 2024-11-03 RX ORDER — NITROGLYCERIN 0.4 MG/1
0.4 TABLET SUBLINGUAL
Status: DISCONTINUED | OUTPATIENT
Start: 2024-11-03 | End: 2024-11-03 | Stop reason: HOSPADM

## 2024-11-03 RX ORDER — COLCHICINE 0.6 MG/1
0.6 TABLET ORAL 2 TIMES DAILY
Qty: 28 TABLET | Refills: 0 | Status: SHIPPED | OUTPATIENT
Start: 2024-11-03 | End: 2024-11-17

## 2024-11-03 RX ORDER — ASPIRIN 325 MG
650 TABLET, DELAYED RELEASE (ENTERIC COATED) ORAL EVERY 8 HOURS
Qty: 84 TABLET | Refills: 0 | Status: SHIPPED | OUTPATIENT
Start: 2024-11-03 | End: 2024-11-17

## 2024-11-03 RX ORDER — ONDANSETRON 2 MG/ML
4 INJECTION INTRAMUSCULAR; INTRAVENOUS EVERY 6 HOURS PRN
Status: DISCONTINUED | OUTPATIENT
Start: 2024-11-03 | End: 2024-11-03 | Stop reason: HOSPADM

## 2024-11-03 RX ORDER — SODIUM CHLORIDE 9 MG/ML
40 INJECTION, SOLUTION INTRAVENOUS AS NEEDED
Status: DISCONTINUED | OUTPATIENT
Start: 2024-11-03 | End: 2024-11-03 | Stop reason: HOSPADM

## 2024-11-03 RX ORDER — ALLOPURINOL 300 MG/1
300 TABLET ORAL DAILY
Status: DISCONTINUED | OUTPATIENT
Start: 2024-11-03 | End: 2024-11-03 | Stop reason: HOSPADM

## 2024-11-03 RX ORDER — IPRATROPIUM BROMIDE AND ALBUTEROL SULFATE 2.5; .5 MG/3ML; MG/3ML
3 SOLUTION RESPIRATORY (INHALATION) ONCE
Status: COMPLETED | OUTPATIENT
Start: 2024-11-03 | End: 2024-11-03

## 2024-11-03 RX ORDER — ACETAMINOPHEN 325 MG/1
650 TABLET ORAL EVERY 4 HOURS PRN
Status: DISCONTINUED | OUTPATIENT
Start: 2024-11-03 | End: 2024-11-03 | Stop reason: HOSPADM

## 2024-11-03 RX ORDER — LITHIUM CARBONATE 450 MG
450 TABLET, EXTENDED RELEASE ORAL NIGHTLY
Status: DISCONTINUED | OUTPATIENT
Start: 2024-11-03 | End: 2024-11-03 | Stop reason: HOSPADM

## 2024-11-03 RX ORDER — SODIUM CHLORIDE 0.9 % (FLUSH) 0.9 %
10 SYRINGE (ML) INJECTION AS NEEDED
Status: DISCONTINUED | OUTPATIENT
Start: 2024-11-03 | End: 2024-11-03 | Stop reason: HOSPADM

## 2024-11-03 RX ORDER — ACETAMINOPHEN 160 MG/5ML
650 SOLUTION ORAL EVERY 4 HOURS PRN
Status: DISCONTINUED | OUTPATIENT
Start: 2024-11-03 | End: 2024-11-03 | Stop reason: HOSPADM

## 2024-11-03 RX ORDER — LOSARTAN POTASSIUM 100 MG/1
100 TABLET ORAL DAILY
Status: DISCONTINUED | OUTPATIENT
Start: 2024-11-04 | End: 2024-11-03 | Stop reason: HOSPADM

## 2024-11-03 RX ORDER — CALCIUM CARBONATE 500 MG/1
2 TABLET, CHEWABLE ORAL 2 TIMES DAILY PRN
Status: DISCONTINUED | OUTPATIENT
Start: 2024-11-03 | End: 2024-11-03 | Stop reason: HOSPADM

## 2024-11-03 RX ORDER — AMOXICILLIN 250 MG
2 CAPSULE ORAL 2 TIMES DAILY PRN
Status: DISCONTINUED | OUTPATIENT
Start: 2024-11-03 | End: 2024-11-03 | Stop reason: HOSPADM

## 2024-11-03 RX ORDER — SODIUM CHLORIDE 0.9 % (FLUSH) 0.9 %
10 SYRINGE (ML) INJECTION EVERY 12 HOURS SCHEDULED
Status: DISCONTINUED | OUTPATIENT
Start: 2024-11-03 | End: 2024-11-03 | Stop reason: HOSPADM

## 2024-11-03 RX ORDER — BISACODYL 5 MG/1
5 TABLET, DELAYED RELEASE ORAL DAILY PRN
Status: DISCONTINUED | OUTPATIENT
Start: 2024-11-03 | End: 2024-11-03 | Stop reason: HOSPADM

## 2024-11-03 RX ORDER — BISACODYL 10 MG
10 SUPPOSITORY, RECTAL RECTAL DAILY PRN
Status: DISCONTINUED | OUTPATIENT
Start: 2024-11-03 | End: 2024-11-03 | Stop reason: HOSPADM

## 2024-11-03 RX ORDER — ONDANSETRON 4 MG/1
4 TABLET, ORALLY DISINTEGRATING ORAL EVERY 6 HOURS PRN
Status: DISCONTINUED | OUTPATIENT
Start: 2024-11-03 | End: 2024-11-03 | Stop reason: HOSPADM

## 2024-11-03 RX ORDER — CLONAZEPAM 0.5 MG/1
0.5 TABLET ORAL NIGHTLY PRN
Status: DISCONTINUED | OUTPATIENT
Start: 2024-11-03 | End: 2024-11-03 | Stop reason: HOSPADM

## 2024-11-03 RX ORDER — LITHIUM CARBONATE 300 MG/1
600 CAPSULE ORAL NIGHTLY
Status: DISCONTINUED | OUTPATIENT
Start: 2024-11-03 | End: 2024-11-03 | Stop reason: HOSPADM

## 2024-11-03 RX ORDER — ACETAMINOPHEN 650 MG/1
650 SUPPOSITORY RECTAL EVERY 4 HOURS PRN
Status: DISCONTINUED | OUTPATIENT
Start: 2024-11-03 | End: 2024-11-03 | Stop reason: HOSPADM

## 2024-11-03 RX ADMIN — Medication 10 ML: at 09:01

## 2024-11-03 RX ADMIN — IPRATROPIUM BROMIDE AND ALBUTEROL SULFATE 3 ML: .5; 3 SOLUTION RESPIRATORY (INHALATION) at 02:50

## 2024-11-03 NOTE — ED PROVIDER NOTES
EMERGENCY DEPARTMENT ENCOUNTER    Room Number:  E562/1  PCP: Julia Knapp APRN  Patient Care Team:  Julia Knapp APRN as PCP - General (Family Medicine)   Independent Historians: Patient    HPI:  Chief Complaint: Dyspnea    A complete HPI/ROS/PMH/PSH/SH/FH are unobtainable due to: None    Chronic or social conditions impacting patient care (Social Determinants of Health): None  (Financial Resource Strain / Food Insecurity / Transportation Needs / Physical Activity / Stress / Social Connections / Intimate Partner Violence / Housing Stability)    Context: Wang Tee is a 49 y.o. male who presents to the ED c/o acute dyspnea which woke him from sleep this morning.  Patient has history of tracheostomy.  Denies any chest pain no fever chills.  Says very minimal cough that he feels like he may have a little bit of congestion that is trach.  No fevers or chills.  Patient not feeling ill before has not had similar symptoms in the past.    Review of prior external notes (non-ED) -and- Review of prior external test results outside of this encounter: I reviewed patient's primary care note from 8/19/2024    Prescription drug monitoring program review:         PAST MEDICAL HISTORY  Active Ambulatory Problems     Diagnosis Date Noted    Abnormal liver enzymes 05/03/2016    Primary hypertriglyceridemia 05/03/2016    Gout 05/03/2016    Adiposity 05/03/2016    Bipolar I disorder 05/03/2016    Vitamin D deficiency 08/23/2016    IFG (impaired fasting glucose) 08/23/2016    Chest pain 11/05/2021    Acute pericarditis 11/24/2021    Focal dystonia 07/05/2018     Resolved Ambulatory Problems     Diagnosis Date Noted    No Resolved Ambulatory Problems     Past Medical History:   Diagnosis Date    Abnormal serum thyroxine (T4) level     Bipolar affective     Depression 1992    Disorder of vocal cord     Elevated liver enzymes     History of medical problems 1992    Hypertension          PAST SURGICAL HISTORY  Past Surgical  History:   Procedure Laterality Date    TRACHEOSTOMY           FAMILY HISTORY  Family History   Problem Relation Age of Onset    Hypertension Mother     Arthritis Mother     Diabetes Mother     Kidney disease Mother     Hypertension Father     Anxiety disorder Other     Bipolar disorder Other          SOCIAL HISTORY  Social History     Socioeconomic History    Marital status: Single   Tobacco Use    Smoking status: Never    Smokeless tobacco: Never   Vaping Use    Vaping status: Never Used   Substance and Sexual Activity    Alcohol use: Not Currently     Comment: social    Drug use: No    Sexual activity: Not Currently     Partners: Male         ALLERGIES  Quetiapine, Haloperidol, Amlodipine-valsartan-hctz, Aripiprazole, Olanzapine, Risperidone, Sulfa antibiotics, Sulfamethoxazole-trimethoprim, Valproic acid, Amlodipine, and Bupropion        REVIEW OF SYSTEMS  Review of Systems  Included in HPI  All systems reviewed and negative except for those discussed in HPI.      PHYSICAL EXAM    I have reviewed the triage vital signs and nursing notes.    ED Triage Vitals [11/03/24 0146]   Temp Heart Rate Resp BP SpO2   98 °F (36.7 °C) 88 20 -- 99 %      Temp src Heart Rate Source Patient Position BP Location FiO2 (%)   Tympanic Monitor -- -- --       Physical Exam  GENERAL: alert, no acute distress  SKIN: Warm, dry  HENT: Normocephalic, atraumatic, tracheostomy hardware in place appreciate any significant secretions or signs of infection or irritation EYES: no scleral icterus  CV: regular rhythm, regular rate  RESPIRATORY: normal effort, lungs clear  ABDOMEN: soft, nontender, nondistended  MUSCULOSKELETAL: no deformity  NEURO: alert, moves all extremities, follows commands                                                               LAB RESULTS  Recent Results (from the past 24 hours)   Comprehensive Metabolic Panel    Collection Time: 11/03/24  1:53 AM    Specimen: Blood   Result Value Ref Range    Glucose 98 65 - 99 mg/dL     BUN 13 6 - 20 mg/dL    Creatinine 1.10 0.76 - 1.27 mg/dL    Sodium 136 136 - 145 mmol/L    Potassium 3.8 3.5 - 5.2 mmol/L    Chloride 102 98 - 107 mmol/L    CO2 26.0 22.0 - 29.0 mmol/L    Calcium 9.7 8.6 - 10.5 mg/dL    Total Protein 7.2 6.0 - 8.5 g/dL    Albumin 4.2 3.5 - 5.2 g/dL    ALT (SGPT) 51 (H) 1 - 41 U/L    AST (SGOT) 28 1 - 40 U/L    Alkaline Phosphatase 77 39 - 117 U/L    Total Bilirubin 1.0 0.0 - 1.2 mg/dL    Globulin 3.0 gm/dL    A/G Ratio 1.4 g/dL    BUN/Creatinine Ratio 11.8 7.0 - 25.0    Anion Gap 8.0 5.0 - 15.0 mmol/L    eGFR 82.8 >60.0 mL/min/1.73   BNP    Collection Time: 11/03/24  1:53 AM    Specimen: Blood   Result Value Ref Range    proBNP 37.7 0.0 - 450.0 pg/mL   Single High Sensitivity Troponin T    Collection Time: 11/03/24  1:53 AM    Specimen: Blood   Result Value Ref Range    HS Troponin T <6 <22 ng/L   Green Top (Gel)    Collection Time: 11/03/24  1:53 AM   Result Value Ref Range    Extra Tube Hold for add-ons.    Lavender Top    Collection Time: 11/03/24  1:53 AM   Result Value Ref Range    Extra Tube hold for add-on    Gold Top - SST    Collection Time: 11/03/24  1:53 AM   Result Value Ref Range    Extra Tube Hold for add-ons.    Light Blue Top    Collection Time: 11/03/24  1:53 AM   Result Value Ref Range    Extra Tube Hold for add-ons.    CBC Auto Differential    Collection Time: 11/03/24  1:53 AM    Specimen: Blood   Result Value Ref Range    WBC 7.66 3.40 - 10.80 10*3/mm3    RBC 4.75 4.14 - 5.80 10*6/mm3    Hemoglobin 14.5 13.0 - 17.7 g/dL    Hematocrit 42.3 37.5 - 51.0 %    MCV 89.1 79.0 - 97.0 fL    MCH 30.5 26.6 - 33.0 pg    MCHC 34.3 31.5 - 35.7 g/dL    RDW 12.9 12.3 - 15.4 %    RDW-SD 42.0 37.0 - 54.0 fl    MPV 8.6 6.0 - 12.0 fL    Platelets 270 140 - 450 10*3/mm3    Neutrophil % 63.8 42.7 - 76.0 %    Lymphocyte % 23.0 19.6 - 45.3 %    Monocyte % 8.6 5.0 - 12.0 %    Eosinophil % 3.5 0.3 - 6.2 %    Basophil % 0.7 0.0 - 1.5 %    Immature Grans % 0.4 0.0 - 0.5 %     Neutrophils, Absolute 4.89 1.70 - 7.00 10*3/mm3    Lymphocytes, Absolute 1.76 0.70 - 3.10 10*3/mm3    Monocytes, Absolute 0.66 0.10 - 0.90 10*3/mm3    Eosinophils, Absolute 0.27 0.00 - 0.40 10*3/mm3    Basophils, Absolute 0.05 0.00 - 0.20 10*3/mm3    Immature Grans, Absolute 0.03 0.00 - 0.05 10*3/mm3    nRBC 0.0 0.0 - 0.2 /100 WBC   D-dimer, Quantitative    Collection Time: 11/03/24  1:53 AM    Specimen: Blood   Result Value Ref Range    D-Dimer, Quantitative <0.27 0.00 - 0.50 MCGFEU/mL   Procalcitonin    Collection Time: 11/03/24  1:54 AM    Specimen: Blood   Result Value Ref Range    Procalcitonin 0.14 0.00 - 0.25 ng/mL   Magnesium    Collection Time: 11/03/24  1:54 AM    Specimen: Blood   Result Value Ref Range    Magnesium 2.1 1.6 - 2.6 mg/dL   ECG 12 Lead ED Triage Standing Order; SOA    Collection Time: 11/03/24  2:07 AM   Result Value Ref Range    QT Interval 406 ms    QTC Interval 442 ms   Single High Sensitivity Troponin T    Collection Time: 11/03/24  4:47 AM    Specimen: Blood   Result Value Ref Range    HS Troponin T <6 <22 ng/L       I ordered the above labs and independently reviewed the results.        RADIOLOGY  XR Chest 1 View    Result Date: 11/3/2024  Patient: FREYA ENNIS  Time Out: 05:30 Exam(s): XR CXR 1 VIEW EXAM:   XR Chest, 1 View CLINICAL HISTORY:    Reason for exam: SOA Triage Protocol. TECHNIQUE:   Frontal view of the chest. COMPARISON:   No relevant prior studies available. FINDINGS:   Lungs:  No consolidation.   Pleural space:  No pleural effusion or pneumothorax.   Heart:  No cardiomegaly.   Tubes, lines and devices:  Tracheostomy. IMPRESSION:       No acute cardiopulmonary abnormality.     Electronically signed by Anuj William MD on 11-03-24 at 0530     I ordered the above noted radiological studies. Reviewed by me and discussed with radiologist.  See dictation for official radiology interpretation.      PROCEDURES    Procedures      MEDICATIONS GIVEN IN ER    Medications   sodium  chloride 0.9 % flush 10 mL (has no administration in time range)   sodium chloride 0.9 % flush 10 mL (has no administration in time range)   sodium chloride 0.9 % flush 10 mL (has no administration in time range)   sodium chloride 0.9 % infusion 40 mL (has no administration in time range)   nitroglycerin (NITROSTAT) SL tablet 0.4 mg (has no administration in time range)   acetaminophen (TYLENOL) tablet 650 mg (has no administration in time range)     Or   acetaminophen (TYLENOL) 160 MG/5ML oral solution 650 mg (has no administration in time range)     Or   acetaminophen (TYLENOL) suppository 650 mg (has no administration in time range)   sennosides-docusate (PERICOLACE) 8.6-50 MG per tablet 2 tablet (has no administration in time range)     And   polyethylene glycol (MIRALAX) packet 17 g (has no administration in time range)     And   bisacodyl (DULCOLAX) EC tablet 5 mg (has no administration in time range)     And   bisacodyl (DULCOLAX) suppository 10 mg (has no administration in time range)   ondansetron ODT (ZOFRAN-ODT) disintegrating tablet 4 mg (has no administration in time range)     Or   ondansetron (ZOFRAN) injection 4 mg (has no administration in time range)   calcium carbonate (TUMS) chewable tablet 500 mg (200 mg elemental) (has no administration in time range)   ipratropium-albuterol (DUO-NEB) nebulizer solution 3 mL (3 mL Nebulization Given 11/3/24 0250)         ORDERS PLACED DURING THIS VISIT:  Orders Placed This Encounter   Procedures    Respiratory Panel PCR w/COVID-19(SARS-CoV-2) KASH/ARTUR/FRANCISCA/PAD/COR/SWATHI In-House, NP Swab in UTM/VTM, 2 HR TAT - Swab, Nasopharynx    XR Chest 1 View    [place] Draw    Comprehensive Metabolic Panel    BNP    Single High Sensitivity Troponin T    CBC Auto Differential    Procalcitonin    Magnesium    D-dimer, Quantitative    Single High Sensitivity Troponin T    Basic Metabolic Panel    CBC (No Diff)    High Sensitivity Troponin T    NPO Diet NPO Type: Strict NPO     Undress & Gown    Continuous Pulse Oximetry    Vital Signs    Suction Tracheostomy    Vital Signs    Intake & Output    Weigh Patient    Oral Care    Place Sequential Compression Device    Maintain Sequential Compression Device    Maintain IV Access    Telemetry - Place Orders & Notify Provider of Results When Patient Experiences Acute Chest Pain, Dysrhythmia or Respiratory Distress    May Be Off Telemetry for Tests    Code Status and Medical Interventions: CPR (Attempt to Resuscitate); Full Support    [facility] (on-call MD unless specified) Details    Inpatient Cardiology Consult    Oxygen Therapy- Nasal Cannula; Titrate 1-6 LPM Per SpO2; 90 - 95%    ECG 12 Lead ED Triage Standing Order; SOA    ECG 12 Lead Chest Pain    Insert Peripheral IV    Insert Peripheral IV    Initiate Observation Status    CBC & Differential    Green Top (Gel)    Lavender Top    Gold Top - SST    Light Blue Top         PROGRESS, DATA ANALYSIS, CONSULTS, AND MEDICAL DECISION MAKING    All labs have been independently interpreted by me.  All radiology studies have been reviewed by me and discussed with radiologist dictating the report.   EKG's independently viewed and interpreted by me.  Discussion below represents my analysis of pertinent findings related to patient's condition, differential diagnosis, treatment plan and final disposition.    Differential diagnosis includes but is not limited to:  Asthma, COPD, pneumonia, pulmonary embolus, acute respiratory distress syndrome, pneumothorax, pleural effusion, pulmonary fibrosis, congestive heart failure, myocardial infarction, DKA, uremia, acidosis, sepsis, anemia, drug related, hyperventilation, CNS disease .    Clinical Scores: HEART Score: 3              ED Course as of 11/03/24 0655   Sun Nov 03, 2024   0416 EKG          EKG time: 0207  Rhythm/Rate: Sinus rhythm rate 71  P waves and WA: Normal  QRS, axis: Narrow regular  ST and T waves: ST ovation consistent with pericarditis -seen on prior  EKG.    Interpreted Contemporaneously by me, independently viewed [TJ]      ED Course User Index  [TJ] Gerardo Awan MD               PPE: The patient wore a mask and I wore an N95 mask throughout the entire patient encounter.       AS OF 06:55 EST VITALS:    BP - 153/96  HR - 84  TEMP - 98 °F (36.7 °C) (Tympanic)  O2 SATS - 97%        DIAGNOSIS  Final diagnoses:   Chest pain, unspecified type   Dyspnea, unspecified type         DISPOSITION  ED Disposition       ED Disposition   Decision to Admit    Condition   --    Comment   Level of Care: Telemetry [5]   Diagnosis: Chest pain [517163]   Admitting Physician: MARQUIS CLINTON [[id number]]                    Note Disclaimer: At [facility], we believe that sharing information builds trust and better relationships. You are receiving this note because you recently visited [facility]. It is possible you will see health information before a provider has talked with you about it. This kind of information can be easy to misunderstand. To help you fully understand what it means for your health, we urge you to discuss this note with your provider.         Gerardo Awan MD  11/03/24 0655

## 2024-11-03 NOTE — H&P
"    Patient Name:  Wang Tee  YOB: 1975  MRN:  [id number]  Admit Date:  11/3/2024  Patient Care Team:  Julia Knapp APRN as PCP - General (Family Medicine)      Subjective   History Present Illness     Chief Complaint   Patient presents with    Shortness of Breath       Mr. Tee is a 49 y.o. 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 Symptoms abrupt in nature at time of onset, described as a feeling of \"pressure\", constant in nature since onset. Symptoms worsened with inspiration, states that he has had symptoms similar to this in the past when he was diagnosed with pericarditis. He endorses associated intermittent dyspnea. EKG showing evidence of ST changes, troponin/proBNP/d-dimer normal. CXR negative for acute findings. Cardiology being consulted and he is being admitted for further evaluation and management.     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    Elevated liver enzymes     Gout     Gout     History of medical problems 1992    Bipolar    Hypertension      Past Surgical History:   Procedure Laterality Date    TRACHEOSTOMY       Family History   Problem Relation Age of Onset    Hypertension Mother     Arthritis Mother     Diabetes Mother     Kidney disease Mother     Hypertension Father     Anxiety disorder Other     Bipolar disorder Other      Social History     Tobacco Use    Smoking status: Never    Smokeless tobacco: Never   Vaping Use    Vaping status: Never Used   Substance Use Topics    Alcohol use: Not Currently     Comment: social    Drug use: No     No current facility-administered medications on file prior to encounter.     Current Outpatient Medications " on File Prior to Encounter   Medication Sig Dispense Refill    allopurinol (ZYLOPRIM) 300 MG tablet Take 1 tablet by mouth Daily. 90 tablet 3    ascorbic acid (VITAMIN C) 500 MG tablet Take 1 tablet by mouth Daily. (Patient taking differently: Take 1 tablet by mouth Daily As Needed (causes gout pain).)      cholecalciferol (VITAMIN D3) 10 MCG (400 UNIT) tablet Take  by mouth Daily.      co-enzyme Q-10 30 MG capsule Take  by mouth Daily.      cyclobenzaprine (FLEXERIL) 10 MG tablet Take 1 tablet by mouth 3 (Three) Times a Day As Needed. for muscle spams      lithium 600 MG capsule Take 1 capsule by mouth Every Night. 600 PM  0    losartan (COZAAR) 100 MG tablet Take 1 tablet by mouth Daily. 90 tablet 2    multivitamin with minerals tablet tablet Take 1 tablet by mouth Daily.      buPROPion SR (WELLBUTRIN SR) 100 MG 12 hr tablet  (Patient not taking: Reported on 11/3/2024)      buPROPion XL (WELLBUTRIN XL) 150 MG 24 hr tablet Take 1 tablet by mouth Daily As Needed.      clonazePAM (KlonoPIN) 0.5 MG tablet Take 1 tablet by mouth At Night As Needed.      lithium (ESKALITH) 450 MG CR tablet Take 1 tablet by mouth Every Night. 450 am      Melatonin 10 MG tablet Take 1 tablet by mouth At Night As Needed.      mupirocin (BACTROBAN) 2 % ointment Apply 1 Application topically to the appropriate area as directed Daily. PRN      predniSONE (DELTASONE) 20 MG tablet       triamcinolone (KENALOG) 0.1 % cream Apply 1 Application topically to the appropriate area as directed 2 (Two) Times a Day.       Allergies   Allergen Reactions    Quetiapine Other (See Comments)     Becomes violent when awakening    Haloperidol Other (See Comments)     Locked up jaw    Amlodipine-Valsartan-Hctz Swelling    Aripiprazole Swelling    Olanzapine Unknown - Low Severity    Risperidone Other (See Comments)    Sulfa Antibiotics     Sulfamethoxazole-Trimethoprim     Valproic Acid Irritability    Amlodipine Swelling and Palpitations     SWELLING OF ANKLES     Bupropion Other (See Comments)       Objective    Objective     Vital Signs  Temp:  [98 °F (36.7 °C)-98.5 °F (36.9 °C)] 98.5 °F (36.9 °C)  Heart Rate:  [68-89] 79  Resp:  [18-20] 18  BP: (131-153)/() 146/97  SpO2:  [95 %-99 %] 96 %  on  Flow (L/min) (Oxygen Therapy):  [6] 6;   Device (Oxygen Therapy): room air  Body mass index is 38.52 kg/m².    Physical Exam  Vitals and nursing note reviewed.   Constitutional:       General: He is awake. He is not in acute distress.     Appearance: He is overweight.   Cardiovascular:      Rate and Rhythm: Normal rate and regular rhythm.      Pulses: Normal pulses.      Heart sounds: Normal heart sounds.   Pulmonary:      Effort: Pulmonary effort is normal. No respiratory distress.      Breath sounds: Normal breath sounds.   Abdominal:      General: Bowel sounds are normal.      Palpations: Abdomen is soft.      Tenderness: There is no abdominal tenderness.   Musculoskeletal:         General: Tenderness (anterior chest wall) present.      Right lower leg: No edema.      Left lower leg: No edema.   Skin:     General: Skin is warm and dry.   Neurological:      Mental Status: He is alert.   Psychiatric:         Behavior: Behavior is cooperative.         Results Review:  I reviewed the patient's new clinical results.  I reviewed the patient's new imaging results and agree with the interpretation.  I reviewed the patient's other test results and agree with the interpretation  I personally viewed and interpreted the patient's EKG/Telemetry data  Discussed with ED provider.    Lab Results (last 24 hours)       Procedure Component Value Units Date/Time    CBC & Differential [[id number]]  (Normal) Collected: 11/03/24 0153    Specimen: Blood Updated: 11/03/24 0205    Narrative:      The following orders were created for panel order CBC & Differential.  Procedure                               Abnormality         Status                     ---------                                -----------         ------                     CBC Auto Differential[[id number]]        Normal              Final result                 Please view results for these tests on the individual orders.    Comprehensive Metabolic Panel [[id number]]  (Abnormal) Collected: 11/03/24 0153    Specimen: Blood Updated: 11/03/24 0205     Glucose 98 mg/dL      BUN 13 mg/dL      Creatinine 1.10 mg/dL      Sodium 136 mmol/L      Potassium 3.8 mmol/L      Chloride 102 mmol/L      CO2 26.0 mmol/L      Calcium 9.7 mg/dL      Total Protein 7.2 g/dL      Albumin 4.2 g/dL      ALT (SGPT) 51 U/L      AST (SGOT) 28 U/L      Alkaline Phosphatase 77 U/L      Total Bilirubin 1.0 mg/dL      Globulin 3.0 gm/dL      A/G Ratio 1.4 g/dL      BUN/Creatinine Ratio 11.8     Anion Gap 8.0 mmol/L      eGFR 82.8 mL/min/1.73     Narrative:      GFR Normal >60  Chronic Kidney Disease <60  Kidney Failure <15      BNP [[id number]]  (Normal) Collected: 11/03/24 0153    Specimen: Blood Updated: 11/03/24 0205     proBNP 37.7 pg/mL     Narrative:      This assay is used as an aid in the diagnosis of individuals suspected of having heart failure. It can be used as an aid in the diagnosis of acute decompensated heart failure (ADHF) in patients presenting with signs and symptoms of ADHF to the emergency department (ED). In addition, NT-proBNP of <300 pg/mL indicates ADHF is not likely.    Age Range Result Interpretation  NT-proBNP Concentration (pg/mL:      <50             Positive            >450                   Gray                 300-450                    Negative             <300    50-75           Positive            >900                  Gray                300-900                  Negative            <300      >75             Positive            >1800                  Gray                300-1800                  Negative            <300    Single High Sensitivity Troponin T [[id number]]  (Normal) Collected: 11/03/24 0153    Specimen: Blood Updated:  11/03/24 0205     HS Troponin T <6 ng/L     Narrative:      High Sensitive Troponin T Reference Range:  <14.0 ng/L- Negative Female for AMI  <22.0 ng/L- Negative Male for AMI  >=14 - Abnormal Female indicating possible myocardial injury.  >=22 - Abnormal Male indicating possible myocardial injury.   Clinicians would have to utilize clinical acumen, EKG, Troponin, and serial changes to determine if it is an Acute Myocardial Infarction or myocardial injury due to an underlying chronic condition.         CBC Auto Differential [[id number]]  (Normal) Collected: 11/03/24 0153    Specimen: Blood Updated: 11/03/24 0205     WBC 7.66 10*3/mm3      RBC 4.75 10*6/mm3      Hemoglobin 14.5 g/dL      Hematocrit 42.3 %      MCV 89.1 fL      MCH 30.5 pg      MCHC 34.3 g/dL      RDW 12.9 %      RDW-SD 42.0 fl      MPV 8.6 fL      Platelets 270 10*3/mm3      Neutrophil % 63.8 %      Lymphocyte % 23.0 %      Monocyte % 8.6 %      Eosinophil % 3.5 %      Basophil % 0.7 %      Immature Grans % 0.4 %      Neutrophils, Absolute 4.89 10*3/mm3      Lymphocytes, Absolute 1.76 10*3/mm3      Monocytes, Absolute 0.66 10*3/mm3      Eosinophils, Absolute 0.27 10*3/mm3      Basophils, Absolute 0.05 10*3/mm3      Immature Grans, Absolute 0.03 10*3/mm3      nRBC 0.0 /100 WBC     D-dimer, Quantitative [[id number]]  (Normal) Collected: 11/03/24 0153    Specimen: Blood Updated: 11/03/24 0429     D-Dimer, Quantitative <0.27 MCGFEU/mL     Narrative:      According to the assay 's published package insert, a normal (<0.50 MCGFEU/mL) D-dimer result in conjunction with a non-high clinical probability assessment, excludes deep vein thrombosis (DVT) and pulmonary embolism (PE) with high sensitivity.    D-dimer values increase with age and this can make VTE exclusion of an older population difficult. To address this, the [employer], based on best available evidence and recent guidelines, recommends that clinicians use age-adjusted  "D-dimer thresholds in patients greater than 50 years of age with: a) a low probability of PE who do not meet all Pulmonary Embolism Rule Out Criteria, or b) in those with intermediate probability of PE.   The formula for an age-adjusted D-dimer cut-off is \"age/100\".  For example, a 60 year old patient would have an age-adjusted cut-off of 0.60 MCGFEU/mL and an 80 year old 0.80 MCGFEU/mL.    Sedimentation Rate [[id number]]  (Normal) Collected: 11/03/24 0153    Specimen: Blood Updated: 11/03/24 0905     Sed Rate 2 mm/hr     Procalcitonin [[id number]]  (Normal) Collected: 11/03/24 0154    Specimen: Blood Updated: 11/03/24 0205     Procalcitonin 0.14 ng/mL     Narrative:      As a Marker for Sepsis (Non-Neonates):    1. <0.5 ng/mL represents a low risk of severe sepsis and/or septic shock.  2. >2 ng/mL represents a high risk of severe sepsis and/or septic shock.    As a Marker for Lower Respiratory Tract Infections that require antibiotic therapy:    PCT on Admission    Antibiotic Therapy       6-12 Hrs later    >0.5                Strongly Recommended  >0.25 - <0.5        Recommended   0.1 - 0.25          Discouraged              Remeasure/reassess PCT  <0.1                Strongly Discouraged     Remeasure/reassess PCT    As 28 day mortality risk marker: \"Change in Procalcitonin Result\" (>80% or <=80%) if Day 0 (or Day 1) and Day 4 values are available. Refer to http://www.[employer]-pct-calculator.com    Change in PCT <=80%  A decrease of PCT levels below or equal to 80% defines a positive change in PCT test result representing a higher risk for 28-day all-cause mortality of patients diagnosed with severe sepsis for septic shock.    Change in PCT >80%  A decrease of PCT levels of more than 80% defines a negative change in PCT result representing a lower risk for 28-day all-cause mortality of patients diagnosed with severe sepsis or septic shock.       Magnesium [[id number]]  (Normal) Collected: 11/03/24 0154    Specimen: " Blood Updated: 11/03/24 0205     Magnesium 2.1 mg/dL     Single High Sensitivity Troponin T [[id number]]  (Normal) Collected: 11/03/24 0447    Specimen: Blood Updated: 11/03/24 0521     HS Troponin T <6 ng/L     Narrative:      High Sensitive Troponin T Reference Range:  <14.0 ng/L- Negative Female for AMI  <22.0 ng/L- Negative Male for AMI  >=14 - Abnormal Female indicating possible myocardial injury.  >=22 - Abnormal Male indicating possible myocardial injury.   Clinicians would have to utilize clinical acumen, EKG, Troponin, and serial changes to determine if it is an Acute Myocardial Infarction or myocardial injury due to an underlying chronic condition.         C-reactive Protein [[id number]]  (Normal) Collected: 11/03/24 0447    Specimen: Blood Updated: 11/03/24 0723     C-Reactive Protein 0.40 mg/dL     Basic Metabolic Panel [[id number]]  (Normal) Collected: 11/03/24 0709    Specimen: Blood Updated: 11/03/24 0734     Glucose 94 mg/dL      BUN 13 mg/dL      Creatinine 1.09 mg/dL      Sodium 137 mmol/L      Potassium 4.0 mmol/L      Chloride 104 mmol/L      CO2 24.0 mmol/L      Calcium 9.3 mg/dL      BUN/Creatinine Ratio 11.9     Anion Gap 9.0 mmol/L      eGFR 83.7 mL/min/1.73     Narrative:      GFR Normal >60  Chronic Kidney Disease <60  Kidney Failure <15      CBC (No Diff) [[id number]]  (Normal) Collected: 11/03/24 0709    Specimen: Blood Updated: 11/03/24 0719     WBC 7.72 10*3/mm3      RBC 4.60 10*6/mm3      Hemoglobin 14.0 g/dL      Hematocrit 41.0 %      MCV 89.1 fL      MCH 30.4 pg      MCHC 34.1 g/dL      RDW 13.1 %      RDW-SD 42.6 fl      MPV 8.4 fL      Platelets 254 10*3/mm3     High Sensitivity Troponin T [[id number]]  (Normal) Collected: 11/03/24 0709    Specimen: Blood Updated: 11/03/24 0734     HS Troponin T <6 ng/L     Narrative:      High Sensitive Troponin T Reference Range:  <14.0 ng/L- Negative Female for AMI  <22.0 ng/L- Negative Male for AMI  >=14 - Abnormal Female indicating possible  myocardial injury.  >=22 - Abnormal Male indicating possible myocardial injury.   Clinicians would have to utilize clinical acumen, EKG, Troponin, and serial changes to determine if it is an Acute Myocardial Infarction or myocardial injury due to an underlying chronic condition.         High Sensitivity Troponin T 2Hr [[id number]] Collected: 11/03/24 0907    Specimen: Blood Updated: 11/03/24 1022     HS Troponin T <6 ng/L      Troponin T Delta --     Comment: Unable to calculate.       Narrative:      High Sensitive Troponin T Reference Range:  <14.0 ng/L- Negative Female for AMI  <22.0 ng/L- Negative Male for AMI  >=14 - Abnormal Female indicating possible myocardial injury.  >=22 - Abnormal Male indicating possible myocardial injury.   Clinicians would have to utilize clinical acumen, EKG, Troponin, and serial changes to determine if it is an Acute Myocardial Infarction or myocardial injury due to an underlying chronic condition.                 Imaging Results (Last 24 Hours)       Procedure Component Value Units Date/Time    XR Chest 1 View [[id number]] Collected: 11/03/24 0531     Updated: 11/03/24 0531    Narrative:        Patient: FREYA ENNIS  Time Out: 05:30  Exam(s): XR CXR 1 VIEW     EXAM:    XR Chest, 1 View    CLINICAL HISTORY:     Reason for exam: SOA Triage Protocol.    TECHNIQUE:    Frontal view of the chest.    COMPARISON:    No relevant prior studies available.    FINDINGS:    Lungs:  No consolidation.    Pleural space:  No pleural effusion or pneumothorax.    Heart:  No cardiomegaly.    Tubes, lines and devices:  Tracheostomy.    IMPRESSION:         No acute cardiopulmonary abnormality.      Impression:          Electronically signed by Anuj William MD on 11-03-24 at 0530            Results for orders placed during the hospital encounter of 11/05/21    Adult Transthoracic Echo Complete W/ Cont if Necessary Per Protocol    Interpretation Summary  · Left ventricular ejection fraction appears to be  61 - 65%. Left ventricular systolic function is normal.  · Left ventricular wall thickness is consistent with mild to moderate concentric hypertrophy  · Left ventricular diastolic function was indeterminate.  · The right ventricular cavity is mildly dilated. Normal right ventricular systolic function noted.  · Interatrial septal aneurysm present.  · Saline test results are negative  · The ascending aorta is mildly dilated at 3.8 cm  · There is no evidence of pericardial effusion      ECG 12 Lead Chest Pain   Preliminary Result   HEART RATE=73  bpm   RR Ydmotemf=349  ms   AL Uhblyeka=550  ms   P Horizontal Axis=-43  deg   P Front Axis=14  deg   QRSD Interval=97  ms   QT Bjmaytqq=646  ms   UYaZ=007  ms   QRS Axis=-9  deg   T Wave Axis=32  deg   - OTHERWISE NORMAL ECG -   Sinus rhythm   RSR' in V1 or V2, right VCD or RVH   ST elev, probable normal early repol pattern   Baseline wander in lead(s) V1,V2   Date and Time of Study:2024-11-03 08:55:36      ECG 12 Lead ED Triage Standing Order; SOA   Final Result   HEART RATE=71  bpm   RR Nlwjycse=213  ms   AL Umunuhtz=780  ms   P Horizontal Axis=-12  deg   P Front Axis=33  deg   QRSD Interval=99  ms   QT Cavifuoo=543  ms   AYcP=942  ms   QRS Axis=-4  deg   T Wave Axis=35  deg   - ABNORMAL ECG -   Sinus rhythm   No change from previous tracing   Electronically Signed By: Aaron StrangeKENDRA) ([facility]) 2024-11-03 07:48:18   Date and Time of Study:2024-11-03 02:07:59      Telemetry Scan   Final Result           Assessment/Plan     Active Hospital Problems    Diagnosis  POA    **Chest pain [R07.9]  Yes    History of pericarditis [Z86.79]  Not Applicable    Essential hypertension [I10]  Yes    Obesity (BMI 30-39.9) [E66.9]  Yes    Dyspnea [R06.00]  Yes    Tracheostomy status [Z93.0]  Not Applicable    Acute pericarditis [I30.9]  Yes    Bipolar I disorder [F31.9]  Yes    Gout [M10.9]  Yes      Resolved Hospital Problems   No resolved problems to display.     Mr. Tee is a 49 y.o.  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     Patient with chest pain on arrival, troponin, proBNP, d-dimer normal. Procalcitonin normal. EKG suggestive of pericarditis with ST changes, patient has history of this in the past. CXR unremarkable. Dyspnea transient in nature, worsened during periods of chest discomfort. Consult Cardiology. Monitor on telemetry.  Blood pressure slightly elevated on arrival, resume home medications and monitor response. Continue home medications for other chronic medical problems as above. Further management based on clinical course. Repeat BMP in AM to assess renal function, repeat CBC in AM to assess WBC.    VTE Prophylaxis - SCDs.  Code Status - Full code.       Les Colon DO  [facility]  11/03/24

## 2024-11-03 NOTE — CONSULTS
Patient Name: Wang Tee  :1975  49 y.o.    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  He was previously seen by Dr. Franco in .  At that time he was diagnosed with pericarditis.  He did not have the typical EKG findings of his history was consistent with that.  His ESR and CRP were negative at that time.  He was treated with steroids and colchicine and symptoms resolved.  He has been fine since .  2 days ago he developed substernal chest pressure worsened with inspiration worsened when laying backwards, it has persisted essentially constantly since then.  He is ruled out for troponins x 3.  His EKG shows some minimal ST changes throughout the precordium without any significant CT depression.  His ESR CRP D-dimer troponins all negative.  He has no exertional component.  He works at the [employer] and does a lot of work with his arms.  He does have some reproducible pain throughout the substernal area    Past Medical History:   Diagnosis Date    Abnormal serum thyroxine (T4) level     Bipolar affective     Depression     Disorder of vocal cord     vocal cord dystonia    Elevated liver enzymes     Gout     Gout     History of medical problems     Bipolar    Hypertension        Past Surgical History:   Procedure Laterality Date    TRACHEOSTOMY           Prior to Admission medications    Medication Sig Start Date End Date Taking? Authorizing Provider   allopurinol (ZYLOPRIM) 300 MG tablet Take 1 tablet by mouth Daily. 24  Yes Julia Knapp APRN   ascorbic acid  (VITAMIN C) 500 MG tablet Take 1 tablet by mouth Daily.  Patient taking differently: Take 1 tablet by mouth Daily As Needed (causes gout pain).   Yes Jennifer Parish MD   cholecalciferol (VITAMIN D3) 10 MCG (400 UNIT) tablet Take  by mouth Daily.   Yes Jennifer Parish MD   co-enzyme Q-10 30 MG capsule Take  by mouth Daily.   Yes Jennifer Parish MD   cyclobenzaprine (FLEXERIL) 10 MG tablet Take 1 tablet by mouth 3 (Three) Times a Day As Needed. for muscle spams 1/15/23  Yes Jennifer Parish MD   lithium 600 MG capsule Take 1 capsule by mouth Every Night. 600 PM 4/6/16  Yes Jennifer Parish MD   losartan (COZAAR) 100 MG tablet Take 1 tablet by mouth Daily. 9/19/24  Yes Julia Knapp APRN   multivitamin with minerals tablet tablet Take 1 tablet by mouth Daily.   Yes Jennifer Parish MD   buPROPion SR (WELLBUTRIN SR) 100 MG 12 hr tablet  1/3/23   Jennifer Parish MD   buPROPion XL (WELLBUTRIN XL) 150 MG 24 hr tablet Take 1 tablet by mouth Daily As Needed. 12/6/22   Jennifer Parish MD   clonazePAM (KlonoPIN) 0.5 MG tablet Take 1 tablet by mouth At Night As Needed. 10/5/22   Jennifer Parish MD   lithium (ESKALITH) 450 MG CR tablet Take 1 tablet by mouth Every Night. 450 am 4/11/14   Jennifer Parish MD   Melatonin 10 MG tablet Take 1 tablet by mouth At Night As Needed. 2/25/22   Jennifer Parish MD   mupirocin (BACTROBAN) 2 % ointment Apply 1 Application topically to the appropriate area as directed Daily. PRN 6/2/24   Jennifer Parish MD   predniSONE (DELTASONE) 20 MG tablet  1/10/23   Jennifer Parish MD   triamcinolone (KENALOG) 0.1 % cream Apply 1 Application topically to the appropriate area as directed 2 (Two) Times a Day. 10/20/22   Jennifer Parish MD       Allergies   Allergen Reactions    Quetiapine Other (See Comments)     Becomes violent when awakening    Haloperidol Other (See Comments)     Locked up jaw    Amlodipine-Valsartan-Hctz  "Swelling    Aripiprazole Swelling    Olanzapine Unknown - Low Severity    Risperidone Other (See Comments)    Sulfa Antibiotics     Sulfamethoxazole-Trimethoprim     Valproic Acid Irritability    Amlodipine Swelling and Palpitations     SWELLING OF ANKLES    Bupropion Other (See Comments)       Social History     Socioeconomic History    Marital status: Single   Tobacco Use    Smoking status: Never    Smokeless tobacco: Never   Vaping Use    Vaping status: Never Used   Substance and Sexual Activity    Alcohol use: Not Currently     Comment: social    Drug use: No    Sexual activity: Not Currently     Partners: Male       Family History   Problem Relation Age of Onset    Hypertension Mother     Arthritis Mother     Diabetes Mother     Kidney disease Mother     Hypertension Father     Anxiety disorder Other     Bipolar disorder Other        REVIEW OF SYSTEMS:   All systems reviewed.  Pertinent positives identified in HPI.  All other systems are negative.      Objective:     Vitals:    11/03/24 0400 11/03/24 0446 11/03/24 0600 11/03/24 0725   BP: 131/89 153/96  146/97   BP Location:    Right arm   Patient Position:    Sitting   Pulse: 68 84 74 79   Resp:    18   Temp:    98.5 °F (36.9 °C)   TempSrc:    Oral   SpO2: 96% 97% 96% 96%   Weight:   115 kg (253 lb 4.9 oz)    Height:   172.7 cm (68\")      Body mass index is 38.52 kg/m².    General Appearance:    Alert, cooperative, in no acute distress   Head:    Normocephalic, without obvious abnormality, atraumatic   Eyes:            Lids and lashes normal, conjunctivae and sclerae normal, no icterus, no pallor, corneas clear, PERRLA   Ears:    Ears appear intact with no abnormalities noted   Throat:   No oral lesions, no thrush, oral mucosa moist   Neck:   No adenopathy, supple, trachea midline, no thyromegaly, no carotid bruit, no JVD   Back:     No kyphosis present, no scoliosis present, no skin lesions, erythema or scars, no tenderness to percussion or palpation, range " of motion normal   Lungs:     Clear to auscultation, respirations regular, even and unlabored    Heart:    Regular rhythm and normal rate, normal S1 and S2, no murmur, no gallop, no rub, no click   Chest Wall:    No abnormalities observed   Abdomen:     Normal bowel sounds, no masses, no organomegaly, soft, nontender, nondistended, no guarding, no rebound  tenderness   Extremities:   Moves all extremities well, no edema, no cyanosis, no redness   Pulses:   Pulses palpable and equal bilaterally. Normal radial, carotid, femoral, dorsalis pedis and posterior tibial pulses bilaterally. Normal abdominal aorta   Skin:  Psychiatric:   No bleeding, bruising or rash    Alert and oriented x 3, normal mood and affect   Lab Review:     Results from last 7 days   Lab Units 11/03/24  0709 11/03/24  0153   SODIUM mmol/L 137 136   POTASSIUM mmol/L 4.0 3.8   CHLORIDE mmol/L 104 102   CO2 mmol/L 24.0 26.0   BUN mg/dL 13 13   CREATININE mg/dL 1.09 1.10   CALCIUM mg/dL 9.3 9.7   BILIRUBIN mg/dL  --  1.0   ALK PHOS U/L  --  77   ALT (SGPT) U/L  --  51*   AST (SGOT) U/L  --  28   GLUCOSE mg/dL 94 98     Results from last 7 days   Lab Units 11/03/24  0709 11/03/24  0447 11/03/24  0153   HSTROP T ng/L <6 <6 <6     Results from last 7 days   Lab Units 11/03/24  0709   WBC 10*3/mm3 7.72   HEMOGLOBIN g/dL 14.0   HEMATOCRIT % 41.0   PLATELETS 10*3/mm3 254         Results from last 7 days   Lab Units 11/03/24  0154   MAGNESIUM mg/dL 2.1                   I personally viewed and interpreted the patient's EKG/Telemetry data.        Assessment and Plan:       1.  Chest pain: While his EKG is not classic for pericarditis he states it feels similar to what he has had in the past.  He was treated effectively with pericarditis treatment.  Will plan colchicine 0.6 twice daily as well as Indocin 25 3 times daily for the next 2 weeks.  He will follow-up with Dr. Franco in 2 weeks and plan to taper at that point.     Clara Maravilla MD  11/03/24  09:45  EST

## 2024-11-03 NOTE — OUTREACH NOTE
Prep Survey      Flowsheet Row Responses   [facility] patient discharged from? [place]   Is LACE score < 7 ? Yes   Eligibility [facility]   Date of Admission 11/03/24   Date of Discharge 11/03/24   Discharge Disposition Home or Self Care   Discharge diagnosis Chest pain   Does the patient have one of the following disease processes/diagnoses(primary or secondary)? Other   Does the patient have Home health ordered? No   Is there a DME ordered? No   Medication alerts for this patient see AVS   Prep survey completed? Yes            Marilynn VANEGAS - Registered Nurse

## 2024-11-03 NOTE — DISCHARGE SUMMARY
Patient Name: Wang Tee  : 1975  MRN: [id number]    Date of Admission: 11/3/2024  Date of Discharge:  11/3/2024  Primary Care Physician: Julia Knapp APRN      Chief Complaint:   Shortness of Breath      Discharge Diagnoses     Active Hospital Problems    Diagnosis  POA    **Chest pain [R07.9]  Yes    History of pericarditis [Z86.79]  Not Applicable    Essential hypertension [I10]  Yes    Obesity (BMI 30-39.9) [E66.9]  Yes    Dyspnea [R06.00]  Yes    Tracheostomy status [Z93.0]  Not Applicable    Acute pericarditis [I30.9]  Yes    Bipolar I disorder [F31.9]  Yes    Gout [M10.9]  Yes      Resolved Hospital Problems   No resolved problems to display.        Hospital Course     Mr. Tee is a 49 y.o. 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 Please see the admitting history and physical for further details.  He was admitted to the hospital for further evaluation and treatment.     Chest discomfort endorsed on arrival as listed above. Further workup pursued. Troponin, proBNP, d-dimer normal. Procalcitonin normal. EKG showing ST changes. CXR unremarkable. Dyspnea transient in nature, worsened during periods of chest discomfort. Patient afebrile, WBC count normal, no evidence of superimposed infection.  Cardiology evaluated, and noted that while EKG was not classic for pericarditis, his symptoms were similar to what he experienced previously when he was diagnosed and because of this, in the setting of presenting symptoms, plan to treat for Pericarditis. Discussed with Dr. Maravilla, initial plan was for Indomethacin 25 mg TID and Colchicine 0.6 mg BID x 2 weeks, however, received notification warning that Indomethacin could increase concentration of lithium and lead to toxicity, so decision made to instead use Aspirin 650 mg TID along  with Colchicine 0.6 mg BID for 2 weeks. Cardiology going to arrange for patient to follow up with Dr. Franco in 2 weeks for reassessment and initiation of medication tapering. Patient felt slightly better by time of discharge and he was cleared by cardiology with plans as above, no further indications for acute inpatient management at this time. This was discussed with patient who voiced understanding and was agreeable with plan. Cardiology recommended that patient be off work for ~1 week to allow for improvement in symptoms, given the strenuous nature of his job and they noted plans to write for work excuse. Otherwise, other chronic medical conditions appear stable at present, resume treatments as previously prescribed. Recommend that patient check blood pressure 2-3 times daily and record those values in log book and take it to next PCP visit to allow for greater insight into treatment efficacy to guide further management decisions. Recommend heart healthy/low sodium diet. Recommend close follow up with PCP within 1 week after discharge for reassessment to guide ongoing management decisions.    Day of Discharge     Subjective:  See H&P    Physical Exam:  Temp:  [98 °F (36.7 °C)-98.5 °F (36.9 °C)] 98.5 °F (36.9 °C)  Heart Rate:  [68-89] 79  Resp:  [18-20] 18  BP: (131-153)/() 146/97  Body mass index is 38.52 kg/m².  Physical exam: See H&P    Consultants     Consult Orders (all) (From admission, onward)       Start     Ordered    11/03/24 0455  Inpatient Cardiology Consult  Once        Specialty:  Cardiology  Provider:  Clara Maravilla MD    11/03/24 0455 11/03/24 0445  A (on-call MD unless specified) Details  Once        Specialty:  Hospitalist  Provider:  (Not yet assigned)    11/03/24 0444                  Procedures     * Surgery not found *    Imaging Results (All)       Procedure Component Value Units Date/Time    XR Chest 1 View [[id number]] Collected: 11/03/24 0531     Updated: 11/03/24 0531     Narrative:        Patient: FREYA ENNIS  Time Out: 05:30  Exam(s): XR CXR 1 VIEW     EXAM:    XR Chest, 1 View    CLINICAL HISTORY:     Reason for exam: SOA Triage Protocol.    TECHNIQUE:    Frontal view of the chest.    COMPARISON:    No relevant prior studies available.    FINDINGS:    Lungs:  No consolidation.    Pleural space:  No pleural effusion or pneumothorax.    Heart:  No cardiomegaly.    Tubes, lines and devices:  Tracheostomy.    IMPRESSION:         No acute cardiopulmonary abnormality.      Impression:          Electronically signed by Anuj William MD on 11-03-24 at 0530            Results for orders placed during the hospital encounter of 11/05/21    Adult Transthoracic Echo Complete W/ Cont if Necessary Per Protocol    Interpretation Summary  · Left ventricular ejection fraction appears to be 61 - 65%. Left ventricular systolic function is normal.  · Left ventricular wall thickness is consistent with mild to moderate concentric hypertrophy  · Left ventricular diastolic function was indeterminate.  · The right ventricular cavity is mildly dilated. Normal right ventricular systolic function noted.  · Interatrial septal aneurysm present.  · Saline test results are negative  · The ascending aorta is mildly dilated at 3.8 cm  · There is no evidence of pericardial effusion    Pertinent Labs     Results from last 7 days   Lab Units 11/03/24  0709 11/03/24  0153   WBC 10*3/mm3 7.72 7.66   HEMOGLOBIN g/dL 14.0 14.5   PLATELETS 10*3/mm3 254 270     Results from last 7 days   Lab Units 11/03/24  0709 11/03/24  0153   SODIUM mmol/L 137 136   POTASSIUM mmol/L 4.0 3.8   CHLORIDE mmol/L 104 102   CO2 mmol/L 24.0 26.0   BUN mg/dL 13 13   CREATININE mg/dL 1.09 1.10   GLUCOSE mg/dL 94 98   EGFR mL/min/1.73 83.7 82.8     Results from last 7 days   Lab Units 11/03/24  0153   ALBUMIN g/dL 4.2   BILIRUBIN mg/dL 1.0   ALK PHOS U/L 77   AST (SGOT) U/L 28   ALT (SGPT) U/L 51*     Results from last 7 days   Lab Units  "11/03/24  0709 11/03/24  0154 11/03/24 0153   CALCIUM mg/dL 9.3  --  9.7   ALBUMIN g/dL  --   --  4.2   MAGNESIUM mg/dL  --  2.1  --        Results from last 7 days   Lab Units 11/03/24  0907 11/03/24  0709 11/03/24  0447 11/03/24 0153   HSTROP T ng/L <6 <6 <6 <6   PROBNP pg/mL  --   --   --  37.7   D DIMER QUANT MCGFEU/mL  --   --   --  <0.27           Invalid input(s): \"LDLCALC\"          Test Results Pending at Discharge     Pending Results       Procedure [Order ID] Specimen - Date/Time    Respiratory Panel PCR w/COVID-19(SARS-CoV-2) [facility]/FRANCISCA/[facility]/SWATHI In-House, NP Swab in UTM/VTM, 2 HR TAT - Swab, Nasopharynx [[id number]] Collected: 11/03/24 0153    Specimen: Swab from Nasopharynx               Discharge Details        Discharge Medications        New Medications        Instructions Start Date   aspirin 325 MG EC tablet   650 mg, Oral, Every 8 Hours      colchicine 0.6 MG tablet   0.6 mg, Oral, 2 Times Daily             Continue These Medications        Instructions Start Date   allopurinol 300 MG tablet  Commonly known as: ZYLOPRIM   300 mg, Oral, Daily      ascorbic acid 500 MG tablet  Commonly known as: VITAMIN C   500 mg, Daily      buPROPion  MG 24 hr tablet  Commonly known as: WELLBUTRIN XL   150 mg, Daily PRN      cholecalciferol 10 MCG (400 UNIT) tablet  Commonly known as: VITAMIN D3   Daily      clonazePAM 0.5 MG tablet  Commonly known as: KlonoPIN   0.5 mg, Oral, Nightly PRN      co-enzyme Q-10 30 MG capsule   Daily      cyclobenzaprine 10 MG tablet  Commonly known as: FLEXERIL   10 mg, 3 Times Daily PRN      lithium 450 MG CR tablet  Commonly known as: ESKALITH   450 mg, Oral, Nightly, 450 am      lithium 600 MG capsule   600 mg, Nightly      losartan 100 MG tablet  Commonly known as: COZAAR   100 mg, Oral, Daily      Melatonin 10 MG tablet   10 mg, Oral, Nightly PRN      multivitamin with minerals tablet tablet   1 tablet, Daily      mupirocin 2 % ointment  Commonly known as: BACTROBAN   " 1 Application, Topical, Daily, PRN      predniSONE 20 MG tablet  Commonly known as: DELTASONE   No dose, route, or frequency recorded.      triamcinolone 0.1 % cream  Commonly known as: KENALOG   1 application , Topical, 2 Times Daily             ASK your doctor about these medications        Instructions Start Date   buPROPion  MG 12 hr tablet  Commonly known as: WELLBUTRIN SR   No dose, route, or frequency recorded.               Allergies   Allergen Reactions    Quetiapine Other (See Comments)     Becomes violent when awakening    Haloperidol Other (See Comments)     Locked up jaw    Amlodipine-Valsartan-Hctz Swelling    Aripiprazole Swelling    Olanzapine Unknown - Low Severity    Risperidone Other (See Comments)    Sulfa Antibiotics     Sulfamethoxazole-Trimethoprim     Valproic Acid Irritability    Amlodipine Swelling and Palpitations     SWELLING OF ANKLES    Bupropion Other (See Comments)       Discharge Disposition:  Home or Self Care      Discharge Diet:  Diet Order   Procedures    Diet: Cardiac; Healthy Heart (2-3 Na+); Fluid Consistency: Thin (IDDSI 0)       Discharge Activity:   Activity Instructions       Other Activity Instructions      Activity Instructions: Off work for 1 week per cardiology recommendations            CODE STATUS:    Code Status and Medical Interventions: CPR (Attempt to Resuscitate); Full Support   Ordered at: 11/03/24 0455     Code Status (Patient has no pulse and is not breathing):    CPR (Attempt to Resuscitate)     Medical Interventions (Patient has pulse or is breathing):    Full Support       Future Appointments   Date Time Provider Department Center   1/30/2025  7:30 AM [facility]   2/6/2025  2:00 PM Julia Knapp APRN [facility]     Additional Instructions for the Follow-ups that You Need to Schedule       Discharge Follow-up with PCP   As directed       Currently Documented PCP:    Julia Knapp APRN    PCP Phone Number:     [phone]     Follow Up Details: within 1 week for reassessment, medication and symptom review, blood pressure check, BMP and CBC        Discharge Follow-up with Specialty: Cardiology-Dr. Franco   As directed      Specialty: Cardiology-Dr. Franco   Follow Up Details: in 2 weeks per cardiology recommendations. Please call office to ensure follow up made if you do not hear from them.               Follow-up Information       Julia Knapp APRN .    Specialty: Family Medicine  Why: within 1 week for reassessment, medication and symptom review, blood pressure check, BMP and CBC  Contact information:  [address]  [place] [address]  [phone]                             Additional Instructions for the Follow-ups that You Need to Schedule       Discharge Follow-up with PCP   As directed       Currently Documented PCP:    Julia Knapp APRN    PCP Phone Number:    [phone]     Follow Up Details: within 1 week for reassessment, medication and symptom review, blood pressure check, BMP and CBC        Discharge Follow-up with Specialty: Cardiology-Dr. Franco   As directed      Specialty: Cardiology-Dr. Franco   Follow Up Details: in 2 weeks per cardiology recommendations. Please call office to ensure follow up made if you do not hear from them.            Time Spent on Discharge:  Greater than 30 minutes      Les Colon DO  [facility]  11/03/24  10:56 EST

## 2024-11-03 NOTE — LETTER
November 3, 2024     Patient: Wang Tee   YOB: 1975   Date of Visit: 11/3/2024       To Whom It May Concern:    It is my medical opinion that Wang Tee should remain out of work until 11/9/24 .           Sincerely,

## 2024-11-04 ENCOUNTER — TRANSITIONAL CARE MANAGEMENT TELEPHONE ENCOUNTER (OUTPATIENT)
Dept: CALL CENTER | Facility: HOSPITAL | Age: 49
End: 2024-11-04
Payer: COMMERCIAL

## 2024-11-04 NOTE — OUTREACH NOTE
Call Center TCM Note      Flowsheet Row Responses   [facility] patient discharged from? [place]   Does the patient have one of the following disease processes/diagnoses(primary or secondary)? Other   TCM attempt successful? No  [VR- Parents]   Unsuccessful attempts Attempt 1            Danya Sebastian RN    11/4/2024, 08:52 EST

## 2024-11-04 NOTE — OUTREACH NOTE
Call Center TCM Note      Flowsheet Row Responses   [facility] patient discharged from? [place]   Does the patient have one of the following disease processes/diagnoses(primary or secondary)? Other   TCM attempt successful? No   Unsuccessful attempts Attempt 2            Danya Sebastian RN    11/4/2024, 16:02 EST

## 2024-11-05 ENCOUNTER — TRANSITIONAL CARE MANAGEMENT TELEPHONE ENCOUNTER (OUTPATIENT)
Dept: CALL CENTER | Facility: HOSPITAL | Age: 49
End: 2024-11-05
Payer: COMMERCIAL

## 2024-11-08 ENCOUNTER — OFFICE VISIT (OUTPATIENT)
Dept: INTERNAL MEDICINE | Facility: CLINIC | Age: 49
End: 2024-11-08
Payer: COMMERCIAL

## 2024-11-08 VITALS
DIASTOLIC BLOOD PRESSURE: 100 MMHG | WEIGHT: 253 LBS | HEIGHT: 68 IN | OXYGEN SATURATION: 98 % | HEART RATE: 50 BPM | SYSTOLIC BLOOD PRESSURE: 150 MMHG | BODY MASS INDEX: 38.34 KG/M2

## 2024-11-08 DIAGNOSIS — I10 ESSENTIAL HYPERTENSION: Primary | ICD-10-CM

## 2024-11-08 RX ORDER — NEBIVOLOL 5 MG/1
5 TABLET ORAL DAILY
Qty: 30 TABLET | Refills: 1 | Status: SHIPPED | OUTPATIENT
Start: 2024-11-08

## 2024-11-08 NOTE — PROGRESS NOTES
"Transitional Care Follow Up Visit  Subjective     Wang Tee is a 49 y.o. male who presents for a transitional care management visit.    Within 48 business hours after discharge our office contacted him via telephone to coordinate his care and needs.      I reviewed and discussed the details of that call along with the discharge summary, hospital problems, inpatient lab results, inpatient diagnostic studies, and consultation reports with [facility].     Current outpatient and discharge medications have been reconciled for the patient.  Reviewed by: JUANITA Vicente          11/3/2024     1:44 PM   Date of TCM Phone Call   [facility]   Date of Admission 11/3/2024   Date of Discharge 11/3/2024   Discharge Disposition Home or Self Care     Risk for Readmission (LACE) Score: 1 (11/3/2024  7:06 AM)      History of Present Illness   Course During Hospital Stay:  Patient was admitted to the hosptial due to chest pain. He was diagnosed with pericarditis. He was seen by cardiology and has a follow up appt with cardiology tomorrow. He is being treated with aspirin and colchicine for his pericarditis.     Patient hasn't checked his BP at home. His BP is elevated today. Patient is feeling better     The following portions of the patient's history were reviewed and updated as appropriate: allergies, current medications, past family history, past medical history, past social history, past surgical history, and problem list.    Review of Systems    Objective   /100 (Patient Position: Sitting, Cuff Size: Large Adult)   Pulse 50   Ht 172.7 cm (67.99\")   Wt 115 kg (253 lb)   SpO2 98%   BMI 38.48 kg/m²   Physical Exam  Vitals and nursing note reviewed.   Constitutional:       Appearance: Normal appearance. He is well-developed.   Cardiovascular:      Rate and Rhythm: Normal rate and regular rhythm.      Heart sounds: Normal heart sounds.   Pulmonary:      Effort: Pulmonary effort is normal.      " Breath sounds: Normal breath sounds.   Skin:     General: Skin is warm and dry.   Neurological:      Mental Status: He is alert and oriented to person, place, and time.   Psychiatric:         Speech: Speech normal.         Behavior: Behavior normal.         Thought Content: Thought content normal.     Assessment & Plan   Diagnoses and all orders for this visit:    1. Essential hypertension (Primary)  -     nebivolol (Bystolic) 5 MG tablet; Take 1 tablet by mouth Daily.  Dispense: 30 tablet; Refill: 1      HTN - add bystolic to losartan 100 mg daily. He will follow up with cardiology. Has an appt to follow up in February.     Requesting another week off work. Go back on November 16, 2024

## 2024-11-08 NOTE — LETTER
November 14, 2024     Patient: Wang Tee   YOB: 1975   Date of Visit: 11/8/2024       To Whom It May Concern:    It is my medical opinion that Wang Tee may return to work on November 16, 2024. He was seen in my office today.          Sincerely,        JUANITA Vicente    CC: No Recipients

## 2024-11-18 ENCOUNTER — OFFICE VISIT (OUTPATIENT)
Dept: CARDIOLOGY | Facility: CLINIC | Age: 49
End: 2024-11-18
Payer: COMMERCIAL

## 2024-11-18 VITALS
SYSTOLIC BLOOD PRESSURE: 148 MMHG | HEART RATE: 75 BPM | OXYGEN SATURATION: 95 % | BODY MASS INDEX: 42.69 KG/M2 | HEIGHT: 67 IN | WEIGHT: 272 LBS | DIASTOLIC BLOOD PRESSURE: 66 MMHG

## 2024-11-18 DIAGNOSIS — Z86.79 HISTORY OF PERICARDITIS: Primary | ICD-10-CM

## 2024-11-18 DIAGNOSIS — I10 ESSENTIAL HYPERTENSION: ICD-10-CM

## 2024-11-18 PROCEDURE — 99214 OFFICE O/P EST MOD 30 MIN: CPT | Performed by: NURSE PRACTITIONER

## 2024-11-18 PROCEDURE — 93000 ELECTROCARDIOGRAM COMPLETE: CPT | Performed by: NURSE PRACTITIONER

## 2024-11-18 NOTE — PROGRESS NOTES
Date of Office Visit: 2024  Encounter Provider: JUANITA Ruffin  Place of Service: [facility] CARDIOLOGY  Patient Name: Wang Tee  :1975    Chief Complaint   Patient presents with    Chest Pain   :     HPI: Wang Tee is a 49 y.o. male patient who was initially seen by Dr. Franco's when he presented in 2021 with pericarditis.  Echocardiogram demonstrated normal LV function, mild to moderate LVH, and a mildly dilated ascending aorta.  He was with ibuprofen and colchicine.    He was last seen in the office by Dr. Franco in 2022 at which time he was doing well.  No changes were made to his regimen, and he was advised to follow-up as needed.    On 11/3, he presented with chest pressure similar to previous.  His workup was unrevealing.  He was seen in consultation by Dr. Maravilla.  She noted his EKG was not classic for pericarditis; however, given similar presentation, he was started on colchicine and high dose aspirin  He was advised to follow-up in 2 weeks.    Overall, he is feeling better.  He says he is close to his baseline, about 80%.  He is still experiencing intermittent chest pressure.  There are no aggravating or alleviating factors.  Is not exertional in nature.  He has chronic intermittent shortness of breath.  Of note, he has a tracheostomy secondary to vocal cord dysfunction.  He denies any palpitations, edema, dizziness, syncope.    Past Medical History:   Diagnosis Date    Abnormal serum thyroxine (T4) level     Bipolar affective     Depression     Disorder of vocal cord     vocal cord dystonia    Elevated liver enzymes     Gout     Gout     History of medical problems     Bipolar    Hypertension        Past Surgical History:   Procedure Laterality Date    TRACHEOSTOMY         Social History     Socioeconomic History    Marital status: Single   Tobacco Use    Smoking status: Never    Smokeless tobacco: Never   Vaping Use     Vaping status: Never Used   Substance and Sexual Activity    Alcohol use: Not Currently     Comment: social    Drug use: No    Sexual activity: Not Currently     Partners: Male       Family History   Problem Relation Age of Onset    Hypertension Mother     Arthritis Mother     Diabetes Mother     Kidney disease Mother     Hypertension Father     Anxiety disorder Other     Bipolar disorder Other        Review of Systems   Constitutional: Negative.   Cardiovascular:  Positive for chest pain. Negative for dyspnea on exertion, leg swelling, orthopnea, paroxysmal nocturnal dyspnea and syncope.   Respiratory:  Positive for shortness of breath.    Hematologic/Lymphatic: Negative for bleeding problem.   Musculoskeletal:  Negative for falls.   Gastrointestinal:  Negative for melena.   Neurological:  Negative for dizziness and light-headedness.       Allergies   Allergen Reactions    Quetiapine Other (See Comments)     Becomes violent when awakening    Haloperidol Other (See Comments)     Locked up jaw    Amlodipine-Valsartan-Hctz Swelling    Aripiprazole Swelling    Olanzapine Unknown - Low Severity    Risperidone Other (See Comments)    Sulfa Antibiotics     Sulfamethoxazole-Trimethoprim     Valproic Acid Irritability    Amlodipine Swelling and Palpitations     SWELLING OF ANKLES    Bupropion Other (See Comments)         Current Outpatient Medications:     allopurinol (ZYLOPRIM) 300 MG tablet, Take 1 tablet by mouth Daily., Disp: 90 tablet, Rfl: 3    ascorbic acid (VITAMIN C) 500 MG tablet, Take 1 tablet by mouth Daily. (Patient taking differently: Take 1 tablet by mouth Daily As Needed (causes gout pain).), Disp: , Rfl:     aspirin 325 MG EC tablet, Take 2 tablets by mouth Every 8 (Eight) Hours for 14 days., Disp: 84 tablet, Rfl: 0    buPROPion SR (WELLBUTRIN SR) 100 MG 12 hr tablet, , Disp: , Rfl:     buPROPion XL (WELLBUTRIN XL) 150 MG 24 hr tablet, Take 1 tablet by mouth Daily As Needed., Disp: , Rfl:      "cholecalciferol (VITAMIN D3) 10 MCG (400 UNIT) tablet, Take  by mouth Daily., Disp: , Rfl:     clonazePAM (KlonoPIN) 0.5 MG tablet, Take 1 tablet by mouth At Night As Needed., Disp: , Rfl:     co-enzyme Q-10 30 MG capsule, Take  by mouth Daily., Disp: , Rfl:     colchicine 0.6 MG tablet, Take 1 tablet by mouth 2 (Two) Times a Day for 14 days., Disp: 28 tablet, Rfl: 0    cyclobenzaprine (FLEXERIL) 10 MG tablet, Take 1 tablet by mouth 3 (Three) Times a Day As Needed. for muscle spams, Disp: , Rfl:     lithium 600 MG capsule, Take 1 capsule by mouth 2 (Two) Times a Day. 600 PM, Disp: , Rfl: 0    losartan (COZAAR) 100 MG tablet, Take 1 tablet by mouth Daily., Disp: 90 tablet, Rfl: 2    Melatonin 10 MG tablet, Take 1 tablet by mouth At Night As Needed., Disp: , Rfl:     multivitamin with minerals tablet tablet, Take 1 tablet by mouth Daily., Disp: , Rfl:     mupirocin (BACTROBAN) 2 % ointment, Apply 1 Application topically to the appropriate area as directed Daily. PRN, Disp: , Rfl:     nebivolol (Bystolic) 5 MG tablet, Take 1 tablet by mouth Daily., Disp: 30 tablet, Rfl: 1    predniSONE (DELTASONE) 20 MG tablet, , Disp: , Rfl:     triamcinolone (KENALOG) 0.1 % cream, Apply 1 Application topically to the appropriate area as directed 2 (Two) Times a Day., Disp: , Rfl:       Objective:     Vitals:    11/18/24 1022   BP: 148/66   Pulse: 75   SpO2: 95%   Weight: 123 kg (272 lb)   Height: 170.2 cm (67\")     Body mass index is 42.6 kg/m².    PHYSICAL EXAM:    Neck:      Vascular: No JVD.   Pulmonary:      Effort: Pulmonary effort is normal.      Breath sounds: Normal breath sounds.   Cardiovascular:      Normal rate. Regular rhythm.      Murmurs: There is no murmur.      No gallop.  No click. No rub.   Pulses:     Intact distal pulses.           ECG 12 Lead    Date/Time: 11/18/2024 10:52 AM  Performed by: Emily Ridley APRN    Authorized by: Emily Ridley, JUANITA  Comparison: compared with previous ECG from " 11/3/2024  Similar to previous ECG  Rhythm: sinus rhythm  Rate: normal  BPM: 73            Assessment:       Diagnosis Plan   1. History of pericarditis  ECG 12 Lead      2. Essential hypertension          Orders Placed This Encounter   Procedures    ECG 12 Lead     This order was created via procedure documentation     Order Specific Question:   Release to patient     Answer:   Routine Release [[id number]]          Plan:       1.  Pericarditis.  Symptoms improved with colchicine and high-dose aspirin.  Will discuss the plan moving forward with Dr. Franco.      2.  Hypertension.  He has been following with his primary care doctor who recently added Bystolic to his regimen.  We can give it a few more weeks, but may need to consider increasing the Bystolic to 10 mg.      Overall, I think he is stable.  Further recommendations will be made pending my discussion with Dr. Franco.  He will follow-up with Dr. Franco in 6 weeks.      As always, it has been a pleasure to participate in your patient's care.      Sincerely,         JUANITA Sandy

## 2024-11-19 ENCOUNTER — TELEPHONE (OUTPATIENT)
Dept: CARDIOLOGY | Facility: CLINIC | Age: 49
End: 2024-11-19
Payer: COMMERCIAL

## 2024-11-19 DIAGNOSIS — Z86.79 HISTORY OF PERICARDITIS: Primary | ICD-10-CM

## 2024-11-19 RX ORDER — IBUPROFEN 600 MG/1
600 TABLET, FILM COATED ORAL 3 TIMES DAILY
Start: 2024-11-19 | End: 2024-11-26

## 2024-11-19 RX ORDER — COLCHICINE 0.6 MG/1
0.6 TABLET ORAL 2 TIMES DAILY
Qty: 60 TABLET | Refills: 2 | Status: SHIPPED | OUTPATIENT
Start: 2024-11-19

## 2024-11-19 NOTE — TELEPHONE ENCOUNTER
----- Message from Johnny Franco sent at 11/18/2024  9:24 PM EST -----  I would recommend colchicine for three months. It would not be unreasonable to do a week of scheduled Ibuprofen 600 mg TID instead of aspirin.  ----- Message -----  From: Emily Ridley APRN  Sent: 11/18/2024  12:07 PM EST  To: Johnny Franco MD    He presented to the ED 2 weeks ago with chest pain reminiscent of his previous symptoms with pericarditis.  Although his workup was unrevealing, he was restarted on colchicine and high-dose aspirin.    I saw him today for follow-up.  He is feeling 80% better.  What are your thoughts/recommendations regarding the recurrent pericarditis and treatment moving forward?

## 2024-11-19 NOTE — TELEPHONE ENCOUNTER
I spoke to the patient's mother regarding these instructions.  She verbalized understanding. I have also ordered an echo to be completed the same day as his appt with [facility] in January.     Please schedule echo on the same day as his appt with [facility] in January.

## 2024-12-26 ENCOUNTER — TELEPHONE (OUTPATIENT)
Dept: INTERNAL MEDICINE | Facility: CLINIC | Age: 49
End: 2024-12-26

## 2024-12-26 NOTE — TELEPHONE ENCOUNTER
Caller: Wang Tee    Relationship: Self    Best call back number: [phone]     What medication are you requesting: ANTIBIOTIC    What are your current symptoms: COUGH, CONGESTION, BODY ACHES    How long have you been experiencing symptoms: 2 DAYS    If a prescription is needed, what is your preferred pharmacy and phone number: [facility] [address] [facility] [address] [facility] [phone] [facility] [phone] FX     Additional notes: PATIENT STATES REQUESTS PRESCRIPTION TO TREAT ONGOING ISSUES. NO SAME DAY VISITS AVAILABLE IN OFFICE. PLEASE CALL IF ADDITIONAL FOLLOW UP NEEDED.

## 2024-12-31 DIAGNOSIS — I10 ESSENTIAL HYPERTENSION: ICD-10-CM

## 2024-12-31 RX ORDER — NEBIVOLOL 5 MG/1
5 TABLET ORAL DAILY
Qty: 30 TABLET | Refills: 0 | Status: ON HOLD | OUTPATIENT
Start: 2024-12-31

## 2025-01-02 ENCOUNTER — HOSPITAL ENCOUNTER (EMERGENCY)
Facility: HOSPITAL | Age: 50
Discharge: HOME | End: 2025-01-02
Attending: EMERGENCY MEDICINE
Payer: COMMERCIAL

## 2025-01-02 ENCOUNTER — APPOINTMENT (OUTPATIENT)
Dept: GENERAL RADIOLOGY | Facility: HOSPITAL | Age: 50
End: 2025-01-02
Payer: COMMERCIAL

## 2025-01-02 VITALS
HEIGHT: 69 IN | DIASTOLIC BLOOD PRESSURE: 91 MMHG | SYSTOLIC BLOOD PRESSURE: 136 MMHG | BODY MASS INDEX: 41.14 KG/M2 | OXYGEN SATURATION: 93 % | HEART RATE: 86 BPM | TEMPERATURE: 98.3 F | WEIGHT: 277.78 LBS | RESPIRATION RATE: 18 BRPM

## 2025-01-02 DIAGNOSIS — J98.01 BRONCHOSPASM: ICD-10-CM

## 2025-01-02 DIAGNOSIS — J10.1 INFLUENZA A: Primary | ICD-10-CM

## 2025-01-02 LAB
FLUAV SUBTYP SPEC NAA+PROBE: DETECTED
FLUBV RNA ISLT QL NAA+PROBE: NOT DETECTED
RSV RNA NPH QL NAA+NON-PROBE: NOT DETECTED
SARS-COV-2 RNA RESP QL NAA+PROBE: NOT DETECTED

## 2025-01-02 PROCEDURE — 96372 THER/PROPH/DIAG INJ SC/IM: CPT

## 2025-01-02 PROCEDURE — 71045 X-RAY EXAM CHEST 1 VIEW: CPT

## 2025-01-02 PROCEDURE — 99284 EMERGENCY DEPT VISIT MOD MDM: CPT | Performed by: EMERGENCY MEDICINE

## 2025-01-02 PROCEDURE — 25010000002 DEXAMETHASONE SODIUM PHOSPHATE 10 MG/ML SOLUTION: Performed by: EMERGENCY MEDICINE

## 2025-01-02 PROCEDURE — 87637 SARSCOV2&INF A&B&RSV AMP PRB: CPT | Performed by: EMERGENCY MEDICINE

## 2025-01-02 PROCEDURE — 99283 EMERGENCY DEPT VISIT LOW MDM: CPT

## 2025-01-02 RX ORDER — PREDNISONE 20 MG/1
TABLET ORAL
Qty: 9 TABLET | Refills: 0 | Status: SHIPPED | OUTPATIENT
Start: 2025-01-02 | End: 2025-01-08 | Stop reason: HOSPADM

## 2025-01-02 RX ORDER — ALBUTEROL SULFATE 0.83 MG/ML
2.5 SOLUTION RESPIRATORY (INHALATION) EVERY 4 HOURS PRN
Qty: 90 EACH | Refills: 0 | Status: SHIPPED | OUTPATIENT
Start: 2025-01-02

## 2025-01-02 RX ORDER — IPRATROPIUM BROMIDE AND ALBUTEROL SULFATE 2.5; .5 MG/3ML; MG/3ML
3 SOLUTION RESPIRATORY (INHALATION) ONCE
Status: COMPLETED | OUTPATIENT
Start: 2025-01-02 | End: 2025-01-02

## 2025-01-02 RX ORDER — ACETYLCYSTEINE 200 MG/ML
5 SOLUTION ORAL; RESPIRATORY (INHALATION) ONCE
Status: COMPLETED | OUTPATIENT
Start: 2025-01-02 | End: 2025-01-02

## 2025-01-02 RX ORDER — OSELTAMIVIR PHOSPHATE 75 MG/1
75 CAPSULE ORAL 2 TIMES DAILY
Qty: 10 CAPSULE | Refills: 0 | Status: SHIPPED | OUTPATIENT
Start: 2025-01-02 | End: 2025-01-08 | Stop reason: HOSPADM

## 2025-01-02 RX ORDER — DEXAMETHASONE SODIUM PHOSPHATE 10 MG/ML
10 INJECTION, SOLUTION INTRAMUSCULAR; INTRAVENOUS ONCE
Status: COMPLETED | OUTPATIENT
Start: 2025-01-02 | End: 2025-01-02

## 2025-01-02 RX ADMIN — ACETYLCYSTEINE 5 ML: 200 SOLUTION ORAL; RESPIRATORY (INHALATION) at 22:32

## 2025-01-02 RX ADMIN — IPRATROPIUM BROMIDE AND ALBUTEROL SULFATE 3 ML: .5; 3 SOLUTION RESPIRATORY (INHALATION) at 22:10

## 2025-01-02 RX ADMIN — DEXAMETHASONE SODIUM PHOSPHATE 10 MG: 10 INJECTION, SOLUTION INTRAMUSCULAR; INTRAVENOUS at 22:11

## 2025-01-03 NOTE — DISCHARGE INSTRUCTIONS
Today you are positive for influenza A.  We are going to start Tamiflu in the morning.  Will be twice daily for 5 days    Will also be important to control your fever with influenza utilizing either Tylenol or ibuprofen every 6 hours.  You may actually use these together as needed    Additionally we are going to continue steroids in the form of prednisone.  I also sent in a box of albuterol nebulizers to use in your machine every 4-6 hours as needed for wheezing    Please read all of the instructions in this handout.  If you receive prescriptions please fill them and take them as directed.  Please call your primary care physician for follow-up appointment in the next 5 to 7 days.  If you do not have a physician you may call the [facility] referral line at [phone].    You may return to the emergency department at any time for any concerns such as worsening symptoms.  If you received a work or school note it will be printed at the back of this packet.

## 2025-01-03 NOTE — FSED PROVIDER NOTE
EMERGENCY DEPARTMENT ENCOUNTER    Room Number:  05/05  Date seen:  1/2/2025  Time seen: 23:13 EST  PCP: Julia Knapp APRN  Historian:     Discussed/obtained information from independent historians:     HPI:    The patient is a 49-year-old male.  He does have a history of respiratory failure in the past with tracheostomy in place.  This has been in place for approximately 5 to 7 years.  He presents today with a 2 to 3-day history of nasal congestion, dry cough, increased wheezing.  He is having difficulty clearing his lungs.  No documented fevers at home.  No chest pain.  No known sick contacts.  He does have a nebulizer machine but does not have any albuterol at this time    External (non-ED) record review:        Chronic or social conditions impacting care:    ALLERGIES  Quetiapine, Haloperidol, Amlodipine-valsartan-hctz, Aripiprazole, Olanzapine, Risperidone, Sulfa antibiotics, Sulfamethoxazole-trimethoprim, Valproic acid, Amlodipine, and Bupropion    PAST MEDICAL HISTORY  Active Ambulatory Problems     Diagnosis Date Noted    Abnormal liver enzymes 05/03/2016    Primary hypertriglyceridemia 05/03/2016    Gout 05/03/2016    Adiposity 05/03/2016    Bipolar I disorder 05/03/2016    Vitamin D deficiency 08/23/2016    IFG (impaired fasting glucose) 08/23/2016    Chest pain 11/05/2021    Acute pericarditis 11/24/2021    Focal dystonia 07/05/2018    History of pericarditis 11/03/2024    Essential hypertension 11/03/2024    Obesity (BMI 30-39.9) 11/03/2024    Dyspnea 11/03/2024    Tracheostomy status 11/03/2024     Resolved Ambulatory Problems     Diagnosis Date Noted    No Resolved Ambulatory Problems     Past Medical History:   Diagnosis Date    Abnormal serum thyroxine (T4) level     Bipolar affective     Depression 1992    Disorder of vocal cord     Elevated liver enzymes     History of medical problems 1992    Hypertension        PAST SURGICAL HISTORY  Past Surgical History:   Procedure Laterality Date     TRACHEOSTOMY         FAMILY HISTORY  Family History   Problem Relation Age of Onset    Hypertension Mother     Arthritis Mother     Diabetes Mother     Kidney disease Mother     Hypertension Father     Anxiety disorder Other     Bipolar disorder Other        SOCIAL HISTORY  Social History     Socioeconomic History    Marital status: Single   Tobacco Use    Smoking status: Never    Smokeless tobacco: Never   Vaping Use    Vaping status: Never Used   Substance and Sexual Activity    Alcohol use: Not Currently     Comment: social    Drug use: No    Sexual activity: Not Currently     Partners: Male       REVIEW OF SYSTEMS  Review of Systems    All systems reviewed and negative except for those discussed in HPI.       PHYSICAL EXAM    I have reviewed the triage vital signs and nursing notes.  Vitals:    01/02/25 2230   BP: 136/91   Pulse: 86   Resp: 18   Temp:    SpO2: 93%     Physical Exam  Vital signs: Reviewed in nurses notes    General: Awake alert mild respiratory distress    HEENT: Normocephalic.  Atraumatic.  Pupils are equal round sponsored to light.  Oropharynx is dry.    Neck:   Anterior neck reveals tracheostomy in place with no evidence of surrounding cellulitis    Respiratory:   Decreased breath sounds in all lung fields with expiratory wheezes noted    Cardiovascular: Regular rate and rhythm.    Abdomen: Nondistended    Skin:   Warm and dry.  No rashes noted    Neurological examination: Patient is awake alert oriented x 4.  Speech is soft secondary to tracheostomy but is normal.  Nonfocal    LAB RESULTS  Recent Results (from the past 24 hours)   COVID-19 and FLU A/B PCR, 1 HR TAT - Swab, Nasopharynx    Collection Time: 01/02/25  9:17 PM    Specimen: Nasopharynx; Swab   Result Value Ref Range    COVID19 Not Detected Not Detected - Ref. Range    Influenza A PCR Detected (A) Not Detected    Influenza B PCR Not Detected Not Detected   RSV PCR - Swab, Nasopharynx    Collection Time: 01/02/25  9:17 PM     Specimen: Nasopharynx; Swab   Result Value Ref Range    RSV, PCR Not Detected Not Detected       Ordered the above labs and independently interpreted results.  My findings will be discussed in the ED course or medical decision making section below      PROCEDURES:  Procedures      RADIOLOGY RESULTS  XR Chest 1 View    Result Date: 1/2/2025  XR CHEST 1 VW-1/2/2025  HISTORY: Cough, shortness of breath.  Heart size is mildly enlarged. Lungs are underinflated with some vascular crowding. Tracheostomy tube is seen in good position. No pneumothorax is seen.      1. Underinflation of the lungs. No acute infiltrates are seen. 2. There is mild cardiomegaly.   This report was finalized on 1/2/2025 10:18 PM by Dr. Marcin Costa M.D on Workstation: [employer]        Ordered the above noted radiological studies.  Independently interpreted by me.  My findings will be discussed in the medical decision section below.     PROGRESS, DATA ANALYSIS, CONSULTS AND MEDICAL DECISION MAKING    Please note that this section constitutes my independent interpretation of clinical data including lab results, radiology, EKG's.  This constitutes my independent professional opinion regarding differential diagnosis and management of this patient.  It may include any factors such as history from outside sources, review of external records, social determinants of health, management of medications, response to those treatments, and discussions with other providers.    ED Course as of 01/02/25 2315   Thu Jan 02, 2025   2305 Repeat exam at this time finds increased breath sounds throughout.    Plan: Will initiate Tamiflu for 5 days tomorrow, short course of prednisone, will give patient albuterol nebulizers for his nebulizer machine at home. [TC]      ED Course User Index  [TC] Marcin Sawyer MD     Orders placed during this visit:  Orders Placed This Encounter   Procedures    COVID-19 and FLU A/B PCR, 1 HR TAT - Swab, Nasopharynx    RSV PCR -  Swab, Nasopharynx    XR Chest 1 View       Medications   ipratropium-albuterol (DUO-NEB) nebulizer solution 3 mL (3 mL Nebulization Given 1/2/25 2210)   acetylcysteine (MUCOMYST) 20 % nebulizer solution 5 mL (5 mL Nebulization Given 1/2/25 2232)   dexAMETHasone sodium phosphate injection 10 mg (10 mg Intramuscular Given 1/2/25 2211)            Medical Decision Making          DIAGNOSIS  Final diagnoses:   Influenza A   Bronchospasm          Medication List        New Prescriptions      albuterol (2.5 MG/3ML) 0.083% nebulizer solution  Commonly known as: PROVENTIL  Take 2.5 mg by nebulization Every 4 (Four) Hours As Needed for Wheezing.     oseltamivir 75 MG capsule  Commonly known as: TAMIFLU  Take 1 capsule by mouth 2 (Two) Times a Day for 5 days.     predniSONE 20 MG tablet  Commonly known as: DELTASONE  3 po daily x 1d, then 2 po daily x 2d, then 1 po daily x 2d.            Changed      ascorbic acid 500 MG tablet  Commonly known as: VITAMIN C  What changed:   when to take this  reasons to take this               Where to Get Your Medications        These medications were sent to [facility] [address] [place] [address] [address] [facility] - [phone]  - [phone] [place] [id number]      Phone: [phone]   albuterol (2.5 MG/3ML) 0.083% nebulizer solution  oseltamivir 75 MG capsule  predniSONE 20 MG tablet         FOLLOW-UP  Julia Knapp, JUANITA  [address]  [place] [id number] [phone]      3-5 days        Latest Documented Vital Signs:  As of 23:15 EST  BP- 136/91 HR- 86 Temp- 98.3 °F (36.8 °C) O2 sat- 93%    Appropriate PPE utilized throughout this patient encounter to include mask, if indicated, per current protocol. Hand hygiene was performed before donning PPE and after removal when leaving the room.    Please note that portions of this were completed with a voice recognition program.     Note Disclaimer: At [facility], we believe that  sharing information builds trust and better relationships. You are receiving this note because you are receiving care at [facility] or recently visited. It is possible you will see health information before a provider has talked with you about it. This kind of information can be easy to misunderstand. To help you fully understand what it means for your health, we urge you to discuss this note with your provider.

## 2025-01-04 ENCOUNTER — APPOINTMENT (OUTPATIENT)
Dept: GENERAL RADIOLOGY | Facility: HOSPITAL | Age: 50
End: 2025-01-04
Payer: COMMERCIAL

## 2025-01-04 ENCOUNTER — APPOINTMENT (OUTPATIENT)
Dept: CT IMAGING | Facility: HOSPITAL | Age: 50
End: 2025-01-04
Payer: COMMERCIAL

## 2025-01-04 ENCOUNTER — NURSE TRIAGE (OUTPATIENT)
Dept: CALL CENTER | Facility: HOSPITAL | Age: 50
End: 2025-01-04
Payer: COMMERCIAL

## 2025-01-04 ENCOUNTER — HOSPITAL ENCOUNTER (INPATIENT)
Facility: HOSPITAL | Age: 50
LOS: 4 days | Discharge: HOME HEALTH | End: 2025-01-08
Attending: EMERGENCY MEDICINE | Admitting: STUDENT IN AN ORGANIZED HEALTH CARE EDUCATION/TRAINING PROGRAM
Payer: COMMERCIAL

## 2025-01-04 DIAGNOSIS — J38.7 LARYNGEAL MASS: Primary | ICD-10-CM

## 2025-01-04 DIAGNOSIS — J98.09 BRONCHIAL OBSTRUCTION: ICD-10-CM

## 2025-01-04 DIAGNOSIS — R74.01 TRANSAMINITIS: ICD-10-CM

## 2025-01-04 DIAGNOSIS — E86.0 DEHYDRATION: ICD-10-CM

## 2025-01-04 DIAGNOSIS — Z93.0 TRACHEOSTOMY STATUS: ICD-10-CM

## 2025-01-04 DIAGNOSIS — K76.0 FATTY LIVER DISEASE, NONALCOHOLIC: ICD-10-CM

## 2025-01-04 DIAGNOSIS — J10.1 INFLUENZA A: ICD-10-CM

## 2025-01-04 PROBLEM — J39.8 TRACHEAL MASS: Status: ACTIVE | Noted: 2025-01-04

## 2025-01-04 PROBLEM — N17.9 AKI (ACUTE KIDNEY INJURY): Status: ACTIVE | Noted: 2025-01-04

## 2025-01-04 LAB
ALBUMIN SERPL-MCNC: 3.9 G/DL (ref 3.5–5.2)
ALBUMIN/GLOB SERPL: 1.1 G/DL
ALP SERPL-CCNC: 79 U/L (ref 39–117)
ALT SERPL W P-5'-P-CCNC: 107 U/L (ref 1–41)
ANION GAP SERPL CALCULATED.3IONS-SCNC: 10.3 MMOL/L (ref 5–15)
AST SERPL-CCNC: 62 U/L (ref 1–40)
BASOPHILS # BLD MANUAL: 0 10*3/MM3 (ref 0–0.2)
BASOPHILS NFR BLD MANUAL: 0 % (ref 0–1.5)
BILIRUB SERPL-MCNC: 0.7 MG/DL (ref 0–1.2)
BUN SERPL-MCNC: 42 MG/DL (ref 6–20)
BUN/CREAT SERPL: 27.1 (ref 7–25)
CALCIUM SPEC-SCNC: 9.8 MG/DL (ref 8.6–10.5)
CHLORIDE SERPL-SCNC: 108 MMOL/L (ref 98–107)
CO2 SERPL-SCNC: 15.7 MMOL/L (ref 22–29)
CREAT SERPL-MCNC: 1.55 MG/DL (ref 0.76–1.27)
DEPRECATED RDW RBC AUTO: 41.7 FL (ref 37–54)
EGFRCR SERPLBLD CKD-EPI 2021: 54.9 ML/MIN/1.73
EOSINOPHIL # BLD MANUAL: 0 10*3/MM3 (ref 0–0.4)
EOSINOPHIL NFR BLD MANUAL: 0 % (ref 0.3–6.2)
ERYTHROCYTE [DISTWIDTH] IN BLOOD BY AUTOMATED COUNT: 13 % (ref 12.3–15.4)
GLOBULIN UR ELPH-MCNC: 3.4 GM/DL
GLUCOSE SERPL-MCNC: 124 MG/DL (ref 65–99)
HCT VFR BLD AUTO: 42 % (ref 37.5–51)
HGB BLD-MCNC: 14.6 G/DL (ref 13–17.7)
LYMPHOCYTES # BLD MANUAL: 0.54 10*3/MM3 (ref 0.7–3.1)
LYMPHOCYTES NFR BLD MANUAL: 4 % (ref 5–12)
MCH RBC QN AUTO: 30.7 PG (ref 26.6–33)
MCHC RBC AUTO-ENTMCNC: 34.8 G/DL (ref 31.5–35.7)
MCV RBC AUTO: 88.2 FL (ref 79–97)
MONOCYTES # BLD: 0.54 10*3/MM3 (ref 0.1–0.9)
NEUTROPHILS # BLD AUTO: 12.41 10*3/MM3 (ref 1.7–7)
NEUTROPHILS NFR BLD MANUAL: 92 % (ref 42.7–76)
NRBC BLD AUTO-RTO: 0 /100 WBC (ref 0–0.2)
PLAT MORPH BLD: NORMAL
PLATELET # BLD AUTO: 305 10*3/MM3 (ref 140–450)
PMV BLD AUTO: 9.3 FL (ref 6–12)
POTASSIUM SERPL-SCNC: 4.4 MMOL/L (ref 3.5–5.2)
PROT SERPL-MCNC: 7.3 G/DL (ref 6–8.5)
RBC # BLD AUTO: 4.76 10*6/MM3 (ref 4.14–5.8)
RBC MORPH BLD: NORMAL
SODIUM SERPL-SCNC: 134 MMOL/L (ref 136–145)
VARIANT LYMPHS NFR BLD MANUAL: 4 % (ref 19.6–45.3)
WBC MORPH BLD: NORMAL
WBC NRBC COR # BLD AUTO: 13.49 10*3/MM3 (ref 3.4–10.8)

## 2025-01-04 PROCEDURE — 80053 COMPREHEN METABOLIC PANEL: CPT | Performed by: EMERGENCY MEDICINE

## 2025-01-04 PROCEDURE — 25010000002 FENTANYL CITRATE (PF) 50 MCG/ML SOLUTION: Performed by: STUDENT IN AN ORGANIZED HEALTH CARE EDUCATION/TRAINING PROGRAM

## 2025-01-04 PROCEDURE — 99285 EMERGENCY DEPT VISIT HI MDM: CPT

## 2025-01-04 PROCEDURE — 36415 COLL VENOUS BLD VENIPUNCTURE: CPT

## 2025-01-04 PROCEDURE — 94760 N-INVAS EAR/PLS OXIMETRY 1: CPT

## 2025-01-04 PROCEDURE — 85007 BL SMEAR W/DIFF WBC COUNT: CPT | Performed by: EMERGENCY MEDICINE

## 2025-01-04 PROCEDURE — 94761 N-INVAS EAR/PLS OXIMETRY MLT: CPT

## 2025-01-04 PROCEDURE — 85025 COMPLETE CBC W/AUTO DIFF WBC: CPT | Performed by: EMERGENCY MEDICINE

## 2025-01-04 PROCEDURE — 71045 X-RAY EXAM CHEST 1 VIEW: CPT

## 2025-01-04 PROCEDURE — 94799 UNLISTED PULMONARY SVC/PX: CPT

## 2025-01-04 PROCEDURE — 25810000003 SODIUM CHLORIDE 0.9 % SOLUTION: Performed by: EMERGENCY MEDICINE

## 2025-01-04 PROCEDURE — 25010000002 MIDAZOLAM PER 1 MG: Performed by: STUDENT IN AN ORGANIZED HEALTH CARE EDUCATION/TRAINING PROGRAM

## 2025-01-04 RX ORDER — POLYETHYLENE GLYCOL 3350 17 G/17G
17 POWDER, FOR SOLUTION ORAL DAILY PRN
Status: DISCONTINUED | OUTPATIENT
Start: 2025-01-04 | End: 2025-01-08 | Stop reason: HOSPADM

## 2025-01-04 RX ORDER — ONDANSETRON 2 MG/ML
4 INJECTION INTRAMUSCULAR; INTRAVENOUS EVERY 6 HOURS PRN
Status: DISCONTINUED | OUTPATIENT
Start: 2025-01-04 | End: 2025-01-08 | Stop reason: HOSPADM

## 2025-01-04 RX ORDER — MIDAZOLAM HYDROCHLORIDE 1 MG/ML
INJECTION, SOLUTION INTRAMUSCULAR; INTRAVENOUS
Status: DISCONTINUED
Start: 2025-01-04 | End: 2025-01-04 | Stop reason: WASHOUT

## 2025-01-04 RX ORDER — NITROGLYCERIN 0.4 MG/1
0.4 TABLET SUBLINGUAL
Status: DISCONTINUED | OUTPATIENT
Start: 2025-01-04 | End: 2025-01-08 | Stop reason: HOSPADM

## 2025-01-04 RX ORDER — MIDAZOLAM HYDROCHLORIDE 1 MG/ML
2 INJECTION, SOLUTION INTRAMUSCULAR; INTRAVENOUS ONCE
Status: COMPLETED | OUTPATIENT
Start: 2025-01-04 | End: 2025-01-04

## 2025-01-04 RX ORDER — BISACODYL 10 MG
10 SUPPOSITORY, RECTAL RECTAL DAILY PRN
Status: DISCONTINUED | OUTPATIENT
Start: 2025-01-04 | End: 2025-01-08 | Stop reason: HOSPADM

## 2025-01-04 RX ORDER — FENTANYL CITRATE 50 UG/ML
INJECTION, SOLUTION INTRAMUSCULAR; INTRAVENOUS
Status: DISCONTINUED
Start: 2025-01-04 | End: 2025-01-04 | Stop reason: WASHOUT

## 2025-01-04 RX ORDER — ACETAMINOPHEN 325 MG/1
650 TABLET ORAL EVERY 4 HOURS PRN
Status: DISCONTINUED | OUTPATIENT
Start: 2025-01-04 | End: 2025-01-08 | Stop reason: HOSPADM

## 2025-01-04 RX ORDER — SODIUM CHLORIDE 0.9 % (FLUSH) 0.9 %
10 SYRINGE (ML) INJECTION EVERY 12 HOURS SCHEDULED
Status: DISCONTINUED | OUTPATIENT
Start: 2025-01-04 | End: 2025-01-08 | Stop reason: HOSPADM

## 2025-01-04 RX ORDER — SODIUM CHLORIDE 9 MG/ML
40 INJECTION, SOLUTION INTRAVENOUS AS NEEDED
Status: DISCONTINUED | OUTPATIENT
Start: 2025-01-04 | End: 2025-01-08 | Stop reason: HOSPADM

## 2025-01-04 RX ORDER — BISACODYL 5 MG/1
5 TABLET, DELAYED RELEASE ORAL DAILY PRN
Status: DISCONTINUED | OUTPATIENT
Start: 2025-01-04 | End: 2025-01-08 | Stop reason: HOSPADM

## 2025-01-04 RX ORDER — ACETAMINOPHEN 160 MG/5ML
650 SOLUTION ORAL EVERY 4 HOURS PRN
Status: DISCONTINUED | OUTPATIENT
Start: 2025-01-04 | End: 2025-01-08 | Stop reason: HOSPADM

## 2025-01-04 RX ORDER — CALCIUM CARBONATE 500 MG/1
2 TABLET, CHEWABLE ORAL 2 TIMES DAILY PRN
Status: DISCONTINUED | OUTPATIENT
Start: 2025-01-04 | End: 2025-01-08 | Stop reason: HOSPADM

## 2025-01-04 RX ORDER — MIDAZOLAM HYDROCHLORIDE 1 MG/ML
3 INJECTION, SOLUTION INTRAMUSCULAR; INTRAVENOUS ONCE
Status: DISCONTINUED | OUTPATIENT
Start: 2025-01-04 | End: 2025-01-04

## 2025-01-04 RX ORDER — AMOXICILLIN 250 MG
2 CAPSULE ORAL 2 TIMES DAILY PRN
Status: DISCONTINUED | OUTPATIENT
Start: 2025-01-04 | End: 2025-01-08 | Stop reason: HOSPADM

## 2025-01-04 RX ORDER — ACETAMINOPHEN 650 MG/1
650 SUPPOSITORY RECTAL EVERY 4 HOURS PRN
Status: DISCONTINUED | OUTPATIENT
Start: 2025-01-04 | End: 2025-01-08 | Stop reason: HOSPADM

## 2025-01-04 RX ORDER — FENTANYL CITRATE 50 UG/ML
50 INJECTION, SOLUTION INTRAMUSCULAR; INTRAVENOUS ONCE
Status: DISCONTINUED | OUTPATIENT
Start: 2025-01-04 | End: 2025-01-04

## 2025-01-04 RX ORDER — SODIUM CHLORIDE 0.9 % (FLUSH) 0.9 %
10 SYRINGE (ML) INJECTION AS NEEDED
Status: DISCONTINUED | OUTPATIENT
Start: 2025-01-04 | End: 2025-01-08 | Stop reason: HOSPADM

## 2025-01-04 RX ORDER — SODIUM CHLORIDE 9 MG/ML
75 INJECTION, SOLUTION INTRAVENOUS CONTINUOUS
Status: ACTIVE | OUTPATIENT
Start: 2025-01-04 | End: 2025-01-05

## 2025-01-04 RX ORDER — FENTANYL CITRATE 50 UG/ML
50 INJECTION, SOLUTION INTRAMUSCULAR; INTRAVENOUS ONCE
Status: COMPLETED | OUTPATIENT
Start: 2025-01-04 | End: 2025-01-04

## 2025-01-04 RX ORDER — ONDANSETRON 4 MG/1
4 TABLET, ORALLY DISINTEGRATING ORAL EVERY 6 HOURS PRN
Status: DISCONTINUED | OUTPATIENT
Start: 2025-01-04 | End: 2025-01-08 | Stop reason: HOSPADM

## 2025-01-04 RX ADMIN — MIDAZOLAM 2 MG: 1 INJECTION INTRAMUSCULAR; INTRAVENOUS at 21:22

## 2025-01-04 RX ADMIN — SODIUM CHLORIDE 1000 ML: 9 INJECTION, SOLUTION INTRAVENOUS at 21:20

## 2025-01-04 RX ADMIN — FENTANYL CITRATE 50 MCG: 50 INJECTION, SOLUTION INTRAMUSCULAR; INTRAVENOUS at 21:22

## 2025-01-04 NOTE — ED PROVIDER NOTES
EMERGENCY DEPARTMENT ENCOUNTER  Room Number:  30/30  PCP: Julia Knapp APRN  Independent Historians: Patient      HPI:  Chief Complaint: had concerns including Shortness of Breath.     A complete HPI/ROS/PMH/PSH/SH/FH are unobtainable due to:   Chronic or social conditions impacting patient care (Social Determinants of Health):       Context: Wang Tee is a 49 y.o. male with a medical history of hypertension, bipolar disorder, obesity, anxiety who presents to the ED c/o acute trouble breathing.  Patient was recent diagnosed with influenza A.  He feels like he has some blockages in his trachea.  Patient does have history of chronic tracheostomy secondary to vocal cord dysfunction.  He has had some cough and upper airway drainages.  He feels like he has some mucous plugging and has trouble getting it up.  Denies recent fever.  Denies chest pain.      Review of prior external notes (non-ED) -and- Review of prior external test results outside of this encounter:     I reviewed prior medical records note patient was hospitalized in November of this year with chest pain.  Chronic conditions include hypertension, bipolar disorder and anxiety.    Prescription drug monitoring program review:         PAST MEDICAL HISTORY  Active Ambulatory Problems     Diagnosis Date Noted    Abnormal liver enzymes 05/03/2016    Primary hypertriglyceridemia 05/03/2016    Gout 05/03/2016    Adiposity 05/03/2016    Bipolar I disorder 05/03/2016    Vitamin D deficiency 08/23/2016    IFG (impaired fasting glucose) 08/23/2016    Chest pain 11/05/2021    Acute pericarditis 11/24/2021    Focal dystonia 07/05/2018    History of pericarditis 11/03/2024    Essential hypertension 11/03/2024    Obesity (BMI 30-39.9) 11/03/2024    Dyspnea 11/03/2024    Tracheostomy status 11/03/2024     Resolved Ambulatory Problems     Diagnosis Date Noted    No Resolved Ambulatory Problems     Past Medical History:   Diagnosis Date    Abnormal serum thyroxine  (T4) level     Bipolar affective     Depression 1992    Disorder of vocal cord     Elevated liver enzymes     History of medical problems 1992    Hypertension          PAST SURGICAL HISTORY  Past Surgical History:   Procedure Laterality Date    TRACHEOSTOMY           FAMILY HISTORY  Family History   Problem Relation Age of Onset    Hypertension Mother     Arthritis Mother     Diabetes Mother     Kidney disease Mother     Hypertension Father     Anxiety disorder Other     Bipolar disorder Other          SOCIAL HISTORY  Social History     Socioeconomic History    Marital status: Single   Tobacco Use    Smoking status: Never    Smokeless tobacco: Never   Vaping Use    Vaping status: Never Used   Substance and Sexual Activity    Alcohol use: Not Currently     Comment: social    Drug use: No    Sexual activity: Not Currently     Partners: Male         ALLERGIES  Quetiapine, Haloperidol, Amlodipine-valsartan-hctz, Aripiprazole, Olanzapine, Risperidone, Sulfa antibiotics, Sulfamethoxazole-trimethoprim, Valproic acid, Amlodipine, and Bupropion      REVIEW OF SYSTEMS  Review of Systems   Constitutional:  Negative for fever.   HENT:  Positive for congestion.    Respiratory:  Positive for cough. Negative for shortness of breath.    Cardiovascular:  Negative for chest pain.   All other systems reviewed and are negative.    Included in HPI  All systems reviewed and negative except for those discussed in HPI.      PHYSICAL EXAM    I have reviewed the triage vital signs and nursing notes.    ED Triage Vitals   Temp Heart Rate Resp BP SpO2   01/04/25 1804 01/04/25 1809 01/04/25 1804 01/04/25 1813 01/04/25 1807   97.7 °F (36.5 °C) 71 26 130/80 95 %      Temp src Heart Rate Source Patient Position BP Location FiO2 (%)   -- -- 01/04/25 1813 01/04/25 1813 --     Lying Right arm        Physical Exam  GENERAL: Alert well-appearing male no obvious distress.  Triage vitals reviewed notable for temperature 97.7.  Heart rate 71, blood  pressure 130/80.  O2 sats 95% on room air  SKIN: Warm, dry  HENT: Normocephalic, atraumatic-tracheostomy is present in the mid neck.  There is no stridor.  EYES: no scleral icterus  CV: regular rhythm, regular rate  RESPIRATORY: normal effort, lungs clear-O2 sats mid 90s room air  ABDOMEN: soft, nontender, nondistended  MUSCULOSKELETAL: no deformity  NEURO: alert, moves all extremities, follows commands      LAB RESULTS  Recent Results (from the past 24 hours)   Comprehensive Metabolic Panel    Collection Time: 01/04/25  7:05 PM    Specimen: Blood   Result Value Ref Range    Glucose 124 (H) 65 - 99 mg/dL    BUN 42 (H) 6 - 20 mg/dL    Creatinine 1.55 (H) 0.76 - 1.27 mg/dL    Sodium 134 (L) 136 - 145 mmol/L    Potassium 4.4 3.5 - 5.2 mmol/L    Chloride 108 (H) 98 - 107 mmol/L    CO2 15.7 (L) 22.0 - 29.0 mmol/L    Calcium 9.8 8.6 - 10.5 mg/dL    Total Protein 7.3 6.0 - 8.5 g/dL    Albumin 3.9 3.5 - 5.2 g/dL    ALT (SGPT) 107 (H) 1 - 41 U/L    AST (SGOT) 62 (H) 1 - 40 U/L    Alkaline Phosphatase 79 39 - 117 U/L    Total Bilirubin 0.7 0.0 - 1.2 mg/dL    Globulin 3.4 gm/dL    A/G Ratio 1.1 g/dL    BUN/Creatinine Ratio 27.1 (H) 7.0 - 25.0    Anion Gap 10.3 5.0 - 15.0 mmol/L    eGFR 54.9 (L) >60.0 mL/min/1.73   CBC Auto Differential    Collection Time: 01/04/25  7:05 PM    Specimen: Blood   Result Value Ref Range    WBC 13.49 (H) 3.40 - 10.80 10*3/mm3    RBC 4.76 4.14 - 5.80 10*6/mm3    Hemoglobin 14.6 13.0 - 17.7 g/dL    Hematocrit 42.0 37.5 - 51.0 %    MCV 88.2 79.0 - 97.0 fL    MCH 30.7 26.6 - 33.0 pg    MCHC 34.8 31.5 - 35.7 g/dL    RDW 13.0 12.3 - 15.4 %    RDW-SD 41.7 37.0 - 54.0 fl    MPV 9.3 6.0 - 12.0 fL    Platelets 305 140 - 450 10*3/mm3    nRBC 0.0 0.0 - 0.2 /100 WBC   Manual Differential    Collection Time: 01/04/25  7:05 PM    Specimen: Blood   Result Value Ref Range    Neutrophil % 92.0 (H) 42.7 - 76.0 %    Lymphocyte % 4.0 (L) 19.6 - 45.3 %    Monocyte % 4.0 (L) 5.0 - 12.0 %    Eosinophil % 0.0 (L) 0.3 - 6.2  %    Basophil % 0.0 0.0 - 1.5 %    Neutrophils Absolute 12.41 (H) 1.70 - 7.00 10*3/mm3    Lymphocytes Absolute 0.54 (L) 0.70 - 3.10 10*3/mm3    Monocytes Absolute 0.54 0.10 - 0.90 10*3/mm3    Eosinophils Absolute 0.00 0.00 - 0.40 10*3/mm3    Basophils Absolute 0.00 0.00 - 0.20 10*3/mm3    RBC Morphology Normal Normal    WBC Morphology Normal Normal    Platelet Morphology Normal Normal         RADIOLOGY  XR Chest 1 View    Result Date: 1/4/2025  XR CHEST 1 VW-  CLINICAL HISTORY:  Cough, influenza A  FINDINGS:  The lung volumes are low. A tracheostomy tube is again noted. The cardiomediastinal silhouette is prominent size and stable when compared to the prior study dated 1/2/2020. This is likely accentuated by the low lung volumes. The perihilar markings are prominent and I cannot entirely exclude the possibility of a perihilar infiltrate. Otherwise, there is no convincing evidence for an acute infiltrate.    This report was finalized on 1/4/2025 7:51 PM by Dr. Gordon Meng M.D on Workstation: [id number]         MEDICATIONS GIVEN IN ER  Medications   sodium chloride 0.9 % bolus 1,000 mL (1,000 mL Intravenous New Bag 1/4/25 2120)   midazolam (VERSED) injection 2 mg (2 mg Intravenous Given 1/4/25 2122)   fentaNYL citrate (PF) (SUBLIMAZE) injection 50 mcg (50 mcg Intravenous Given 1/4/25 2122)         ORDERS PLACED DURING THIS VISIT:  Orders Placed This Encounter   Procedures    XR Chest 1 View    CT Chest Without Contrast Diagnostic    Comprehensive Metabolic Panel    CBC Auto Differential    Manual Differential    NPO Diet NPO Type: Strict NPO    Diet: Regular/House; Fluid Consistency: Thin (IDDSI 0)    Pulmonology (on-call MD unless specified)    [facility] (on-call MD unless specified) Details    Oxygen Therapy- Trach Mask - Humidified; FiO2; Titrate FiO2 Per SpO2; 90 - 95%    Inpatient Admission    CBC & Differential         OUTPATIENT MEDICATION MANAGEMENT:  No current Epic-ordered facility-administered medications on  file.     Current Outpatient Medications Ordered in Epic   Medication Sig Dispense Refill    albuterol (PROVENTIL) (2.5 MG/3ML) 0.083% nebulizer solution Take 2.5 mg by nebulization Every 4 (Four) Hours As Needed for Wheezing. 90 each 0    allopurinol (ZYLOPRIM) 300 MG tablet Take 1 tablet by mouth Daily. 90 tablet 3    ascorbic acid (VITAMIN C) 500 MG tablet Take 1 tablet by mouth Daily. (Patient taking differently: Take 1 tablet by mouth Daily As Needed (causes gout pain).)      cholecalciferol (VITAMIN D3) 10 MCG (400 UNIT) tablet Take  by mouth Daily.      clonazePAM (KlonoPIN) 0.5 MG tablet Take 1 tablet by mouth At Night As Needed.      co-enzyme Q-10 30 MG capsule Take  by mouth Daily.      colchicine 0.6 MG tablet Take 1 tablet by mouth 2 (Two) Times a Day. 60 tablet 2    cyclobenzaprine (FLEXERIL) 10 MG tablet Take 1 tablet by mouth 3 (Three) Times a Day As Needed. for muscle spams      doxycycline (MONODOX) 100 MG capsule Take 1 capsule by mouth 2 (Two) Times a Day. 14 capsule 0    guaiFENesin (MUCINEX) 600 MG 12 hr tablet Take 2 tablets by mouth 2 (Two) Times a Day. 30 tablet 0    hydrOXYzine (ATARAX) 25 MG tablet Take 1 tablet by mouth Daily As Needed for Anxiety.      lithium 600 MG capsule Take 1 capsule by mouth 2 (Two) Times a Day. 600 PM  0    loperamide (IMODIUM) 2 MG capsule Take 1 capsule by mouth 4 (Four) Times a Day As Needed for Diarrhea. 20 capsule 0    losartan (COZAAR) 100 MG tablet Take 1 tablet by mouth Daily. 90 tablet 2    Melatonin 10 MG tablet Take 1 tablet by mouth At Night As Needed.      methylPREDNISolone (MEDROL) 4 MG dose pack Take as directed on package instructions. 21 tablet 0    multivitamin with minerals tablet tablet Take 1 tablet by mouth Daily.      mupirocin (BACTROBAN) 2 % ointment Apply 1 Application topically to the appropriate area as directed Daily. PRN      nebivolol (BYSTOLIC) 5 MG tablet Take 1 tablet by mouth once daily 30 tablet 0    oseltamivir (TAMIFLU) 75  MG capsule Take 1 capsule by mouth 2 (Two) Times a Day for 5 days. 10 capsule 0    predniSONE (DELTASONE) 20 MG tablet 3 po daily x 1d, then 2 po daily x 2d, then 1 po daily x 2d. 9 tablet 0    triamcinolone (KENALOG) 0.1 % cream Apply 1 Application topically to the appropriate area as directed 2 (Two) Times a Day.      Vraylar 1.5 MG capsule capsule Take 1 capsule by mouth Daily.           PROCEDURES  Procedures            PROGRESS, DATA ANALYSIS, CONSULTS, AND MEDICAL DECISION MAKING  All labs have been independently interpreted by me.  All radiology studies have been reviewed by me. All EKG's have been independently viewed and interpreted by me.  Discussion below represents my analysis of pertinent findings related to patient's condition, differential diagnosis, treatment plan and final disposition.    Differential diagnosis includes but is not limited to tracheal obstruction, bacterial tracheitis.  Viral pneumonia, influenza A      ED Course as of 01/04/25 2250   Sat Jan 04, 2025   1910 Chest x-ray independently interpreted by me shows some scattered densities, left greater than right [DB]   2016 Respiratory therapy came and tried to suction from the patient's trach.  There is no obstruction within the metal trach, but there was a small amount of bleeding around the airway and some firmness.  Respiratory therapist suspected there may be some possible tissue within the tracheal lumen. [DB]   2017 Unfortunately we no longer have ENT coverage at the hospital.  Will discuss findings and management with on-call pulmonary.  Patient may need endoscopy.  Fortunately he can talk and breathe without significant difficulties and this does not seem to be emergent at this time. [DB]   2049 Lab work does show some dehydration with elevated BUN and creatinine of 42 and 1.55.  Patient also somewhat acidotic with bicarb of 15.  Will go ahead and give a 1 L fluid bolus. [DB]   2115 I discussed evaluation and treatment of this  patient with Dr. Jm Araiza who will see at ED bedside. [DB]   2115 Dr. Farnsworth planning to do bedside bronchoscopy. [DB]   2144 Patient did undergo bedside bronchoscopy by Dr. Jm Araiza.  He did note a large mass within the tracheal lumen.  He does recommend admission for IV fluids and will take to the endoscopy suite for formal endoscopy probably tomorrow night.  He wants to order a CT scan of the chest and would like me to have medicine team admit this patient. [DB]   2246 I discussed treatment evaluation this patient with Isabelle from [facility] who admit on behalf of of Dr. Callum Berry [DB]      ED Course User Index  [DB] Chito Perkins MD             AS OF 22:50 EST VITALS:    BP - 128/86  HR - 59  TEMP - 97.7 °F (36.5 °C)  O2 SATS - 94%    COMPLEXITY OF CARE  49-year-old male with history of tracheostomy presents with upper respiratory congestion.  He feels that he has some blockage in his upper airway.    Please see ED course above my independent evaluation and interpretation of ED testing including x-ray and emergency department labs.  X-ray did not show any obvious pulmonary problems or pneumonia.  Labs did show evidence of dehydration with BUN/creatinine 42 and 1.55 and bicarb of 16.  Patient was treated with IV fluids.    Patient was seen in consultation by pulmonology, Dr. Damián Rodriguez who came down to the ED and was kind enough to perform bedside bronchoscopy.  This bronchoscopy did show intraluminal tracheal mass.  Mass was felt to be a solidification of secretions and was unable to be excised with bedside trach.  Plan to admit the patient to the hospital for IV fluids with goal to take him for formal bronchoscopy when available.    Dr. Araiza did request formal CT scan of the chest which has not been performed yet.      DIAGNOSIS  Final diagnoses:   Laryngeal mass   Tracheostomy status   Dehydration   Influenza A         DISPOSITION  ED Disposition       ED Disposition   Decision to Admit    Condition   --     Comment   Level of Care: Telemetry [5]   Diagnosis: Tracheal mass [438908]   Admitting Physician: HAYDEN PRITCHARD [[id number]]   Attending Physician: HAYDEN PRITCHARD [[id number]]   Certification: I Certify That Inpatient Hospital Services Are Medically Necessary For Greater Than 2 Midnights                  Please note that portions of this document were completed with a voice recognition program.    Note Disclaimer: At [facility], we believe that sharing information builds trust and better relationships. You are receiving this note because you recently visited [facility]. It is possible you will see health information before a provider has talked with you about it. This kind of information can be easy to misunderstand. To help you fully understand what it means for your health, we urge you to discuss this note with your provider.         Chito Perkins MD  01/04/25 9135

## 2025-01-04 NOTE — TELEPHONE ENCOUNTER
[facility] Flu regarding secretions too thick to suction and hardening. Has been giving Mucinex as ordered, advised to increase fluid intake and increase cleaning of trach.. Caller states has been doing that as well as cleaning trach every 2 hours and requests transfer to [facility] staff.    Call transferred per caller request.    Reason for Disposition   [1] Known COPD or other severe lung disease (i.e., bronchiectasis, cystic fibrosis, lung surgery) AND [2] worsening symptoms (i.e., increased sputum purulence or amount, increased breathing difficulty    Additional Information   Negative: SEVERE difficulty breathing (e.g., struggling for each breath, speaks in single words)   Negative: Bluish (or gray) lips or face now   Negative: [1] Difficulty breathing AND [2] exposure to flames, smoke, or fumes   Negative: [1] Stridor AND [2] difficulty breathing   Negative: Sounds like a life-threatening emergency to the triager   Negative: [1] Previous asthma attacks AND [2] this feels like asthma attack   Negative: Dry cough (non-productive;  no sputum or minimal clear sputum)   Negative: [1] MODERATE difficulty breathing (e.g., speaks in phrases, SOB even at rest, pulse 100-120) AND [2] still present when not coughing   Negative: Chest pain  (Exception: MILD central chest pain, present only when coughing.)   Negative: Patient sounds very sick or weak to the triager   Negative: [1] MILD difficulty breathing (e.g., minimal/no SOB at rest, SOB with walking, pulse <100) AND [2] still present when not coughing   Negative: [1] Coughed up blood AND [2] > 1 tablespoon (15 ml)   (Exception: Blood-tinged sputum.)   Negative: Fever > 103 F (39.4 C)   Negative: [1] Fever > 101 F (38.3 C) AND [2] age > 60 years   Negative: [1] Fever > 100.0 F (37.8 C) AND [2] bedridden (e.g., CVA, chronic illness, recovering from surgery)   Negative: [1] Fever > 100.0  "F (37.8 C) AND [2] diabetes mellitus or weak immune system (e.g., HIV positive, cancer chemo, splenectomy, organ transplant, chronic steroids)   Negative: Wheezing is present   Negative: SEVERE coughing spells (e.g., whooping sound after coughing, vomiting after coughing)   Negative: [1] Continuous (nonstop) coughing interferes with work or school AND [2] no improvement using cough treatment per Care Advice   Negative: Coughing up vale-colored (reddish-brown) sputum   Negative: Fever present > 3 days (72 hours)   Negative: [1] Fever returns after gone for over 24 hours AND [2] symptoms worse or not improved   Negative: [1] Using nasal washes and pain medicine > 24 hours AND [2] sinus pain (around cheekbone or eye) persists   Negative: Earache   Negative: Cough has been present for > 3 weeks   Negative: [1] Nasal discharge AND [2] present > 10 days   Negative: [1] Coughed up blood-tinged sputum AND [2] more than once   Negative: Exposure to TB (Tuberculosis)   Negative: Cough   Negative: Cough with cold symptoms (e.g., runny nose, postnasal drip, throat clearing)    Answer Assessment - Initial Assessment Questions  1. ONSET: \"When did the cough begin?\"       Yesterday  2. SEVERITY: \"How bad is the cough today?\"       Seen at ED, diagnosed with Type A influenza  3. SPUTUM: \"Describe the color of your sputum\" (none, dry cough; clear, white, yellow, green)      Thick yellow green  4. HEMOPTYSIS: \"Are you coughing up any blood?\" If so ask: \"How much?\" (flecks, streaks, tablespoons, etc.)      No  5. DIFFICULTY BREATHING: \"Are you having difficulty breathing?\" If Yes, ask: \"How bad is it?\" (e.g., mild, moderate, severe)     - MILD: No SOB at rest, mild SOB with walking, speaks normally in sentences, can lie down, no retractions, pulse < 100.     - MODERATE: SOB at rest, SOB with minimal exertion and prefers to sit, cannot lie down flat, speaks in phrases, mild retractions, audible wheezing, pulse 100-120.     - SEVERE: " "Very SOB at rest, speaks in single words, struggling to breathe, sitting hunched forward, retractions, pulse > 120       Denies  6. FEVER: \"Do you have a fever?\" If Yes, ask: \"What is your temperature, how was it measured, and when did it start?\"      Denies  7. CARDIAC HISTORY: \"Do you have any history of heart disease?\" (e.g., heart attack, congestive heart failure)       Not  8. LUNG HISTORY: \"Do you have any history of lung disease?\"  (e.g., pulmonary embolus, asthma, emphysema)      Has Tracheostomy  9. PE RISK FACTORS: \"Do you have a history of blood clots?\" (or: recent major surgery, recent prolonged travel, bedridden)        10. OTHER SYMPTOMS: \"Do you have any other symptoms?\" (e.g., runny nose, wheezing, chest pain)        Secretions are very thick and unable to suction and having to dlean trach every 2 hours to prevent hardening.  11. PREGNANCY: \"Is there any chance you are pregnant?\" \"When was your last menstrual period?\"        N/A  12. TRAVEL: \"Have you traveled out of the country in the last month?\" (e.g., travel history, exposures)        no    Protocols used: Cough - Acute Productive-ADULT-AH    "

## 2025-01-04 NOTE — ED NOTES
Pt to ER via [facility] from home for Soa- pt is a trach patient and has had trouble breathing for the past few days.

## 2025-01-05 ENCOUNTER — APPOINTMENT (OUTPATIENT)
Dept: CT IMAGING | Facility: HOSPITAL | Age: 50
End: 2025-01-05
Payer: COMMERCIAL

## 2025-01-05 ENCOUNTER — ANESTHESIA (OUTPATIENT)
Dept: GASTROENTEROLOGY | Facility: HOSPITAL | Age: 50
End: 2025-01-05
Payer: COMMERCIAL

## 2025-01-05 ENCOUNTER — ANESTHESIA EVENT (OUTPATIENT)
Dept: GASTROENTEROLOGY | Facility: HOSPITAL | Age: 50
End: 2025-01-05
Payer: COMMERCIAL

## 2025-01-05 PROBLEM — J98.09 BRONCHIAL OBSTRUCTION: Status: ACTIVE | Noted: 2025-01-04

## 2025-01-05 LAB
ANION GAP SERPL CALCULATED.3IONS-SCNC: 8 MMOL/L (ref 5–15)
BUN SERPL-MCNC: 37 MG/DL (ref 6–20)
BUN/CREAT SERPL: 27.6 (ref 7–25)
CALCIUM SPEC-SCNC: 9.3 MG/DL (ref 8.6–10.5)
CHLORIDE SERPL-SCNC: 109 MMOL/L (ref 98–107)
CO2 SERPL-SCNC: 18 MMOL/L (ref 22–29)
CREAT SERPL-MCNC: 1.34 MG/DL (ref 0.76–1.27)
D-LACTATE SERPL-SCNC: 1.1 MMOL/L (ref 0.5–2)
DEPRECATED RDW RBC AUTO: 45.2 FL (ref 37–54)
EGFRCR SERPLBLD CKD-EPI 2021: 65.3 ML/MIN/1.73
ERYTHROCYTE [DISTWIDTH] IN BLOOD BY AUTOMATED COUNT: 13.2 % (ref 12.3–15.4)
GLUCOSE SERPL-MCNC: 121 MG/DL (ref 65–99)
HCT VFR BLD AUTO: 44.5 % (ref 37.5–51)
HGB BLD-MCNC: 14.1 G/DL (ref 13–17.7)
LITHIUM SERPL-SCNC: 1.3 MMOL/L (ref 0.6–1.2)
MCH RBC QN AUTO: 29.4 PG (ref 26.6–33)
MCHC RBC AUTO-ENTMCNC: 31.7 G/DL (ref 31.5–35.7)
MCV RBC AUTO: 92.7 FL (ref 79–97)
PLATELET # BLD AUTO: 267 10*3/MM3 (ref 140–450)
PMV BLD AUTO: 9.3 FL (ref 6–12)
POTASSIUM SERPL-SCNC: 4.3 MMOL/L (ref 3.5–5.2)
PROCALCITONIN SERPL-MCNC: 0.14 NG/ML (ref 0–0.25)
RBC # BLD AUTO: 4.8 10*6/MM3 (ref 4.14–5.8)
SODIUM SERPL-SCNC: 135 MMOL/L (ref 136–145)
WBC NRBC COR # BLD AUTO: 14.31 10*3/MM3 (ref 3.4–10.8)

## 2025-01-05 PROCEDURE — 85027 COMPLETE CBC AUTOMATED: CPT | Performed by: NURSE PRACTITIONER

## 2025-01-05 PROCEDURE — 94799 UNLISTED PULMONARY SVC/PX: CPT

## 2025-01-05 PROCEDURE — 0BJ08ZZ INSPECTION OF TRACHEOBRONCHIAL TREE, VIA NATURAL OR ARTIFICIAL OPENING ENDOSCOPIC: ICD-10-PCS | Performed by: STUDENT IN AN ORGANIZED HEALTH CARE EDUCATION/TRAINING PROGRAM

## 2025-01-05 PROCEDURE — 25010000002 LIDOCAINE 2% SOLUTION: Performed by: ANESTHESIOLOGY

## 2025-01-05 PROCEDURE — 25810000003 SODIUM CHLORIDE 0.9 % SOLUTION: Performed by: NURSE PRACTITIONER

## 2025-01-05 PROCEDURE — 94760 N-INVAS EAR/PLS OXIMETRY 1: CPT

## 2025-01-05 PROCEDURE — 80048 BASIC METABOLIC PNL TOTAL CA: CPT | Performed by: NURSE PRACTITIONER

## 2025-01-05 PROCEDURE — 83605 ASSAY OF LACTIC ACID: CPT | Performed by: NURSE PRACTITIONER

## 2025-01-05 PROCEDURE — 80178 ASSAY OF LITHIUM: CPT | Performed by: NURSE PRACTITIONER

## 2025-01-05 PROCEDURE — 0B21XFZ CHANGE TRACHEOSTOMY DEVICE IN TRACHEA, EXTERNAL APPROACH: ICD-10-PCS | Performed by: INTERNAL MEDICINE

## 2025-01-05 PROCEDURE — 0BC18ZZ EXTIRPATION OF MATTER FROM TRACHEA, VIA NATURAL OR ARTIFICIAL OPENING ENDOSCOPIC: ICD-10-PCS | Performed by: INTERNAL MEDICINE

## 2025-01-05 PROCEDURE — 36415 COLL VENOUS BLD VENIPUNCTURE: CPT | Performed by: NURSE PRACTITIONER

## 2025-01-05 PROCEDURE — 71250 CT THORAX DX C-: CPT

## 2025-01-05 PROCEDURE — 94761 N-INVAS EAR/PLS OXIMETRY MLT: CPT

## 2025-01-05 PROCEDURE — 25010000002 PROPOFOL 10 MG/ML EMULSION: Performed by: ANESTHESIOLOGY

## 2025-01-05 PROCEDURE — 84145 PROCALCITONIN (PCT): CPT | Performed by: STUDENT IN AN ORGANIZED HEALTH CARE EDUCATION/TRAINING PROGRAM

## 2025-01-05 RX ORDER — COLCHICINE 0.6 MG/1
0.6 TABLET ORAL 2 TIMES DAILY
Status: DISCONTINUED | OUTPATIENT
Start: 2025-01-05 | End: 2025-01-08 | Stop reason: HOSPADM

## 2025-01-05 RX ORDER — ALBUTEROL SULFATE 0.83 MG/ML
2.5 SOLUTION RESPIRATORY (INHALATION) EVERY 4 HOURS PRN
Status: DISCONTINUED | OUTPATIENT
Start: 2025-01-05 | End: 2025-01-08 | Stop reason: HOSPADM

## 2025-01-05 RX ORDER — ALBUTEROL SULFATE 0.83 MG/ML
2.5 SOLUTION RESPIRATORY (INHALATION)
Status: DISCONTINUED | OUTPATIENT
Start: 2025-01-05 | End: 2025-01-08 | Stop reason: HOSPADM

## 2025-01-05 RX ORDER — OSELTAMIVIR PHOSPHATE 75 MG/1
75 CAPSULE ORAL 2 TIMES DAILY
Status: DISPENSED | OUTPATIENT
Start: 2025-01-05 | End: 2025-01-06

## 2025-01-05 RX ORDER — HYDROXYZINE HYDROCHLORIDE 10 MG/1
25 TABLET, FILM COATED ORAL DAILY PRN
Status: DISCONTINUED | OUTPATIENT
Start: 2025-01-05 | End: 2025-01-08 | Stop reason: HOSPADM

## 2025-01-05 RX ORDER — LITHIUM CARBONATE 300 MG/1
600 CAPSULE ORAL 2 TIMES DAILY
Status: DISCONTINUED | OUTPATIENT
Start: 2025-01-05 | End: 2025-01-05

## 2025-01-05 RX ORDER — SODIUM CHLORIDE FOR INHALATION 7 %
4 VIAL, NEBULIZER (ML) INHALATION 4 TIMES DAILY
Status: DISCONTINUED | OUTPATIENT
Start: 2025-01-05 | End: 2025-01-08 | Stop reason: HOSPADM

## 2025-01-05 RX ORDER — PROPOFOL 10 MG/ML
VIAL (ML) INTRAVENOUS AS NEEDED
Status: DISCONTINUED | OUTPATIENT
Start: 2025-01-05 | End: 2025-01-05 | Stop reason: SURG

## 2025-01-05 RX ORDER — CLONAZEPAM 0.5 MG/1
0.5 TABLET ORAL NIGHTLY PRN
Status: DISCONTINUED | OUTPATIENT
Start: 2025-01-05 | End: 2025-01-08 | Stop reason: HOSPADM

## 2025-01-05 RX ORDER — GUAIFENESIN 600 MG/1
1200 TABLET, EXTENDED RELEASE ORAL 2 TIMES DAILY
Status: DISCONTINUED | OUTPATIENT
Start: 2025-01-05 | End: 2025-01-08 | Stop reason: HOSPADM

## 2025-01-05 RX ORDER — NEBIVOLOL 5 MG/1
5 TABLET ORAL DAILY
Status: DISCONTINUED | OUTPATIENT
Start: 2025-01-05 | End: 2025-01-08 | Stop reason: HOSPADM

## 2025-01-05 RX ORDER — LIDOCAINE HYDROCHLORIDE 20 MG/ML
INJECTION, SOLUTION INFILTRATION; PERINEURAL AS NEEDED
Status: DISCONTINUED | OUTPATIENT
Start: 2025-01-05 | End: 2025-01-05 | Stop reason: SURG

## 2025-01-05 RX ORDER — ALLOPURINOL 100 MG/1
300 TABLET ORAL DAILY
Status: DISCONTINUED | OUTPATIENT
Start: 2025-01-05 | End: 2025-01-08 | Stop reason: HOSPADM

## 2025-01-05 RX ORDER — TRIAMCINOLONE ACETONIDE 1 MG/G
1 CREAM TOPICAL 2 TIMES DAILY
Status: DISCONTINUED | OUTPATIENT
Start: 2025-01-05 | End: 2025-01-08 | Stop reason: HOSPADM

## 2025-01-05 RX ADMIN — SODIUM CHLORIDE 75 ML/HR: 9 INJECTION, SOLUTION INTRAVENOUS at 03:13

## 2025-01-05 RX ADMIN — LIDOCAINE HYDROCHLORIDE 60 MG: 20 INJECTION, SOLUTION INFILTRATION; PERINEURAL at 11:47

## 2025-01-05 RX ADMIN — Medication 10 ML: at 21:01

## 2025-01-05 RX ADMIN — Medication 10 ML: at 03:13

## 2025-01-05 RX ADMIN — PROPOFOL 50 MG: 10 INJECTION, EMULSION INTRAVENOUS at 11:47

## 2025-01-05 RX ADMIN — OSELTAMIVIR PHOSPHATE 75 MG: 75 CAPSULE ORAL at 21:00

## 2025-01-05 RX ADMIN — COLCHICINE 0.6 MG: 0.6 TABLET ORAL at 04:10

## 2025-01-05 RX ADMIN — OSELTAMIVIR PHOSPHATE 75 MG: 75 CAPSULE ORAL at 04:11

## 2025-01-05 RX ADMIN — COLCHICINE 0.6 MG: 0.6 TABLET ORAL at 21:00

## 2025-01-05 RX ADMIN — Medication 10 ML: at 09:04

## 2025-01-05 RX ADMIN — ALBUTEROL SULFATE 2.5 MG: 2.5 SOLUTION RESPIRATORY (INHALATION) at 14:54

## 2025-01-05 RX ADMIN — GUAIFENESIN 1200 MG: 600 TABLET, EXTENDED RELEASE ORAL at 04:10

## 2025-01-05 RX ADMIN — LITHIUM CARBONATE 1200 MG: 450 TABLET ORAL at 04:34

## 2025-01-05 RX ADMIN — Medication 4 ML: at 14:52

## 2025-01-05 RX ADMIN — ALBUTEROL SULFATE 2.5 MG: 2.5 SOLUTION RESPIRATORY (INHALATION) at 19:51

## 2025-01-05 NOTE — H&P
Patient Name:  Wang Tee  YOB: 1975  MRN:  [id number]  Admit Date:  1/4/2025  Patient Care Team:  Julia Knapp APRN as PCP - General (Family Medicine)      Subjective   History Present Illness     Chief Complaint   Patient presents with    Shortness of Breath       Mr. Tee is a 49 y.o. non-smoker with a history of tracheostomy due to vocal cord dysfunction, Bipolar I disorder, hypertension, and pericarditis that presents to [facility] complaining of shortness of breath. He reports he was diagnosed with Influenza A three days ago and he has had a persistent cough. He states he feels like he has mucus stuck in his larynx that he cannot cough up. He reports that this mucus plugging is causing his shortness of breath. He denies fever, chills, and chest pain. Work up in the ED revealed sodium 134, CO2 15.7, creatinine 1.55, BUN 42, glucose 124, AST 62, , and WBC 13.49. A chest x-ray showed a stable cardiomediastinal silhouette when compared to his previous scan and low lung volumes. The possibility of a perihilar infiltrate cannot be excluded. While in the ED, the patient's trach was suctioned by respiratory therapy and he was found to have some minimal bleeding. 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 The mass could not be completely excised, so he is being admitted to have a bronchoscopy procedure tomorrow.        History of Present Illness  Review of Systems   Constitutional:  Negative for chills and fever.   HENT:  Negative for congestion and sore throat.    Eyes:  Negative for photophobia and visual disturbance.   Respiratory:  Positive for cough and shortness of breath. Negative for chest tightness, wheezing and stridor.    Cardiovascular:  Negative for chest pain, palpitations and leg swelling.    Gastrointestinal:  Negative for abdominal pain, nausea and vomiting.   Musculoskeletal:  Negative for arthralgias and myalgias.   Neurological:  Negative for dizziness, weakness, light-headedness, numbness and headaches.        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    Elevated liver enzymes     Gout     Gout     History of medical problems 1992    Bipolar    Hypertension      Past Surgical History:   Procedure Laterality Date    TRACHEOSTOMY       Family History   Problem Relation Age of Onset    Hypertension Mother     Arthritis Mother     Diabetes Mother     Kidney disease Mother     Hypertension Father     Anxiety disorder Other     Bipolar disorder Other      Social History     Tobacco Use    Smoking status: Never    Smokeless tobacco: Never   Vaping Use    Vaping status: Never Used   Substance Use Topics    Alcohol use: Not Currently     Comment: social    Drug use: No     (Not in a hospital admission)    Allergies:    Allergies   Allergen Reactions    Quetiapine Other (See Comments)     Becomes violent when awakening    Haloperidol Other (See Comments)     Locked up jaw    Amlodipine-Valsartan-Hctz Swelling    Aripiprazole Swelling    Olanzapine Unknown - Low Severity    Risperidone Other (See Comments)    Sulfa Antibiotics     Sulfamethoxazole-Trimethoprim     Valproic Acid Irritability    Amlodipine Swelling and Palpitations     SWELLING OF ANKLES    Bupropion Other (See Comments)       Objective    Objective     Vital Signs  Temp:  [97.7 °F (36.5 °C)] 97.7 °F (36.5 °C)  Heart Rate:  [59-71] 63  Resp:  [22-26] 22  BP: (128-130)/(80-89) 128/89  SpO2:  [91 %-98 %] 96 %  on  Flow (L/min) (Oxygen Therapy):  [4-6] 6;   Device (Oxygen Therapy): tracheostomy collar  There is no height or weight on file to calculate BMI.    Physical Exam  Vitals and nursing note  reviewed.   Constitutional:       Appearance: He is ill-appearing.   HENT:      Head: Normocephalic and atraumatic.      Nose: Nose normal.      Mouth/Throat:      Mouth: Mucous membranes are moist.      Pharynx: Oropharynx is clear.   Eyes:      Extraocular Movements: Extraocular movements intact.      Conjunctiva/sclera: Conjunctivae normal.   Neck:      Comments: Tracheostomy  Cardiovascular:      Rate and Rhythm: Normal rate and regular rhythm.      Pulses: Normal pulses.      Heart sounds: Normal heart sounds.   Pulmonary:      Effort: Pulmonary effort is normal.      Breath sounds: Examination of the right-lower field reveals decreased breath sounds. Examination of the left-lower field reveals decreased breath sounds. Decreased breath sounds present.   Abdominal:      General: Bowel sounds are normal.      Palpations: Abdomen is soft.   Musculoskeletal:         General: Normal range of motion.      Cervical back: Normal range of motion and neck supple.   Skin:     General: Skin is warm and dry.   Neurological:      General: No focal deficit present.      Mental Status: He is alert and oriented to person, place, and time.   Psychiatric:         Mood and Affect: Mood normal.         Behavior: Behavior normal.         Results Review:  I reviewed the patient's new clinical results.  I reviewed the patient's new imaging results and agree with the interpretation.  I reviewed the patient's other test results and agree with the interpretation  I personally viewed and interpreted the patient's EKG/Telemetry data  Discussed with ED provider.    Lab Results (last 24 hours)       Procedure Component Value Units Date/Time    Comprehensive Metabolic Panel [[id number]]  (Abnormal) Collected: 01/04/25 1905    Specimen: Blood Updated: 01/04/25 2022     Glucose 124 mg/dL      BUN 42 mg/dL      Creatinine 1.55 mg/dL      Sodium 134 mmol/L      Potassium 4.4 mmol/L      Chloride 108 mmol/L      CO2 15.7 mmol/L      Calcium 9.8  mg/dL      Total Protein 7.3 g/dL      Albumin 3.9 g/dL      ALT (SGPT) 107 U/L      AST (SGOT) 62 U/L      Alkaline Phosphatase 79 U/L      Total Bilirubin 0.7 mg/dL      Globulin 3.4 gm/dL      A/G Ratio 1.1 g/dL      BUN/Creatinine Ratio 27.1     Anion Gap 10.3 mmol/L      eGFR 54.9 mL/min/1.73     Narrative:      GFR Categories in Chronic Kidney Disease (CKD)      GFR Category          GFR (mL/min/1.73)    Interpretation  G1                     90 or greater         Normal or high (1)  G2                      60-89                Mild decrease (1)  G3a                   45-59                Mild to moderate decrease  G3b                   30-44                Moderate to severe decrease  G4                    15-29                Severe decrease  G5                    14 or less           Kidney failure          (1)In the absence of evidence of kidney disease, neither GFR category G1 or G2 fulfill the criteria for CKD.    eGFR calculation 2021 CKD-EPI creatinine equation, which does not include race as a factor    CBC & Differential [[id number]]  (Abnormal) Collected: 01/04/25 1905    Specimen: Blood Updated: 01/04/25 2016    Narrative:      The following orders were created for panel order CBC & Differential.  Procedure                               Abnormality         Status                     ---------                               -----------         ------                     CBC Auto Differential[[id number]]        Abnormal            Final result                 Please view results for these tests on the individual orders.    CBC Auto Differential [[id number]]  (Abnormal) Collected: 01/04/25 1905    Specimen: Blood Updated: 01/04/25 2016     WBC 13.49 10*3/mm3      RBC 4.76 10*6/mm3      Hemoglobin 14.6 g/dL      Hematocrit 42.0 %      MCV 88.2 fL      MCH 30.7 pg      MCHC 34.8 g/dL      RDW 13.0 %      RDW-SD 41.7 fl      MPV 9.3 fL      Platelets 305 10*3/mm3      nRBC 0.0 /100 WBC     Manual  Differential [[id number]]  (Abnormal) Collected: 01/04/25 1905    Specimen: Blood Updated: 01/04/25 2025     Neutrophil % 92.0 %      Lymphocyte % 4.0 %      Monocyte % 4.0 %      Eosinophil % 0.0 %      Basophil % 0.0 %      Neutrophils Absolute 12.41 10*3/mm3      Lymphocytes Absolute 0.54 10*3/mm3      Monocytes Absolute 0.54 10*3/mm3      Eosinophils Absolute 0.00 10*3/mm3      Basophils Absolute 0.00 10*3/mm3      RBC Morphology Normal     WBC Morphology Normal     Platelet Morphology Normal            Imaging Results (Last 24 Hours)       Procedure Component Value Units Date/Time    XR Chest 1 View [[id number]] Collected: 01/04/25 1902     Updated: 01/04/25 1954    Narrative:      XR CHEST 1 VW-     CLINICAL HISTORY:  Cough, influenza A     FINDINGS:     The lung volumes are low. A tracheostomy tube is again noted. The  cardiomediastinal silhouette is prominent size and stable when compared  to the prior study dated 1/2/2020. This is likely accentuated by the low  lung volumes. The perihilar markings are prominent and I cannot entirely  exclude the possibility of a perihilar infiltrate. Otherwise, there is  no convincing evidence for an acute infiltrate.           This report was finalized on 1/4/2025 7:51 PM by Dr. Gordon Meng M.D  on Workstation: [id number]               Results for orders placed during the hospital encounter of 11/05/21    Adult Transthoracic Echo Complete W/ Cont if Necessary Per Protocol    Interpretation Summary  · Left ventricular ejection fraction appears to be 61 - 65%. Left ventricular systolic function is normal.  · Left ventricular wall thickness is consistent with mild to moderate concentric hypertrophy  · Left ventricular diastolic function was indeterminate.  · The right ventricular cavity is mildly dilated. Normal right ventricular systolic function noted.  · Interatrial septal aneurysm present.  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 Continue oxygen to keep sats greater than or equal to 92%  -He has a mild leukocytosis which is most likely reactive. Will defer any antibiotics to pulmonology group  -Check lactate  -CT chest ordered per pulmonology    Influenza A  -Symptomatic treatment  -Continue Tamiflu to complete 5 day course    JACIEL  -His baseline creatinine appears to be around 1  -IVF overnight  -Avoid nephrotoxins  -Repeat labs in AM    Hypertension  -Blood pressures have been stable. Continue continue nebivolol  -Hold Losartan secondary to JACIEL  -Monitor    Bipolar I disorder  -Continue lithium and Vraylar  -Check lithium level    -I discussed the patients findings and my recommendations with patient.    VTE Prophylaxis - SCDs.  Code Status - Full code.       JUANITA Dubose  [facility]  01/04/25  23:20 EST

## 2025-01-05 NOTE — PROGRESS NOTES
Name: Wang Tee ADMIT: 2025   : 1975  PCP: Julia Knapp APRN    MRN: [id number] LOS: 1 days   AGE/SEX: 49 y.o. male  ROOM: [facility]     Subjective   Subjective   No acute events overnight.  Trach collar in place.  Patient still endorsing pretty significant amount of secretions and difficulty breathing.  No chest pain.  No vomiting or diarrhea.    Objective   Objective     Vital Signs  Temp:  [97.7 °F (36.5 °C)-98.2 °F (36.8 °C)] 98.1 °F (36.7 °C)  Heart Rate:  [55-71] 55  Resp:  [20-26] 20  BP: (103-149)/() 149/107  SpO2:  [91 %-100 %] 96 %  on  Flow (L/min) (Oxygen Therapy):  [4-10] 10;   Device (Oxygen Therapy): tracheostomy collar  Body mass index is 40.8 kg/m².    Physical Exam  General: Alert, no acute distress.  Chronically ill-appearing.  Answers questions appropriately.  ENT: Tracheostomy in place, minimal secretions noted.  Neuro: Eyes open and moving in all directions, strength normal in all extremities, no focal deficits.   Lungs: Diminished aeration, transmitted upper airway congestion.  No distress.  Trach collar in place.  Heart: RRR, no murmurs. No edema.  Abdomen: Soft, non-tender, non-distended. Normal bowel sounds.   Ext: Warm and well-perfused. No edema.   Skin: No rashes or lesions. IV site without swelling or erythema.     Results Review     I reviewed the patient's new clinical results:  Results from last 7 days   Lab Units 255 25  190   WBC 10*3/mm3 14.31* 13.49*   HEMOGLOBIN g/dL 14.1 14.6   PLATELETS 10*3/mm3 267 305     Results from last 7 days   Lab Units 25  190   SODIUM mmol/L 135* 134*   POTASSIUM mmol/L 4.3 4.4   CHLORIDE mmol/L 109* 108*   CO2 mmol/L 18.0* 15.7*   BUN mg/dL 37* 42*   CREATININE mg/dL 1.34* 1.55*   GLUCOSE mg/dL 121* 124*   EGFR mL/min/1.73 65.3 54.9*     Results from last 7 days   Lab Units 25  1905   ALBUMIN g/dL 3.9   BILIRUBIN mg/dL 0.7   ALK PHOS U/L 79   AST (SGOT) U/L 62*   ALT (SGPT) U/L  "107*     Results from last 7 days   Lab Units 01/05/25  0225 01/04/25  1905   CALCIUM mg/dL 9.3 9.8   ALBUMIN g/dL  --  3.9     Results from last 7 days   Lab Units 01/05/25  0426   LACTATE mmol/L 1.1     No results found for: \"HGBA1C\", \"POCGLU\"    CT Chest Without Contrast Diagnostic    Result Date: 1/5/2025   1. Images are significantly compromised by motion artifact. However, the patient does appear to have some material within the right mainstem bronchus. While this could reflect debris mass lesion is not excluded.  Radiation dose reduction techniques were utilized, including automated exposure control and exposure modulation based on body size.   This report was finalized on 1/5/2025 1:06 AM by Dr. Petrona De Jesus M.D on Workstation: [id number]       I have personally reviewed all medications:  Scheduled Medications  allopurinol, 300 mg, Oral, Daily  Cariprazine HCl, 1.5 mg, Oral, Daily  colchicine, 0.6 mg, Oral, BID  guaiFENesin, 1,200 mg, Oral, BID  lithium, 1,200 mg, Oral, Daily  nebivolol, 5 mg, Oral, Daily  oseltamivir, 75 mg, Oral, BID  sodium chloride, 10 mL, Intravenous, Q12H  triamcinolone, 1 application , Topical, BID    Infusions  sodium chloride, 75 mL/hr, Last Rate: 75 mL/hr (01/05/25 0313)    Diet  NPO Diet NPO Type: Sips with Meds      Intake/Output Summary (Last 24 hours) at 1/5/2025 1104  Last data filed at 1/5/2025 0017  Gross per 24 hour   Intake 1000 ml   Output --   Net 1000 ml       Assessment/Plan     Active Hospital Problems    Diagnosis  POA    **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       49 y.o. male with Tracheal mass.    Tracheal mass  -Pulmonology to take for bronchoscopy today  -NPO after midnight  -He is currently receiving " humidified oxygen by trach collar at 10 L.  Continue oxygen to keep sats greater than or equal to 92%  -CT chest with material noted in the right mainstem bronchus most likely mass lesion, no obvious consolidation or infiltrate  -WBC further elevated to 14.31 today.  CT chest with no infiltrate and patient afebrile.  Going to hold off on empiric antibiotics for now.  Check procalcitonin.  Will defer to pulmonology.  -Pulmonology following     Influenza A  -Symptomatic treatment  -Continue Tamiflu to complete 5 day course    Elevated transaminases  -AST and ALT elevated on admission labs  -May be secondary to viral infection as above  -Check acute hepatitis panel  -Repeat CMP with morning labs     JACIEL  Metabolic acidosis  -His baseline creatinine appears to be around 1  -Creatinine improved to 1.34 today  -Bicarbonate improved to 18, anion gap remains normal  -Continue IV fluids  -Avoid nephrotoxins  -Repeat BMP with morning labs     Hypertension  -Blood pressures have been stable. Continue continue nebivolol  -Hold Losartan secondary to JACIEL  -Monitor     Bipolar I disorder  -Continue lithium and Vraylar  -Lithium elevated at 1.3.  Just above the upper limit of normal.  No symptoms.  Going to continue current dosing for now.    SCDs for DVT prophylaxis.  Full code.  Discussed with patient and nursing staff.  Anticipate discharge home timing yet to be determined.      Rosa Garvey MD  [facility]  01/05/25  11:04 EST

## 2025-01-05 NOTE — ANESTHESIA POSTPROCEDURE EVALUATION
Patient: Wang Tee    Procedure Summary       Date: 01/05/25 Room / Location:  [facility]    Anesthesia Start: 1134 Anesthesia Stop: 1227    Procedure: BRONCHOSCOPY with cryo (Bronchus) Diagnosis:       Bronchial obstruction      (Bronchial obstruction [J98.09])    Surgeons: Germán Warren MD Provider: Gerardo Garcia MD    Anesthesia Type: general ASA Status: 4 - Emergent            Anesthesia Type: general    Vitals  Vitals Value Taken Time   /67 01/05/25 1223   Temp     Pulse 70 01/05/25 1234   Resp 27 01/05/25 1139   SpO2 95 % 01/05/25 1234   Vitals shown include unfiled device data.        Post Anesthesia Care and Evaluation    Patient location during evaluation: bedside  Patient participation: complete - patient participated  Level of consciousness: awake  Pain management: adequate    Airway patency: patent  Anesthetic complications: No anesthetic complications    Cardiovascular status: acceptable  Respiratory status: acceptable  Hydration status: acceptable    Comments: /67 (BP Location: Right arm, Patient Position: Sitting)   Pulse 68   Temp 36.7 °C (98.1 °F) (Oral)   Resp 27   Wt 125 kg (276 lb 6.4 oz)   SpO2 95%   BMI 40.80 kg/m²

## 2025-01-05 NOTE — PROCEDURES
Tracheostomy tube exchange (CPT: 53116)    Indication:  -Needing larger tracheostomy for airway intervention to improve mucus clearance      Procedure note:   Patient was placed in supine position.  She was preoxygenated with 100% FiO2 with trach collar. The old Geovani trach size 6 tracheostomy tube was removed easily.  The stoma was fitting the old trach just tightly. Size 7 Shiley  cuffed tube was first attempted but I was not able to advance it through and therefore I placed a Shiley 6 cuffless tube.  No significant resistance noted.     Complications:  No major complications occurred.  Patient had coughing fits throughout the procedure and required suctioning after the insertion of the new tracheostomy.  He was connected to 100% oxygen by trach collar and will be weaned down gradually later.    Electronically signed by Germán Warren MD, 01/05/25, 11:26 AM EST.

## 2025-01-05 NOTE — PROCEDURES
Bronchoscopy Procedure Note    Procedure:  Bronchoscopy, Diagnostic    Pre-Operative Diagnosis: shortness of breath, Possible obstruction of tracheostomy tube    Post-Operative Diagnosis: Obstruction of right mainstem bronchus from dried secretions/blood     Indication: Possible obstruction of tracheostomy tube    Anesthesia: Moderate Sedation    Procedure Details: Patient was consented for the procedure with all risks and benefits of the procedure explained in detail.  Patient was given the opportunity to ask questions and all concerns were answered.  The bronchocope was inserted into the main airway via the Size #6 Geovani Tracheostomy tube. An anatomical survey was done of the main airways and the subsegmental bronchus.  The findings are reported below.  No samples were taken.     Findings: The scope was advanced through the Geovani trach.  He did have connection with his trachea and there was a small amount of granulation tissue at the 12 o'clock position upon entering the airway.  Overall everything was patent upon entering the trachea.  The scope was advanced down the trachea and immediately noted was obstruction within the right mainstem bronchus from what appeared to be dried secretions/blood.  Scope was not able to be advanced past this obstruction but there were areas where air was moving through/around it.  The left mainstem bronchus was clear and the left lung showed no significant abnormalities or obstruction.  The scope was withdrawn and no complications noted    Estimated Blood Loss:  Minimal           Specimens:  None                Complications:  None; patient tolerated the procedure well.           Disposition:  admit to floor under A. Will need bronchscopy in endoscopy for the obstruction within the right mainstem.       Patient tolerated the procedure well.    While I was in the room and during my examination of the patient I wore gown, gloves, mask, eye protection and washed my hands before  and after the encounter.  Proper enhanced droplet precautions and isolation precautions were taken.    Tarik Araiza DO  1/5/2025  04:02 EST

## 2025-01-05 NOTE — PROGRESS NOTES
LOS: 1 day   Patient Care Team:  Julia Knapp APRN as PCP - General (Family Medicine)    Chief Complaint:  F/up dyspnea.  Mucous plugging    Subjective   Interval History  I reviewed the admission note, progress notes, PMH, PSH, Family hx, social history, imagings and prior records on this admission, summarized the finding in my note and formulated a transition of care plan.      On oxygen 28% by trach collar.  He continues to have difficulty breathing.  He is tachypneic.    REVIEW OF SYSTEMS:   CARDIOVASCULAR: No chest pain, chest pressure or chest discomfort. No palpitations or edema.     GASTROINTESTINAL: No anorexia, nausea, vomiting or diarrhea. No abdominal pain.  CONSTITUTIONAL: No fever or chills.     Ventilator/Non-Invasive Ventilation Settings (From admission, onward)      None                  Physical Exam:     Vital Signs  Temp:  [97.7 °F (36.5 °C)-98.2 °F (36.8 °C)] 98.1 °F (36.7 °C)  Heart Rate:  [55-71] 55  Resp:  [20-26] 20  BP: (103-149)/() 149/107    Intake/Output Summary (Last 24 hours) at 1/5/2025 0824  Last data filed at 1/5/2025 0017  Gross per 24 hour   Intake 1000 ml   Output --   Net 1000 ml     Flowsheet Rows      Flowsheet Row First Filed Value   Admission Height --   Admission Weight 125 kg (276 lb 6.4 oz) Documented at 01/05/2025 0215            PPE used per hospital policy    General Appearance:   Alert, cooperative, in no acute distress   ENMT:  Mallampati score 3, moist mucous membrane   Eyes:  Pupils equal and reactive to light. EOMI   Neck:   Large. Trachea midline. No thyromegaly.   Lungs:   Coarse with expiratory wheezes.    Heart:   Regular rhythm and normal rate, normal S1 and S2, no         murmur   Skin:   No rash or ecchymosis   Abdomen:    Obese. Soft. No tenderness. No HSM.   Neuro/psych:  Conscious, alert, oriented x3. Strength 5/5 in upper and lower  ext.  Appropriate mood and affect   Extremities:  No cyanosis,  clubbing or edema.  Warm extremities and well-perfused          Results Review:        Results from last 7 days   Lab Units 01/05/25 0225 01/04/25 1905   SODIUM mmol/L 135* 134*   POTASSIUM mmol/L 4.3 4.4   CHLORIDE mmol/L 109* 108*   CO2 mmol/L 18.0* 15.7*   BUN mg/dL 37* 42*   CREATININE mg/dL 1.34* 1.55*   GLUCOSE mg/dL 121* 124*   CALCIUM mg/dL 9.3 9.8         Results from last 7 days   Lab Units 01/05/25 0225 01/04/25 1905   WBC 10*3/mm3 14.31* 13.49*   HEMOGLOBIN g/dL 14.1 14.6   HEMATOCRIT % 44.5 42.0   PLATELETS 10*3/mm3 267 305                                   I reviewed the patient's new clinical results.        Medication Review:   allopurinol, 300 mg, Oral, Daily  Cariprazine HCl, 1.5 mg, Oral, Daily  colchicine, 0.6 mg, Oral, BID  guaiFENesin, 1,200 mg, Oral, BID  lithium, 1,200 mg, Oral, Daily  nebivolol, 5 mg, Oral, Daily  oseltamivir, 75 mg, Oral, BID  sodium chloride, 10 mL, Intravenous, Q12H  triamcinolone, 1 application , Topical, BID        sodium chloride, 75 mL/hr, Last Rate: 75 mL/hr (01/05/25 0313)        Diagnostic imaging:  I personally and independently reviewed the following images:  CT chest 1/5/2025: Endobronchial  defect involving the right mainstem bronchus.    Assessment     Right bronchial obstruction  Influenza A infection  JACIEL, improving  S/p emergent tracheostomy in 2019 for airway obstruction (suspected to be related to risperidone).  Currently on Geovani trach size 6  Leukocytosis  HTN  Bipolar 1 disorder    All problems new to me    Plan       Tamiflu for influenza infection  Initiate scheduled DuoNeb & HS neb 4 times a day for pulmonary toileting  Recommend outpatient use of humidified air at least at night to prevent mucous plugging.  He has future plan to potentially remove the trach this month  Plan for bronchoscopy today to remove the mucous plug.  We may have to swap his trach to a larger size to allow enough room for intervention.    Germán Warren,  MD  01/05/25  08:24 EST          This note was dictated utilizing Dragon dictation

## 2025-01-05 NOTE — ANESTHESIA PREPROCEDURE EVALUATION
Anesthesia Evaluation     NPO Solid Status: > 8 hours             Airway   Mallampati: II  Dental      Pulmonary    (+) ,shortness of breath  (-) sleep apnea, not a smoker    ROS comment: Negative patient screen for JAMILAH  Influenza  Tracheostomy with mucous plug    Cardiovascular     (+) hypertension      Neuro/Psych  (+) psychiatric history  GI/Hepatic/Renal/Endo    (+) morbid obesity, renal disease-    Musculoskeletal     Abdominal    Substance History      OB/GYN          Other                      Anesthesia Plan    ASA 4 - emergent     general       Anesthetic plan, risks, benefits, and alternatives have been provided, discussed and informed consent has been obtained with: patient.    CODE STATUS:    Code Status (Patient has no pulse and is not breathing): CPR (Attempt to Resuscitate)  Medical Interventions (Patient has pulse or is breathing): Full Support

## 2025-01-05 NOTE — CONSULTS
Group: [facility]         CONSULT NOTE    Patient Identification:  Wang Tee  49 y.o.  male  1975  [id number]            Requesting physician: JUANITA Hightower    Reason for Consultation: Tracheal obstruction    CC: Difficulty breathing    History of Present Illness:  Wang Tee is a 49-year-old male with a past medical history of hypertension, bipolar disorder, obesity (BMI 40.80), anxiety, and vocal cord dystonia with chronic tracheostomy.    He presented to the emergency department on 1/4/2025 with complaints of difficulty breathing.  Patient was recently diagnosed with influenza A and he is felt like he has had some blockages within his trachea.  He has noticed increased thick, blood clot sputum production.  He has been suctioning through his indwelling tracheostomy with some success.  He has been cleaning out his existing tracheostomy with a brush to clear off clots.  He came into the ER because he felt like he was having some mucous plugging with difficulty getting secretions up.  He denies recent fever denies chest pain.  ED evaluation included: Creatinine 1.55, BUN 42, WBC 13.49.  Chest x-ray showed low lung volumes.    Review of Systems:  CONSTITUTIONAL:  Denies fevers or chills  EYE:  No new vision changes  EAR:  No change in hearing  CARDIAC:  No chest pain  PULMONARY: Positive productive cough, thick/scabby secretions from tracheostomy tube, shortness of breath  GI:  No diarrhea, hematemesis or hematochezia  RENAL:  No dysuria or urinary frequency  MUSCULOSKELETAL:  No musculoskeletal complaints  ENDOCRINE:  No heat or cold intolerance  INTEGUMENTARY: No skin rashes  NEUROLOGICAL:  No dizziness or confusion.  No seizure activity  PSYCHIATRIC:  No new anxiety or depression  12 system review of systems performed and all else negative     Past Medical History:  Past Medical History:   Diagnosis Date    Abnormal serum thyroxine (T4) level     Bipolar affective     Depression  1992    Disorder of vocal cord     vocal cord dystonia    Elevated liver enzymes     Gout     Gout     History of medical problems 1992    Bipolar    Hypertension        Past Surgical History:  Past Surgical History:   Procedure Laterality Date    TRACHEOSTOMY          Home Meds:  Medications Prior to Admission   Medication Sig Dispense Refill Last Dose/Taking    albuterol (PROVENTIL) (2.5 MG/3ML) 0.083% nebulizer solution Take 2.5 mg by nebulization Every 4 (Four) Hours As Needed for Wheezing. 90 each 0 Patient Taking Differently    allopurinol (ZYLOPRIM) 300 MG tablet Take 1 tablet by mouth Daily. 90 tablet 3 Past Week    ascorbic acid (VITAMIN C) 500 MG tablet Take 1 tablet by mouth Daily. (Patient taking differently: Take 1 tablet by mouth Daily As Needed (causes gout pain).)   1/4/2025    cholecalciferol (VITAMIN D3) 10 MCG (400 UNIT) tablet Take  by mouth Daily.   Past Week    clonazePAM (KlonoPIN) 0.5 MG tablet Take 1 tablet by mouth At Night As Needed.   Patient Taking Differently    co-enzyme Q-10 30 MG capsule Take  by mouth Daily.   Patient Taking Differently    colchicine 0.6 MG tablet Take 1 tablet by mouth 2 (Two) Times a Day. 60 tablet 2 Patient Taking Differently    cyclobenzaprine (FLEXERIL) 10 MG tablet Take 1 tablet by mouth 3 (Three) Times a Day As Needed. for muscle spams   Patient Taking Differently    doxycycline (MONODOX) 100 MG capsule Take 1 capsule by mouth 2 (Two) Times a Day. 14 capsule 0 Patient Taking Differently    guaiFENesin (MUCINEX) 600 MG 12 hr tablet Take 2 tablets by mouth 2 (Two) Times a Day. 30 tablet 0 Patient Taking Differently    hydrOXYzine (ATARAX) 25 MG tablet Take 1 tablet by mouth Daily As Needed for Anxiety.   Past Week    lithium 600 MG capsule Take 1 capsule by mouth 2 (Two) Times a Day. 600 PM (Patient taking differently: Take 2 capsules by mouth Daily. 600 PM)  0 Patient Taking Differently    loperamide (IMODIUM) 2 MG capsule Take 1 capsule by mouth 4 (Four)  Times a Day As Needed for Diarrhea. 20 capsule 0 Patient Taking Differently    losartan (COZAAR) 100 MG tablet Take 1 tablet by mouth Daily. 90 tablet 2 Past Week    Melatonin 10 MG tablet Take 1 tablet by mouth At Night As Needed.   Patient Taking Differently    methylPREDNISolone (MEDROL) 4 MG dose pack Take as directed on package instructions. 21 tablet 0 Past Month    multivitamin with minerals tablet tablet Take 1 tablet by mouth Daily.   Past Week    mupirocin (BACTROBAN) 2 % ointment Apply 1 Application topically to the appropriate area as directed Daily. PRN   Patient Taking Differently    nebivolol (BYSTOLIC) 5 MG tablet Take 1 tablet by mouth once daily 30 tablet 0 1/4/2025    predniSONE (DELTASONE) 20 MG tablet 3 po daily x 1d, then 2 po daily x 2d, then 1 po daily x 2d. 9 tablet 0 Past Month    triamcinolone (KENALOG) 0.1 % cream Apply 1 Application topically to the appropriate area as directed 2 (Two) Times a Day.   Patient Taking Differently    Vraylar 1.5 MG capsule capsule Take 1 capsule by mouth Daily.   1/4/2025    oseltamivir (TAMIFLU) 75 MG capsule Take 1 capsule by mouth 2 (Two) Times a Day for 5 days. 10 capsule 0 Unknown       Allergies:  Allergies   Allergen Reactions    Quetiapine Other (See Comments)     Becomes violent when awakening    Haloperidol Other (See Comments)     Locked up jaw    Amlodipine-Valsartan-Hctz Swelling    Aripiprazole Swelling    Olanzapine Unknown - Low Severity    Risperidone Other (See Comments)    Sulfa Antibiotics     Sulfamethoxazole-Trimethoprim     Valproic Acid Irritability    Amlodipine Swelling and Palpitations     SWELLING OF ANKLES    Bupropion Other (See Comments)       Social History:   Social History     Socioeconomic History    Marital status: Single   Tobacco Use    Smoking status: Never    Smokeless tobacco: Never   Vaping Use    Vaping status: Never Used   Substance and Sexual Activity    Alcohol use: Not Currently     Comment: social    Drug use:  No    Sexual activity: Not Currently     Partners: Male       Family History:  Family History   Problem Relation Age of Onset    Hypertension Mother     Arthritis Mother     Diabetes Mother     Kidney disease Mother     Hypertension Father     Anxiety disorder Other     Bipolar disorder Other        Physical Exam:  /67 (BP Location: Right arm, Patient Position: Lying)   Pulse 55   Temp 98.2 °F (36.8 °C) (Oral)   Resp 20   Wt 125 kg (276 lb 6.4 oz)   SpO2 96%   BMI 40.80 kg/m²  Body mass index is 40.8 kg/m². 96% 125 kg (276 lb 6.4 oz)  Constitutional: Middle aged obese male pt sitting up in bed, No acute respiratory distress, no accessory muscle use, pleasant and interactive  Head: - NCAT  Eyes: No pallor, Anicteric conjunctiva, EOMI.  ENMT:  Mallampati 3, no oral thrush. Dry MM.  Existing Geovani tracheostomy in place, no inner cannula  NECK: Trachea midline, No thyromegaly, no palpable cervical LNpathy no JVD  Heart: RRR, no murmur. No pedal edema   Lungs: OLIVER +, No wheezes/ crackles heard    Abdomen: Soft. No tenderness, guarding or rigidity. No palpable masses  Extremities: Extremities warm and well perfused. No cyanosis/ clubbing  Neuro: Conscious, answers appropriately, no gross focal neuro deficits  Psych: Mood and affect appropriate    PPE recommended per [facility] [place] operated by [facility] infectious disease Isolation protocol for the current clinical scenario(as mentioned below) was followed.      LABS:  COVID19   Date Value Ref Range Status   01/02/2025 Not Detected Not Detected - Ref. Range Final       Lab Results   Component Value Date    CALCIUM 9.3 01/05/2025     Results from last 7 days   Lab Units 01/05/25  0225 01/04/25  1905   SODIUM mmol/L 135* 134*   POTASSIUM mmol/L 4.3 4.4   CHLORIDE mmol/L 109* 108*   CO2 mmol/L 18.0* 15.7*   BUN mg/dL 37* 42*   CREATININE mg/dL 1.34* 1.55*   GLUCOSE mg/dL 121* 124*   CALCIUM mg/dL 9.3 9.8   WBC 10*3/mm3 14.31* 13.49*   HEMOGLOBIN g/dL 14.1 14.6   PLATELETS 10*3/mm3  267 305   ALT (SGPT) U/L  --  107*   AST (SGOT) U/L  --  62*     Lab Results   Component Value Date    CKTOTAL 354 (H) 04/06/2023    TROPONINT <6 11/03/2024             Results from last 7 days   Lab Units 01/05/25  0426   LACTATE mmol/L 1.1         Results from last 7 days   Lab Units 01/02/25  2117   INFLUENZA B PCR  Not Detected   RSV, PCR  Not Detected             Lab Results   Component Value Date    TSH 0.262 (L) 01/30/2024     Estimated Creatinine Clearance: 88.1 mL/min (A) (by C-G formula based on SCr of 1.34 mg/dL (H)).         Imaging: I personally visualized the images of scans/x-rays performed within last 3 days.      Assessment:  Tracheal obstruction  Influenza A  Acute kidney injury  Hypertension  Bipolar 1 disorder    Recommendations:  Called to the emergency department to evaluate patient.  Dr. Araiza did a bedside bronchoscope to evaluate tracheal obstruction and check position of existing tracheostomy tube-see separate note.  Obstruction noted within the right mainstem bronchus from what looks like dried secretions/blood-unable to suction out due to thickness/dry nature of obstruction.  Plan for bronchoscopy in endoscopy on 1/5/2025.  NPO at midnight.  Check CT chest without contrast to better evaluate for possible mucous plugging.  Hold anticoagulation.    Humidified trach collar ordered overnight-concern that patient's dried secretions are secondary to lack of humidity.    Patient was placed in face mask upon entering room and kept mask on throughout our encounter. I wore full protective equipment throughout this patient encounter including a face mask, gown and gloves. Hand hygiene was performed before donning protective equipment and after removal when leaving the room.    Anabelle Cha, APRN  1/5/2025  06:54 EST      Much of this encounter note is an electronic transcription/translation of spoken language to printed text using Dragon Software.

## 2025-01-06 ENCOUNTER — APPOINTMENT (OUTPATIENT)
Dept: ULTRASOUND IMAGING | Facility: HOSPITAL | Age: 50
End: 2025-01-06
Payer: COMMERCIAL

## 2025-01-06 LAB
ALBUMIN SERPL-MCNC: 3.6 G/DL (ref 3.5–5.2)
ALBUMIN/GLOB SERPL: 1.2 G/DL
ALP SERPL-CCNC: 78 U/L (ref 39–117)
ALT SERPL W P-5'-P-CCNC: 114 U/L (ref 1–41)
ANION GAP SERPL CALCULATED.3IONS-SCNC: 9 MMOL/L (ref 5–15)
AST SERPL-CCNC: 54 U/L (ref 1–40)
BILIRUB SERPL-MCNC: 1.3 MG/DL (ref 0–1.2)
BUN SERPL-MCNC: 26 MG/DL (ref 6–20)
BUN/CREAT SERPL: 19.7 (ref 7–25)
CALCIUM SPEC-SCNC: 9.3 MG/DL (ref 8.6–10.5)
CHLORIDE SERPL-SCNC: 108 MMOL/L (ref 98–107)
CO2 SERPL-SCNC: 21 MMOL/L (ref 22–29)
CREAT SERPL-MCNC: 1.32 MG/DL (ref 0.76–1.27)
DEPRECATED RDW RBC AUTO: 43.1 FL (ref 37–54)
EGFRCR SERPLBLD CKD-EPI 2021: 66.5 ML/MIN/1.73
ERYTHROCYTE [DISTWIDTH] IN BLOOD BY AUTOMATED COUNT: 13.2 % (ref 12.3–15.4)
GLOBULIN UR ELPH-MCNC: 3 GM/DL
GLUCOSE SERPL-MCNC: 172 MG/DL (ref 65–99)
HAV IGM SERPL QL IA: NORMAL
HBV CORE IGM SERPL QL IA: NORMAL
HBV SURFACE AG SERPL QL IA: NORMAL
HCT VFR BLD AUTO: 42.5 % (ref 37.5–51)
HCV AB SER QL: NORMAL
HGB BLD-MCNC: 13.7 G/DL (ref 13–17.7)
MAGNESIUM SERPL-MCNC: 2.3 MG/DL (ref 1.6–2.6)
MCH RBC QN AUTO: 29 PG (ref 26.6–33)
MCHC RBC AUTO-ENTMCNC: 32.2 G/DL (ref 31.5–35.7)
MCV RBC AUTO: 89.9 FL (ref 79–97)
PHOSPHATE SERPL-MCNC: 2.5 MG/DL (ref 2.5–4.5)
PLATELET # BLD AUTO: 293 10*3/MM3 (ref 140–450)
PMV BLD AUTO: 8.6 FL (ref 6–12)
POTASSIUM SERPL-SCNC: 3.6 MMOL/L (ref 3.5–5.2)
PROT SERPL-MCNC: 6.6 G/DL (ref 6–8.5)
RBC # BLD AUTO: 4.73 10*6/MM3 (ref 4.14–5.8)
SODIUM SERPL-SCNC: 138 MMOL/L (ref 136–145)
WBC NRBC COR # BLD AUTO: 9.67 10*3/MM3 (ref 3.4–10.8)

## 2025-01-06 PROCEDURE — 83735 ASSAY OF MAGNESIUM: CPT | Performed by: STUDENT IN AN ORGANIZED HEALTH CARE EDUCATION/TRAINING PROGRAM

## 2025-01-06 PROCEDURE — 85027 COMPLETE CBC AUTOMATED: CPT | Performed by: STUDENT IN AN ORGANIZED HEALTH CARE EDUCATION/TRAINING PROGRAM

## 2025-01-06 PROCEDURE — 76705 ECHO EXAM OF ABDOMEN: CPT

## 2025-01-06 PROCEDURE — 84100 ASSAY OF PHOSPHORUS: CPT | Performed by: STUDENT IN AN ORGANIZED HEALTH CARE EDUCATION/TRAINING PROGRAM

## 2025-01-06 PROCEDURE — 94664 DEMO&/EVAL PT USE INHALER: CPT

## 2025-01-06 PROCEDURE — 25810000003 SODIUM CHLORIDE 0.9 % SOLUTION: Performed by: STUDENT IN AN ORGANIZED HEALTH CARE EDUCATION/TRAINING PROGRAM

## 2025-01-06 PROCEDURE — 80074 ACUTE HEPATITIS PANEL: CPT | Performed by: STUDENT IN AN ORGANIZED HEALTH CARE EDUCATION/TRAINING PROGRAM

## 2025-01-06 PROCEDURE — 94799 UNLISTED PULMONARY SVC/PX: CPT

## 2025-01-06 PROCEDURE — 80053 COMPREHEN METABOLIC PANEL: CPT | Performed by: STUDENT IN AN ORGANIZED HEALTH CARE EDUCATION/TRAINING PROGRAM

## 2025-01-06 PROCEDURE — 94761 N-INVAS EAR/PLS OXIMETRY MLT: CPT

## 2025-01-06 RX ORDER — SODIUM CHLORIDE 9 MG/ML
100 INJECTION, SOLUTION INTRAVENOUS CONTINUOUS
Status: DISCONTINUED | OUTPATIENT
Start: 2025-01-06 | End: 2025-01-07

## 2025-01-06 RX ORDER — ASCORBIC ACID 500 MG
500 TABLET ORAL DAILY
Status: DISCONTINUED | OUTPATIENT
Start: 2025-01-06 | End: 2025-01-06

## 2025-01-06 RX ORDER — ASCORBIC ACID 500 MG
1000 TABLET ORAL DAILY
Status: DISCONTINUED | OUTPATIENT
Start: 2025-01-06 | End: 2025-01-07

## 2025-01-06 RX ADMIN — SODIUM CHLORIDE 100 ML/HR: 9 INJECTION, SOLUTION INTRAVENOUS at 17:37

## 2025-01-06 RX ADMIN — TRIAMCINOLONE ACETONIDE 1 APPLICATION: 1 CREAM TOPICAL at 10:39

## 2025-01-06 RX ADMIN — Medication 4 ML: at 20:37

## 2025-01-06 RX ADMIN — Medication 10 ML: at 10:39

## 2025-01-06 RX ADMIN — COLCHICINE 0.6 MG: 0.6 TABLET ORAL at 05:33

## 2025-01-06 RX ADMIN — LITHIUM CARBONATE 1200 MG: 450 TABLET ORAL at 05:34

## 2025-01-06 RX ADMIN — ALLOPURINOL 300 MG: 100 TABLET ORAL at 05:34

## 2025-01-06 RX ADMIN — Medication 4 ML: at 11:12

## 2025-01-06 RX ADMIN — OXYCODONE HYDROCHLORIDE AND ACETAMINOPHEN 1000 MG: 500 TABLET ORAL at 12:24

## 2025-01-06 RX ADMIN — COLCHICINE 0.6 MG: 0.6 TABLET ORAL at 20:15

## 2025-01-06 RX ADMIN — ALBUTEROL SULFATE 2.5 MG: 2.5 SOLUTION RESPIRATORY (INHALATION) at 20:33

## 2025-01-06 RX ADMIN — ALBUTEROL SULFATE 2.5 MG: 2.5 SOLUTION RESPIRATORY (INHALATION) at 11:11

## 2025-01-06 RX ADMIN — CARIPRAZINE 1.5 MG: 1.5 CAPSULE, GELATIN COATED ORAL at 05:34

## 2025-01-06 RX ADMIN — ALBUTEROL SULFATE 2.5 MG: 2.5 SOLUTION RESPIRATORY (INHALATION) at 07:15

## 2025-01-06 RX ADMIN — OSELTAMIVIR PHOSPHATE 75 MG: 75 CAPSULE ORAL at 05:34

## 2025-01-06 RX ADMIN — NEBIVOLOL 5 MG: 5 TABLET ORAL at 10:38

## 2025-01-06 RX ADMIN — Medication 4 ML: at 07:16

## 2025-01-06 RX ADMIN — Medication 4 ML: at 14:30

## 2025-01-06 RX ADMIN — Medication 10 ML: at 20:18

## 2025-01-06 RX ADMIN — ALBUTEROL SULFATE 2.5 MG: 2.5 SOLUTION RESPIRATORY (INHALATION) at 14:29

## 2025-01-06 RX ADMIN — TRIAMCINOLONE ACETONIDE 1 APPLICATION: 1 CREAM TOPICAL at 20:17

## 2025-01-06 NOTE — PROGRESS NOTES
Discharge Planning Assessment  [facility]     Patient Name: Wang Tee  MRN: [id number]  Today's Date: 1/6/2025    Admit Date: 1/4/2025    Plan: Return home with family   Discharge Needs Assessment       Row Name 01/06/25 1230       Living Environment    People in Home parent(s)    Name(s) of People in Home Parents Lola    Current Living Arrangements home    Potentially Unsafe Housing Conditions none    Primary Care Provided by self    Provides Primary Care For no one    Family Caregiver if Needed parent(s)    Family Caregiver Names Father Ghassan [phone] and mother Sheri [phone]    Quality of Family Relationships helpful    Able to Return to Prior Arrangements yes       Resource/Environmental Concerns    Resource/Environmental Concerns none    Transportation Concerns none       Transition Planning    Patient/Family Anticipates Transition to home with family    Patient/Family Anticipated Services at Transition none    Transportation Anticipated family or friend will provide       Discharge Needs Assessment    Readmission Within the Last 30 Days no previous admission in last 30 days    Equipment Currently Used at Home suction equipment;trach supplies    Concerns to be Addressed denies needs/concerns at this time    Anticipated Changes Related to Illness none    Equipment Needed After Discharge oxygen                   Discharge Plan       Row Name 01/06/25 9909       Plan    Plan Return home with family    Patient/Family in Agreement with Plan yes    Plan Comments Spoke with patient and his father Ghassan [phone] at bedside.  Patient lives with his parents, has a humidifer by his bed, uses suction.  He has never used HH or been to SNF.  PCP is Julia GRANT and pharmacy is [facility] [address].  If needs O2 at home, is agreeable to using [place].  Patient drives, parents drive and will assist as needed.  He plans to return home at [place].  CCP will follow.  William GARCIA                   Continued Care and Services - Admitted Since 1/4/2025    No active coordination exists for this encounter.       Expected Discharge Date and Time       Expected Discharge Date Expected Discharge Time    Jan 9, 2025            Demographic Summary       Row Name 01/06/25 1229       General Information    Admission Type inpatient    Arrived From home    Referral Source admission list    Reason for Consult discharge planning    Preferred Language English                   Functional Status       Row Name 01/06/25 1229       Functional Status    Usual Activity Tolerance good    Current Activity Tolerance moderate       Functional Status, IADL    Medications independent    Meal Preparation independent;assistive person  Lives with his parents    Housekeeping assistive person    Laundry assistive person    Shopping assistive person;independent       Mental Status    General Appearance WDL WDL       Mental Status Summary    Recent Changes in Mental Status/Cognitive Functioning no changes                              Becky S. [place], RN

## 2025-01-06 NOTE — PROGRESS NOTES
Consult Daily Progress Note  [facility]   01/06/25      Patient Name:  Wang Tee  MRN:  [id number]   YOB: 1975  Age: 49 y.o.  Sex: male  LOS: 2    Reason for Consult:  Dyspnea, mucous plugging    Hospital Course:   49-year-old male with a history of bipolar disorder, obesity, vocal cord dystonia with chronic tracheostomy who presented to the emergency department with difficulty breathing.  Patient was recently diagnosed with influenza A and with thick sputum/blood production from his trachea.    Interval History:  Patient and bronchoscopy performed yesterday at bedside with right mainstem obstruction noted with dried blood.  Sequently had repeat bronchoscopy performed in the endoscopy lab with extraction of clot with underlying friable mucosa.  No acute events overnight  Respiratory PCR positive for influenza A  6 L tracheostomy  Doing well on evaluation, states that he feels significantly better  Using hypertonic saline and continue to cough up some mucous plugs  Father is at bedside    Physical Exam:  Vitals:    01/06/25 1115   BP:    Pulse: 69   Resp: 22   Temp:    SpO2: 100%       Intake/Output    Intake/Output Summary (Last 24 hours) at 1/6/2025 1154  Last data filed at 1/6/2025 0900  Gross per 24 hour   Intake 560 ml   Output --   Net 560 ml       General: Alert, nontoxic, NAD  HEENT: NC/AT, EOMI, MMM  Neck: Tracheostomy  Cardiac: RRR, no murmur, gallops, rubs  Pulmonary: Clear to auscultation bilaterally, no adventitious breath sounds, normal respiratory effort  GI: Soft, non-tender, non-distended, normal bowel sounds  Extremities: Warm, well perfused, no LE edema  Skin: no visible rash  Neuro: CN II - XII grossly intact  Psychiatry: Normal mood and affect      Data Review:  Results from last 7 days   Lab Units 01/06/25  0731 01/05/25  0225 01/04/25  1905   WBC 10*3/mm3 9.67 14.31* 13.49*   HEMOGLOBIN g/dL 13.7 14.1 14.6   PLATELETS 10*3/mm3 293 267 305     Results from  last 7 days   Lab Units 01/06/25  0949 01/05/25 0225 01/04/25  1905   SODIUM mmol/L 138 135* 134*   POTASSIUM mmol/L 3.6 4.3 4.4   CHLORIDE mmol/L 108* 109* 108*   CO2 mmol/L 21.0* 18.0* 15.7*   BUN mg/dL 26* 37* 42*   CREATININE mg/dL 1.32* 1.34* 1.55*   GLUCOSE mg/dL 172* 121* 124*   CALCIUM mg/dL 9.3 9.3 9.8   MAGNESIUM mg/dL 2.3  --   --    PHOSPHORUS mg/dL 2.5  --   --    Estimated Creatinine Clearance: 89.4 mL/min (A) (by C-G formula based on SCr of 1.32 mg/dL (H)).    Results from last 7 days   Lab Units 01/06/25  0949 01/06/25  0731 01/05/25  0426 01/05/25 0225 01/04/25  1905   AST (SGOT) U/L 54*  --   --   --  62*   ALT (SGPT) U/L 114*  --   --   --  107*   PROCALCITONIN ng/mL  --   --   --  0.14  --    LACTATE mmol/L  --   --  1.1  --   --    PLATELETS 10*3/mm3  --  293  --  267 305               Imaging:  Reviewed chest images personally from past 3 days    ASSESSMENT  /  PLAN:    Right mainstem obstruction with blood clot  Influenza A infection  JACIEL, improving  S/p emergent tracheostomy in 2019 for airway obstruction (suspected to be related to risperidone).  Currently on Geovani trach size 6  Leukocytosis  HTN  Bipolar 1 disorder    -Patient underwent 2 bronchoscopies with extraction of right mainstem mucous plug/blood clot with improvement in his respiratory symptoms.  -Completed course of Tamiflu for influenza  -Bronchodilator therapy with albuterol and hypertonic saline.  Helping with airway clearance  -Recommend humidified air as an outpatient to prevent recurrent mucous plugging. He has future plan to potentially remove the trach this month    All issues new to me today.  Prior hospital course, labs and imaging reviewed.    Thank you for allowing us to participate in this patients care. Pulmonary will continue to follow.     Donnell Coleman MD  [facility]  Pulmonary and Critical Care Medicine, Interventional Pulmonology    Parts of this note may be an electronic  transcription/translation of spoken language to printed text using the Dragon dictation system.

## 2025-01-06 NOTE — PROGRESS NOTES
Name: Wang Tee ADMIT: 2025   : 1975  PCP: Julia Knapp APRN    MRN: [id number] LOS: 2 days   AGE/SEX: 49 y.o. male  ROOM: [facility]     Subjective   Subjective   Patient seen this morning, father present at the bedside.  Status post bronchoscopy yesterday with extraction of clot.  Doing well, reports significant improvement after bronchoscopy.  Patient and father wants vitamin C to be resumed as he takes it at home, ordered.    Review of Systems   As above  Objective   Objective   Vital Signs  Temp:  [98.1 °F (36.7 °C)-98.4 °F (36.9 °C)] 98.4 °F (36.9 °C)  Heart Rate:  [62-78] 69  Resp:  [20-24] 22  BP: (130-157)/() 155/99  SpO2:  [93 %-100 %] 100 %  on  Flow (L/min) (Oxygen Therapy):  [6] 6;   Device (Oxygen Therapy): tracheostomy collar  Body mass index is 40.8 kg/m².  Physical Exam    General: Alert, laying in bed, not in distress,  HEENT: Normocephalic, atraumatic, tracheostomy in place  CV: Regular rate and rhythm, no murmurs rubs or gallops  Lungs: Clear to auscultation bilaterally, no crackles or wheezes  Abdomen: Soft, nontender, nondistended  Extremities: No significant peripheral edema , no cyanosis     Results Review     I reviewed the patient's new clinical results.  Results from last 7 days   Lab Units 25  0731 25  1905   WBC 10*3/mm3 9.67 14.31* 13.49*   HEMOGLOBIN g/dL 13.7 14.1 14.6   PLATELETS 10*3/mm3 293 267 305     Results from last 7 days   Lab Units 25  0949 25  0225 25  1905   SODIUM mmol/L 138 135* 134*   POTASSIUM mmol/L 3.6 4.3 4.4   CHLORIDE mmol/L 108* 109* 108*   CO2 mmol/L 21.0* 18.0* 15.7*   BUN mg/dL 26* 37* 42*   CREATININE mg/dL 1.32* 1.34* 1.55*   GLUCOSE mg/dL 172* 121* 124*   Estimated Creatinine Clearance: 89.4 mL/min (A) (by C-G formula based on SCr of 1.32 mg/dL (H)).  Results from last 7 days   Lab Units 25  0949 25  1905   ALBUMIN g/dL 3.6 3.9   BILIRUBIN mg/dL 1.3* 0.7   ALK PHOS U/L 78 79  "  AST (SGOT) U/L 54* 62*   ALT (SGPT) U/L 114* 107*     Results from last 7 days   Lab Units 01/06/25  0949 01/05/25  0225 01/04/25  1905   CALCIUM mg/dL 9.3 9.3 9.8   ALBUMIN g/dL 3.6  --  3.9   MAGNESIUM mg/dL 2.3  --   --    PHOSPHORUS mg/dL 2.5  --   --      Results from last 7 days   Lab Units 01/05/25  0426 01/05/25  0225   PROCALCITONIN ng/mL  --  0.14   LACTATE mmol/L 1.1  --      COVID19   Date Value Ref Range Status   01/02/2025 Not Detected Not Detected - Ref. Range Final   11/05/2021 Not Detected Not Detected - Ref. Range Final     No results found for: \"HGBA1C\", \"POCGLU\"        CT Chest Without Contrast Diagnostic  Narrative: CT OF THE CHEST WITHOUT CONTRAST     HISTORY: Right mainstem occlusion.     COMPARISON: 8/26/2019     TECHNIQUE: Axial CT imaging was obtained through the thorax. No IV  contrast was administered.     FINDINGS:  Images are degraded by significant respiratory artifact. No acute  osseous abnormalities are seen. Tracheostomy is noted. It appears  appropriately positioned. The patient appears to have some mild  bibasilar atelectasis. The study does appear to confirm some material  within the right mainstem bronchus. This could reflect some secretions,  although mass would be a consideration. Thyroid gland is mildly  prominent in size. The esophagus is within normal limits. Mediastinal  lymph nodes do not appear pathologically enlarged. There is enlargement  of the main pulmonary artery, which can be seen in setting of pulmonary  arterial hypertension. There is some dilatation of the ascending aorta,  measuring up to 4.1 cm. Proximal descending thoracic aorta measures 2.8  cm. The distal aorta measures 2.5 cm. No coronary artery calcifications  are seen. Images through the upper abdomen demonstrate no acute  abnormalities.     Impression:    1. Images are significantly compromised by motion artifact. However, the  patient does appear to have some material within the right " mainstem  bronchus. While this could reflect debris mass lesion is not excluded.     Radiation dose reduction techniques were utilized, including automated  exposure control and exposure modulation based on body size.        This report was finalized on 1/5/2025 1:06 AM by Dr. Petrona De Jesus M.D on Workstation: [id number]       Scheduled Medications  albuterol, 2.5 mg, Nebulization, Q6H - RT  allopurinol, 300 mg, Oral, Daily  vitamin C, 1,000 mg, Oral, Daily  Cariprazine HCl, 1.5 mg, Oral, Daily  colchicine, 0.6 mg, Oral, BID  guaiFENesin, 1,200 mg, Oral, BID  lithium, 1,200 mg, Oral, Daily  nebivolol, 5 mg, Oral, Daily  oseltamivir, 75 mg, Oral, BID  sodium chloride, 10 mL, Intravenous, Q12H  sodium chloride, 4 mL, Nebulization, 4x Daily  triamcinolone, 1 application , Topical, BID    Infusions   Diet  Diet: Regular/House; Fluid Consistency: Thin (IDDSI 0)    I have personally reviewed     [x]  Laboratory   [x]  Microbiology   [x]  Radiology   [x]  EKG/Telemetry  []  Cardiology/Vascular   []  Pathology    []  Records       Assessment/Plan     Active Hospital Problems    Diagnosis  POA    **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     Right mainstem obstruction with blood clot  Influenza A infection  -CT chest with material noted in the right mainstem bronchus most likely mass lesion, no obvious consolidation or infiltrate  -Status post 2 bronchoscopies, with extraction of right mainstem mucous plug/blood clot, feeling better afterwards.  -Completed Tamiflu for influenza  -Bronchodilators, hypertonic saline  -Modified oxygen recommended to prevent recurrent plugging          Elevated transaminases  -AST and ALT elevated on admission labs  -May be secondary to viral  infection as above  -Hepatitis panel negative  -Check liver ultrasound  -Daily CMP     JACIEL  Metabolic acidosis  -His baseline creatinine appears to be around 1  -Creatinine and bicarb are improving  -Continue IV fluids  -     Hypertension  -Blood pressures have been stable. Continue continue nebivolol  -Hold Losartan secondary to JACIEL  -Monitor     Bipolar I disorder  -Continue lithium and Vraylar  -Lithium was elevated at 1.3.  Just above the upper limit of normal.  No symptoms.  Going to continue current dosing for now.  -Will recheck level in a.m.        SCDs for DVT prophylaxis.  Full code.  Discussed with patient.  Likely stable to be discharged in 1-2 days  Expected Discharge Date: 1/9/2025; Expected Discharge Time:        Copied text in this note has been reviewed and is accurate as of 01/06/25.         Dictated utilizing Dragon dictation        Naomi Lundy MD  [facility]  01/06/25  13:42 EST

## 2025-01-07 PROBLEM — K76.0 FATTY LIVER DISEASE, NONALCOHOLIC: Status: ACTIVE | Noted: 2025-01-07

## 2025-01-07 LAB
ALBUMIN SERPL-MCNC: 3.5 G/DL (ref 3.5–5.2)
ALBUMIN/GLOB SERPL: 1.1 G/DL
ALP SERPL-CCNC: 77 U/L (ref 39–117)
ALT SERPL W P-5'-P-CCNC: 112 U/L (ref 1–41)
ANION GAP SERPL CALCULATED.3IONS-SCNC: 7 MMOL/L (ref 5–15)
AST SERPL-CCNC: 58 U/L (ref 1–40)
BILIRUB SERPL-MCNC: 1.5 MG/DL (ref 0–1.2)
BUN SERPL-MCNC: 21 MG/DL (ref 6–20)
BUN/CREAT SERPL: 17.6 (ref 7–25)
CALCIUM SPEC-SCNC: 9.3 MG/DL (ref 8.6–10.5)
CHLORIDE SERPL-SCNC: 110 MMOL/L (ref 98–107)
CO2 SERPL-SCNC: 21 MMOL/L (ref 22–29)
CREAT SERPL-MCNC: 1.19 MG/DL (ref 0.76–1.27)
DEPRECATED RDW RBC AUTO: 42.5 FL (ref 37–54)
EGFRCR SERPLBLD CKD-EPI 2021: 75.3 ML/MIN/1.73
ERYTHROCYTE [DISTWIDTH] IN BLOOD BY AUTOMATED COUNT: 13.2 % (ref 12.3–15.4)
GLOBULIN UR ELPH-MCNC: 3.3 GM/DL
GLUCOSE SERPL-MCNC: 94 MG/DL (ref 65–99)
HCT VFR BLD AUTO: 38.8 % (ref 37.5–51)
HGB BLD-MCNC: 13.3 G/DL (ref 13–17.7)
LITHIUM SERPL-SCNC: 1.1 MMOL/L (ref 0.6–1.2)
MAGNESIUM SERPL-MCNC: 2.2 MG/DL (ref 1.6–2.6)
MCH RBC QN AUTO: 30.4 PG (ref 26.6–33)
MCHC RBC AUTO-ENTMCNC: 34.3 G/DL (ref 31.5–35.7)
MCV RBC AUTO: 88.8 FL (ref 79–97)
PHOSPHATE SERPL-MCNC: 2.4 MG/DL (ref 2.5–4.5)
PLATELET # BLD AUTO: 294 10*3/MM3 (ref 140–450)
PMV BLD AUTO: 8.9 FL (ref 6–12)
POTASSIUM SERPL-SCNC: 3.8 MMOL/L (ref 3.5–5.2)
PROT SERPL-MCNC: 6.8 G/DL (ref 6–8.5)
RBC # BLD AUTO: 4.37 10*6/MM3 (ref 4.14–5.8)
SODIUM SERPL-SCNC: 138 MMOL/L (ref 136–145)
WBC NRBC COR # BLD AUTO: 9.39 10*3/MM3 (ref 3.4–10.8)

## 2025-01-07 PROCEDURE — 85027 COMPLETE CBC AUTOMATED: CPT | Performed by: STUDENT IN AN ORGANIZED HEALTH CARE EDUCATION/TRAINING PROGRAM

## 2025-01-07 PROCEDURE — 94761 N-INVAS EAR/PLS OXIMETRY MLT: CPT

## 2025-01-07 PROCEDURE — 80178 ASSAY OF LITHIUM: CPT | Performed by: STUDENT IN AN ORGANIZED HEALTH CARE EDUCATION/TRAINING PROGRAM

## 2025-01-07 PROCEDURE — 94640 AIRWAY INHALATION TREATMENT: CPT

## 2025-01-07 PROCEDURE — 80053 COMPREHEN METABOLIC PANEL: CPT | Performed by: STUDENT IN AN ORGANIZED HEALTH CARE EDUCATION/TRAINING PROGRAM

## 2025-01-07 PROCEDURE — 94664 DEMO&/EVAL PT USE INHALER: CPT

## 2025-01-07 PROCEDURE — 94799 UNLISTED PULMONARY SVC/PX: CPT

## 2025-01-07 PROCEDURE — 84100 ASSAY OF PHOSPHORUS: CPT | Performed by: STUDENT IN AN ORGANIZED HEALTH CARE EDUCATION/TRAINING PROGRAM

## 2025-01-07 PROCEDURE — 83735 ASSAY OF MAGNESIUM: CPT | Performed by: STUDENT IN AN ORGANIZED HEALTH CARE EDUCATION/TRAINING PROGRAM

## 2025-01-07 PROCEDURE — 25810000003 SODIUM CHLORIDE 0.9 % SOLUTION: Performed by: STUDENT IN AN ORGANIZED HEALTH CARE EDUCATION/TRAINING PROGRAM

## 2025-01-07 RX ORDER — LITHIUM CARBONATE 300 MG/1
600 CAPSULE ORAL 2 TIMES DAILY WITH MEALS
Status: DISCONTINUED | OUTPATIENT
Start: 2025-01-08 | End: 2025-01-08 | Stop reason: HOSPADM

## 2025-01-07 RX ORDER — ENOXAPARIN SODIUM 100 MG/ML
40 INJECTION SUBCUTANEOUS 2 TIMES DAILY
Status: DISCONTINUED | OUTPATIENT
Start: 2025-01-07 | End: 2025-01-08 | Stop reason: HOSPADM

## 2025-01-07 RX ORDER — FAMOTIDINE 20 MG/1
20 TABLET, FILM COATED ORAL
Status: DISCONTINUED | OUTPATIENT
Start: 2025-01-07 | End: 2025-01-08 | Stop reason: HOSPADM

## 2025-01-07 RX ORDER — ASCORBIC ACID 500 MG
1000 TABLET ORAL 2 TIMES DAILY
Status: DISCONTINUED | OUTPATIENT
Start: 2025-01-07 | End: 2025-01-08 | Stop reason: HOSPADM

## 2025-01-07 RX ADMIN — COLCHICINE 0.6 MG: 0.6 TABLET ORAL at 09:20

## 2025-01-07 RX ADMIN — GUAIFENESIN 1200 MG: 600 TABLET, EXTENDED RELEASE ORAL at 20:48

## 2025-01-07 RX ADMIN — ALBUTEROL SULFATE 2.5 MG: 2.5 SOLUTION RESPIRATORY (INHALATION) at 10:14

## 2025-01-07 RX ADMIN — COLCHICINE 0.6 MG: 0.6 TABLET ORAL at 20:48

## 2025-01-07 RX ADMIN — ALBUTEROL SULFATE 2.5 MG: 2.5 SOLUTION RESPIRATORY (INHALATION) at 18:55

## 2025-01-07 RX ADMIN — FAMOTIDINE 20 MG: 20 TABLET, FILM COATED ORAL at 20:48

## 2025-01-07 RX ADMIN — OXYCODONE HYDROCHLORIDE AND ACETAMINOPHEN 1000 MG: 500 TABLET ORAL at 20:48

## 2025-01-07 RX ADMIN — GUAIFENESIN 1200 MG: 600 TABLET, EXTENDED RELEASE ORAL at 09:20

## 2025-01-07 RX ADMIN — OXYCODONE HYDROCHLORIDE AND ACETAMINOPHEN 1000 MG: 500 TABLET ORAL at 09:21

## 2025-01-07 RX ADMIN — TRIAMCINOLONE ACETONIDE 1 APPLICATION: 1 CREAM TOPICAL at 09:30

## 2025-01-07 RX ADMIN — TRIAMCINOLONE ACETONIDE 1 APPLICATION: 1 CREAM TOPICAL at 20:56

## 2025-01-07 RX ADMIN — Medication 10 ML: at 09:24

## 2025-01-07 RX ADMIN — Medication 4 ML: at 15:10

## 2025-01-07 RX ADMIN — DIBASIC SODIUM PHOSPHATE, MONOBASIC POTASSIUM PHOSPHATE AND MONOBASIC SODIUM PHOSPHATE 2 TABLET: 852; 155; 130 TABLET ORAL at 20:48

## 2025-01-07 RX ADMIN — SODIUM CHLORIDE 100 ML/HR: 9 INJECTION, SOLUTION INTRAVENOUS at 03:20

## 2025-01-07 RX ADMIN — DIBASIC SODIUM PHOSPHATE, MONOBASIC POTASSIUM PHOSPHATE AND MONOBASIC SODIUM PHOSPHATE 2 TABLET: 852; 155; 130 TABLET ORAL at 11:41

## 2025-01-07 RX ADMIN — Medication 10 ML: at 20:50

## 2025-01-07 RX ADMIN — Medication 4 ML: at 10:13

## 2025-01-07 RX ADMIN — NEBIVOLOL 5 MG: 5 TABLET ORAL at 09:22

## 2025-01-07 RX ADMIN — ALBUTEROL SULFATE 2.5 MG: 2.5 SOLUTION RESPIRATORY (INHALATION) at 06:34

## 2025-01-07 RX ADMIN — Medication 4 ML: at 18:56

## 2025-01-07 RX ADMIN — CARIPRAZINE 1.5 MG: 1.5 CAPSULE, GELATIN COATED ORAL at 09:22

## 2025-01-07 RX ADMIN — LITHIUM CARBONATE 1200 MG: 450 TABLET ORAL at 09:25

## 2025-01-07 RX ADMIN — ALLOPURINOL 300 MG: 100 TABLET ORAL at 09:28

## 2025-01-07 RX ADMIN — ALBUTEROL SULFATE 2.5 MG: 2.5 SOLUTION RESPIRATORY (INHALATION) at 15:06

## 2025-01-07 RX ADMIN — Medication 4 ML: at 06:33

## 2025-01-07 RX ADMIN — ALBUTEROL SULFATE 2.5 MG: 2.5 SOLUTION RESPIRATORY (INHALATION) at 23:32

## 2025-01-07 NOTE — PROGRESS NOTES
Consult Daily Progress Note  [facility]   01/07/25      Patient Name:  Wang Tee  MRN:  [id number]   YOB: 1975  Age: 49 y.o.  Sex: male  LOS: 3    Reason for Consult:  Dyspnea, mucous plugging    Hospital Course:   49-year-old male with a history of bipolar disorder, obesity, vocal cord dystonia with chronic tracheostomy who presented to the emergency department with difficulty breathing.  Patient was recently diagnosed with influenza A and with thick sputum/blood production from his trachea.    Interval History:  No acute events overnight  Tracheostomy 6 L   Ultrasound liver yesterday with hepatic steatosis possible sludge without any evidence of cholecystitis  Denies any acute complaints  Breathing is stable  Easily bringing up sputum  No nausea or vomiting  Father is at bedside    Physical Exam:  Vitals:    01/07/25 0740   BP: 137/94   Pulse: 65   Resp: 17   Temp: 98.2 °F (36.8 °C)   SpO2: 99%       Intake/Output    Intake/Output Summary (Last 24 hours) at 1/7/2025 1013  Last data filed at 1/7/2025 0910  Gross per 24 hour   Intake 480 ml   Output --   Net 480 ml       General: Alert, nontoxic, NAD  HEENT: NC/AT, EOMI, MMM  Neck: Tracheostomy  Cardiac: RRR, no murmur, gallops, rubs  Pulmonary: Clear to auscultation bilaterally, no adventitious breath sounds, normal respiratory effort  GI: Soft, non-tender, non-distended, normal bowel sounds  Extremities: Warm, well perfused, no LE edema  Skin: no visible rash  Neuro: CN II - XII grossly intact  Psychiatry: Normal mood and affect      Data Review:  Results from last 7 days   Lab Units 01/07/25  0727 01/06/25  0731 01/05/25 0225 01/04/25  1905   WBC 10*3/mm3 9.39 9.67 14.31* 13.49*   HEMOGLOBIN g/dL 13.3 13.7 14.1 14.6   PLATELETS 10*3/mm3 294 293 267 305     Results from last 7 days   Lab Units 01/07/25  0727 01/06/25  0949 01/05/25 0225 01/04/25  1905   SODIUM mmol/L 138 138 135* 134*   POTASSIUM mmol/L 3.8 3.6 4.3 4.4    CHLORIDE mmol/L 110* 108* 109* 108*   CO2 mmol/L 21.0* 21.0* 18.0* 15.7*   BUN mg/dL 21* 26* 37* 42*   CREATININE mg/dL 1.19 1.32* 1.34* 1.55*   GLUCOSE mg/dL 94 172* 121* 124*   CALCIUM mg/dL 9.3 9.3 9.3 9.8   MAGNESIUM mg/dL 2.2 2.3  --   --    PHOSPHORUS mg/dL 2.4* 2.5  --   --    Estimated Creatinine Clearance: 99.2 mL/min (by C-G formula based on SCr of 1.19 mg/dL).    Results from last 7 days   Lab Units 01/07/25  0727 01/06/25  0949 01/06/25  0731 01/05/25  0426 01/05/25  0225 01/04/25  1905   AST (SGOT) U/L 58* 54*  --   --   --  62*   ALT (SGPT) U/L 112* 114*  --   --   --  107*   PROCALCITONIN ng/mL  --   --   --   --  0.14  --    LACTATE mmol/L  --   --   --  1.1  --   --    PLATELETS 10*3/mm3 294  --  293  --  267 305               Imaging:  Reviewed chest images personally from past 3 days    ASSESSMENT  /  PLAN:    Right mainstem obstruction with blood clot  Influenza A infection  JACIEL, improving  S/p emergent tracheostomy in 2019 for airway obstruction (suspected to be related to risperidone).  Currently on Geovani trach size 6  Leukocytosis  HTN  Bipolar 1 disorder    -Patient underwent 2 bronchoscopies with extraction of right mainstem mucous plug/blood clot with improvement in his respiratory symptoms.  -Completed course of Tamiflu for influenza  -Bronchodilator therapy with albuterol and hypertonic saline.  Helping with airway clearance.  This is significant improved.  -Recommend humidified air as an outpatient to prevent recurrent mucous plugging. He has future plan to potentially remove the trach this month  -Anticipate stability for discharge from pulmonary standpoint in the next 24 to 48 hours.    Thank you for allowing us to participate in this patients care. Pulmonary will continue to follow.     Donnell Coleman MD  [facility]  Pulmonary and Critical Care Medicine, Interventional Pulmonology    Parts of this note may be an electronic transcription/translation of spoken  language to printed text using the Dragon dictation system.

## 2025-01-07 NOTE — PLAN OF CARE
Goal Outcome Evaluation:  Plan of Care Reviewed With: patient        Progress: no change  Outcome Evaluation: VSS. Appetite good. No complaints of pain this shift. Productive cough thick ocassional blood tinged sputum from trach. Pt refuses suctioning. Up to bathroom with assist of father. Adjusted meds per family request. IV fluids stopped per order.Low phos treated. Oxygen adjusted as tolerated , pt does not use O2 at home.

## 2025-01-07 NOTE — PROGRESS NOTES
Name: Wang Tee ADMIT: 2025   : 1975  PCP: Julia Knapp APRN    MRN: [id number] LOS: 3 days   AGE/SEX: 49 y.o. male  ROOM: [facility]     Subjective   Subjective   Patient seen this morning, father present at bedside.  No acute events overnight.  Continues to feel better.  Father wants vitamin C to be administered twice daily.  Patient reports overall feeling better.      Review of Systems   As above  Objective   Objective   Vital Signs  Temp:  [98.2 °F (36.8 °C)] 98.2 °F (36.8 °C)  Heart Rate:  [65-72] 72  Resp:  [17-22] 18  BP: (130-143)/() 137/94  SpO2:  [98 %-100 %] 100 %  on  Flow (L/min) (Oxygen Therapy):  [4-6] 4;   Device (Oxygen Therapy): tracheostomy collar  Body mass index is 40.8 kg/m².  Physical Exam    General: Alert, laying in bed, not in distress,  HEENT: Normocephalic, atraumatic, tracheostomy in place  CV: Regular rate and rhythm, no murmurs rubs or gallops  Lungs: Clear to auscultation bilaterally, no crackles or wheezes  Abdomen: Soft, nontender, nondistended  Extremities: No significant peripheral edema , no cyanosis     Results Review     I reviewed the patient's new clinical results.  Results from last 7 days   Lab Units 25  0725  0731 25  1905   WBC 10*3/mm3 9.39 9.67 14.31* 13.49*   HEMOGLOBIN g/dL 13.3 13.7 14.1 14.6   PLATELETS 10*3/mm3 294 293 267 305     Results from last 7 days   Lab Units 25  0727 25  0949 25  1905   SODIUM mmol/L 138 138 135* 134*   POTASSIUM mmol/L 3.8 3.6 4.3 4.4   CHLORIDE mmol/L 110* 108* 109* 108*   CO2 mmol/L 21.0* 21.0* 18.0* 15.7*   BUN mg/dL 21* 26* 37* 42*   CREATININE mg/dL 1.19 1.32* 1.34* 1.55*   GLUCOSE mg/dL 94 172* 121* 124*   Estimated Creatinine Clearance: 99.2 mL/min (by C-G formula based on SCr of 1.19 mg/dL).  Results from last 7 days   Lab Units 25  0727 25  0949 25  1905   ALBUMIN g/dL 3.5 3.6 3.9   BILIRUBIN mg/dL 1.5* 1.3* 0.7   ALK  "PHOS U/L 77 78 79   AST (SGOT) U/L 58* 54* 62*   ALT (SGPT) U/L 112* 114* 107*     Results from last 7 days   Lab Units 01/07/25  0727 01/06/25  0949 01/05/25  0225 01/04/25  1905   CALCIUM mg/dL 9.3 9.3 9.3 9.8   ALBUMIN g/dL 3.5 3.6  --  3.9   MAGNESIUM mg/dL 2.2 2.3  --   --    PHOSPHORUS mg/dL 2.4* 2.5  --   --      Results from last 7 days   Lab Units 01/05/25  0426 01/05/25  0225   PROCALCITONIN ng/mL  --  0.14   LACTATE mmol/L 1.1  --      COVID19   Date Value Ref Range Status   01/02/2025 Not Detected Not Detected - Ref. Range Final   11/05/2021 Not Detected Not Detected - Ref. Range Final     No results found for: \"HGBA1C\", \"POCGLU\"        US Liver  Narrative: US LIVER-     Date of Exam: 1/6/2025 6:04 PM     Indication: Transaminitis.     Comparison: CT chest 1/5/2025. Ultrasound 9/2/2015.     Technique: Multiplanar grayscale color flow imaging of the right upper  quadrant of the abdomen was performed.     FINDINGS:  Diffusely increased hepatic echogenicity, suggesting hepatic steatosis.  Ill-defined region of decreased hepatic attenuation near the gallbladder  fossa suggests focal fatty sparing. The liver measures 15.8 cm in  maximal measured length.  No intrahepatic biliary duct dilatation. The  portal vein demonstrates normal hepatopetal flow.     Subtle hyperdensity within the dependent lumen of the otherwise  unremarkable appearing gallbladder could represent sludge or small  stones. No gallbladder wall thickening or pericholecystic fluid. The  common duct measures 0.5 cm in diameter. The ultrasound technologist  reports a negative Gonsalez's sign.     Visualized portions of the pancreatic head and body are within normal  limits.     The right kidney is normal in ultrasound appearance. No solid right  renal mass, shadowing stone, or hydronephrosis. The right kidney  measures 12.4 cm in length.     Visualized portions of the abdominal aorta and IVC are unremarkable.     No free fluid is seen in the right " upper quadrant.     Impression:    1. Diffusely increased hepatic echogenicity, likely reflecting hepatic  steatosis, with suspected focal fatty sparing near the gallbladder  fossa.  2. Possible sludge or small stones within the gallbladder. No specific  ultrasound evidence of acute cholecystitis.     This report was finalized on 1/6/2025 8:24 PM by Brendon De Los Santos MD on  Workstation: [id number]       Scheduled Medications  albuterol, 2.5 mg, Nebulization, Q6H - RT  allopurinol, 300 mg, Oral, Daily  vitamin C, 1,000 mg, Oral, BID  Cariprazine HCl, 1.5 mg, Oral, Daily  colchicine, 0.6 mg, Oral, BID  guaiFENesin, 1,200 mg, Oral, BID  lithium, 1,200 mg, Oral, Daily  nebivolol, 5 mg, Oral, Daily  phosphorus, 500 mg, Oral, BID  sodium chloride, 10 mL, Intravenous, Q12H  sodium chloride, 4 mL, Nebulization, 4x Daily  triamcinolone, 1 application , Topical, BID    Infusions   Diet  Diet: Regular/House; Fluid Consistency: Thin (IDDSI 0)    I have personally reviewed     [x]  Laboratory   [x]  Microbiology   [x]  Radiology   [x]  EKG/Telemetry  []  Cardiology/Vascular   []  Pathology    []  Records       Assessment/Plan     Active Hospital Problems    Diagnosis  POA    **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     Right mainstem obstruction with blood clot  Influenza A infection  -CT chest with material noted in the right mainstem bronchus most likely mass lesion, no obvious consolidation or infiltrate  -Status post 2 bronchoscopies, with extraction of right mainstem mucous plug/blood clot, feeling better afterwards.  -Completed Tamiflu for influenza  -Bronchodilators, hypertonic saline  -Modified oxygen recommended to prevent  recurrent plugging  -Symptoms improving.  May need home oxygen.  -Wean off O2 as able.  O2 saturation goal 90% and above       Elevated transaminases  BRISCOE  -AST and ALT elevated on admission labs  -May be secondary to viral infection as above  -Hepatitis panel negative  -Liver ultrasound- Diffusely increased hepatic echogenicity, likely reflecting hepatic  steatosis, with suspected focal fatty sparing near the gallbladder fossa.2. Possible sludge or small stones within the gallbladder. No specificultrasound evidence of acute cholecystitis.  -Discussed findings.  Encouraged diet, and weight loss.  -Monitor dosing.     JACIEL  Metabolic acidosis  -His baseline creatinine appears to be around 1  -Creatinine and bicarb improved  -Discontinue IV fluids.  Monitor daily BMP.     Hypertension  -Blood pressures have been stable. Continue continue nebivolol  -Hold Losartan secondary to JACIEL  Blood pressure acutely stable.     Bipolar I disorder  -Continue lithium and Vraylar  -Lithium was elevated at 1.3.  Just above the upper limit of normal.  No symptoms.  Going to continue current dosing for now.  -Will recheck level in a.m.        SCDs for DVT prophylaxis.  Full code.  Discussed with patient.  Discussed with father  Discussed in  multidisciplinary rounds  Likely stable to be discharged in 2 days  Expected Discharge Date: 1/9/2025; Expected Discharge Time:        Copied text in this note has been reviewed and is accurate as of 01/07/25.         Dictated utilizing Dragon dictation        Naomi Lundy MD  [facility]  01/07/25  12:16 EST

## 2025-01-07 NOTE — PAYOR COMM NOTE
"Freya Ennis (49 y.o. Male)        PLEASE SEE ATTACHED FOR INPT AUTH.     REF#[id number]     PLEASE CALL  OR  [phone] WITH INPT AUTH.    THANK YOU    JONNIE ROYAL LPN CCP   Date of Birth   1975    Social Security Number       [place]   [address] [facility] [place] [address]    Home Phone   [phone]    MRN   [id number]       Gnosticist   None    Marital Status   Single                            Admission Date   1/4/25    Admission Type   Emergency    Admitting Provider   Callum Berry MD    Attending Provider   Naomi Lundy MD    Department, Room/Bed   [address], S621/1       Discharge Date       Discharge Disposition       Discharge Destination                                 Attending Provider: Naomi Lundy MD    Allergies: Quetiapine, Haloperidol, Amlodipine-valsartan-hctz, Aripiprazole, Olanzapine, Risperidone, Sulfa Antibiotics, Sulfamethoxazole-trimethoprim, Valproic Acid, Amlodipine, Bupropion    Isolation: Droplet   Infection: Influenza (01/02/25)   Code Status: CPR    Ht: 175.3 cm (69.02\")   Wt: 125 kg (276 lb 6.4 oz)    Admission Cmt: None   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]                   Active Insurance as of 1/4/2025       Primary Coverage       Payor Plan Insurance Group Employer/Plan Group    [facility] BL[id number]       Payor Plan Address Payor Plan Phone Number Payor Plan Fax Number Effective Dates    [address]   1/13/2024 - None Entered    [place] [address]         Subscriber Name Subscriber Birth Date Member ID       FREYA ENNIS 1975 [id number]                     Emergency Contacts        (Rel.) Home Phone Work Phone Mobile Phone    Latricia Ennis (Mother) [phone] -- --    Ghassan Sargent (Father) -- -- [phone]              [place]: NPI [id number]  Tax ID [id number]     History & Physical        Isabelle Wiggins APRN at 01/04/25 2309       " "Attestation signed by Callum Berry MD at 01/05/25 0113      Brief Attending Admission Attestation   Addendum: I have reviewed the history and plan as obtained by JUANITA Hightower and performed my own independent history. I have personally examined the patient and conducted greater than 50% of medical decision making. My exam confirms her physical findings and I agree with the plan as listed below, with the addition of the following:     I have personally reviewed:  [x]  Laboratory  [x]  Old records  [x]  Radiology   []  EKG/Telemetry   [x]  Microbiology    [x]  Cardiology/Vascular   []  Pathology     Attending Physical Exam  Temp:  [97.7 °F (36.5 °C)] 97.7 °F (36.5 °C)  Heart Rate:  [59-71] 60  Resp:  [22-26] 22  BP: (128-149)/(80-89) 138/84  Constitutional: alert, cooperative, and no distress  Neck: tracheostomy  Respiratory: clear to auscultation, unlabored  Cardiovascular: regular rate and rhythm, no murmur  Abdominal: soft, non-tender, non-distended; BS normal  Musculoskeletal: no edema  Neurological: alert and oriented x 3    Brief HPI    49 y.o. male w/ Tracheostomy due to vocal cord dysfunction, HTN presenting with dyspnea.  Patient recently diagnosed with influenza A, has felt as though he has had \"something in his windpipe.\"  He has had increased cough compared to normal.  otherwise no fevers or chills.    Remainder of detailed HPI is as noted below and has been reviewed by me for completeness.    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       Assessment/Plan    Patient influenza A positive.  Pulmonology did bedside bronchoscopy in the ED and noted large intraluminal tracheal mass.  This was unable to be completely excised and plan is to admit to " have bronchoscopy done in OR suite tomorrow.  Plan to treat with Tamiflu given his high risk status.  Will hydrate overnight for JACIEL.     See below for additional assessment and plan developed with APRN which I have reviewed.      Electronically signed by Callum Berry MD, 1/5/2025, 01:10 EST.     Documentation delayed- date of service on 01/04/2025                        Patient Name:  Wang Tee  YOB: 1975  MRN:  [id number]  Admit Date:  1/4/2025  Patient Care Team:  Julia Knapp APRN as PCP - General (Family Medicine)      Subjective  History Present Illness     Chief Complaint   Patient presents with    Shortness of Breath       Mr. Tee is a 49 y.o. non-smoker with a history of tracheostomy due to vocal cord dysfunction, Bipolar I disorder, hypertension, and pericarditis that presents to [facility] complaining of shortness of breath. He reports he was diagnosed with Influenza A three days ago and he has had a persistent cough. He states he feels like he has mucus stuck in his larynx that he cannot cough up. He reports that this mucus plugging is causing his shortness of breath. He denies fever, chills, and chest pain. Work up in the ED revealed sodium 134, CO2 15.7, creatinine 1.55, BUN 42, glucose 124, AST 62, , and WBC 13.49. A chest x-ray showed a stable cardiomediastinal silhouette when compared to his previous scan and low lung volumes. The possibility of a perihilar infiltrate cannot be excluded. While in the ED, the patient's trach was suctioned by respiratory therapy and he was found to have some minimal bleeding. 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 The mass could not be completely excised, so he is being admitted to have a bronchoscopy procedure tomorrow.        History of Present  Illness  Review of Systems   Constitutional:  Negative for chills and fever.   HENT:  Negative for congestion and sore throat.    Eyes:  Negative for photophobia and visual disturbance.   Respiratory:  Positive for cough and shortness of breath. Negative for chest tightness, wheezing and stridor.    Cardiovascular:  Negative for chest pain, palpitations and leg swelling.   Gastrointestinal:  Negative for abdominal pain, nausea and vomiting.   Musculoskeletal:  Negative for arthralgias and myalgias.   Neurological:  Negative for dizziness, weakness, light-headedness, numbness and headaches.        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    Elevated liver enzymes     Gout     Gout     History of medical problems 1992    Bipolar    Hypertension      Past Surgical History:   Procedure Laterality Date    TRACHEOSTOMY       Family History   Problem Relation Age of Onset    Hypertension Mother     Arthritis Mother     Diabetes Mother     Kidney disease Mother     Hypertension Father     Anxiety disorder Other     Bipolar disorder Other      Social History     Tobacco Use    Smoking status: Never    Smokeless tobacco: Never   Vaping Use    Vaping status: Never Used   Substance Use Topics    Alcohol use: Not Currently     Comment: social    Drug use: No     (Not in a hospital admission)    Allergies:    Allergies   Allergen Reactions    Quetiapine Other (See Comments)     Becomes violent when awakening    Haloperidol Other (See Comments)     Locked up jaw    Amlodipine-Valsartan-Hctz Swelling    Aripiprazole Swelling    Olanzapine Unknown - Low Severity    Risperidone Other (See Comments)    Sulfa Antibiotics     Sulfamethoxazole-Trimethoprim     Valproic Acid Irritability    Amlodipine Swelling and Palpitations     SWELLING OF ANKLES    Bupropion Other (See Comments)        Objective   Objective     Vital Signs  Temp:  [97.7 °F (36.5 °C)] 97.7 °F (36.5 °C)  Heart Rate:  [59-71] 63  Resp:  [22-26] 22  BP: (128-130)/(80-89) 128/89  SpO2:  [91 %-98 %] 96 %  on  Flow (L/min) (Oxygen Therapy):  [4-6] 6;   Device (Oxygen Therapy): tracheostomy collar  There is no height or weight on file to calculate BMI.    Physical Exam  Vitals and nursing note reviewed.   Constitutional:       Appearance: He is ill-appearing.   HENT:      Head: Normocephalic and atraumatic.      Nose: Nose normal.      Mouth/Throat:      Mouth: Mucous membranes are moist.      Pharynx: Oropharynx is clear.   Eyes:      Extraocular Movements: Extraocular movements intact.      Conjunctiva/sclera: Conjunctivae normal.   Neck:      Comments: Tracheostomy  Cardiovascular:      Rate and Rhythm: Normal rate and regular rhythm.      Pulses: Normal pulses.      Heart sounds: Normal heart sounds.   Pulmonary:      Effort: Pulmonary effort is normal.      Breath sounds: Examination of the right-lower field reveals decreased breath sounds. Examination of the left-lower field reveals decreased breath sounds. Decreased breath sounds present.   Abdominal:      General: Bowel sounds are normal.      Palpations: Abdomen is soft.   Musculoskeletal:         General: Normal range of motion.      Cervical back: Normal range of motion and neck supple.   Skin:     General: Skin is warm and dry.   Neurological:      General: No focal deficit present.      Mental Status: He is alert and oriented to person, place, and time.   Psychiatric:         Mood and Affect: Mood normal.         Behavior: Behavior normal.         Results Review:  I reviewed the patient's new clinical results.  I reviewed the patient's new imaging results and agree with the interpretation.  I reviewed the patient's other test results and agree with the interpretation  I personally viewed and interpreted the patient's EKG/Telemetry data  Discussed with ED  provider.    Lab Results (last 24 hours)       Procedure Component Value Units Date/Time    Comprehensive Metabolic Panel [[id number]]  (Abnormal) Collected: 01/04/25 1905    Specimen: Blood Updated: 01/04/25 2022     Glucose 124 mg/dL      BUN 42 mg/dL      Creatinine 1.55 mg/dL      Sodium 134 mmol/L      Potassium 4.4 mmol/L      Chloride 108 mmol/L      CO2 15.7 mmol/L      Calcium 9.8 mg/dL      Total Protein 7.3 g/dL      Albumin 3.9 g/dL      ALT (SGPT) 107 U/L      AST (SGOT) 62 U/L      Alkaline Phosphatase 79 U/L      Total Bilirubin 0.7 mg/dL      Globulin 3.4 gm/dL      A/G Ratio 1.1 g/dL      BUN/Creatinine Ratio 27.1     Anion Gap 10.3 mmol/L      eGFR 54.9 mL/min/1.73     Narrative:      GFR Categories in Chronic Kidney Disease (CKD)      GFR Category          GFR (mL/min/1.73)    Interpretation  G1                     90 or greater         Normal or high (1)  G2                      60-89                Mild decrease (1)  G3a                   45-59                Mild to moderate decrease  G3b                   30-44                Moderate to severe decrease  G4                    15-29                Severe decrease  G5                    14 or less           Kidney failure          (1)In the absence of evidence of kidney disease, neither GFR category G1 or G2 fulfill the criteria for CKD.    eGFR calculation 2021 CKD-EPI creatinine equation, which does not include race as a factor    CBC & Differential [[id number]]  (Abnormal) Collected: 01/04/25 1905    Specimen: Blood Updated: 01/04/25 2016    Narrative:      The following orders were created for panel order CBC & Differential.  Procedure                               Abnormality         Status                     ---------                               -----------         ------                     CBC Auto Differential[[id number]]        Abnormal            Final result                 Please view results for these tests on the individual  orders.    CBC Auto Differential [[id number]]  (Abnormal) Collected: 01/04/25 1905    Specimen: Blood Updated: 01/04/25 2016     WBC 13.49 10*3/mm3      RBC 4.76 10*6/mm3      Hemoglobin 14.6 g/dL      Hematocrit 42.0 %      MCV 88.2 fL      MCH 30.7 pg      MCHC 34.8 g/dL      RDW 13.0 %      RDW-SD 41.7 fl      MPV 9.3 fL      Platelets 305 10*3/mm3      nRBC 0.0 /100 WBC     Manual Differential [[id number]]  (Abnormal) Collected: 01/04/25 1905    Specimen: Blood Updated: 01/04/25 2025     Neutrophil % 92.0 %      Lymphocyte % 4.0 %      Monocyte % 4.0 %      Eosinophil % 0.0 %      Basophil % 0.0 %      Neutrophils Absolute 12.41 10*3/mm3      Lymphocytes Absolute 0.54 10*3/mm3      Monocytes Absolute 0.54 10*3/mm3      Eosinophils Absolute 0.00 10*3/mm3      Basophils Absolute 0.00 10*3/mm3      RBC Morphology Normal     WBC Morphology Normal     Platelet Morphology Normal            Imaging Results (Last 24 Hours)       Procedure Component Value Units Date/Time    XR Chest 1 View [[id number]] Collected: 01/04/25 1902     Updated: 01/04/25 1954    Narrative:      XR CHEST 1 VW-     CLINICAL HISTORY:  Cough, influenza A     FINDINGS:     The lung volumes are low. A tracheostomy tube is again noted. The  cardiomediastinal silhouette is prominent size and stable when compared  to the prior study dated 1/2/2020. This is likely accentuated by the low  lung volumes. The perihilar markings are prominent and I cannot entirely  exclude the possibility of a perihilar infiltrate. Otherwise, there is  no convincing evidence for an acute infiltrate.           This report was finalized on 1/4/2025 7:51 PM by Dr. Gordon Meng M.D  on Workstation: [id number]               Results for orders placed during the hospital encounter of 11/05/21    Adult Transthoracic Echo Complete W/ Cont if Necessary Per Protocol    Interpretation Summary  · Left ventricular ejection fraction appears to be 61 - 65%. Left ventricular systolic  function is normal.  · Left ventricular wall thickness is consistent with mild to moderate concentric hypertrophy  · Left ventricular diastolic function was indeterminate.  · The right ventricular cavity is mildly dilated. Normal right ventricular systolic function noted.  · Interatrial septal aneurysm present.  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 Continue oxygen to keep sats greater than or equal to 92%  -He has a mild leukocytosis which is most likely reactive. Will defer any antibiotics to pulmonology group  -Check lactate  -CT chest ordered per pulmonology    Influenza A  -Symptomatic treatment  -Continue Tamiflu to complete 5 day course    JACIEL  -His baseline creatinine appears to be around 1  -IVF overnight  -Avoid nephrotoxins  -Repeat labs in AM    Hypertension  -Blood pressures have been stable. Continue continue nebivolol  -Hold Losartan secondary to JACIEL  -Monitor    Bipolar I disorder  -Continue lithium and Vraylar  -Check lithium level    -I discussed the patients findings and my recommendations with patient.    VTE Prophylaxis - SCDs.  Code Status - Full code.       JUANITA Dubose  [facility]  01/04/25  23:20 EST      Electronically signed by Callum Berry MD at 01/05/25 0113          Emergency Department Notes        Anabelle Bunch, RN at 01/04/25 2250          Nursing report ED to  floor  Wang Tee  49 y.o.  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 Diagnoses of Tracheostomy status, Dehydration, and Influenza A were also pertinent to this visit.    Code status:   Current Code Status       Date Active Code Status Order ID Comments User Context       Prior            Allergies:   Quetiapine, Haloperidol, Amlodipine-valsartan-hctz, Aripiprazole, Olanzapine, Risperidone, Sulfa antibiotics, Sulfamethoxazole-trimethoprim, Valproic acid, Amlodipine, and Bupropion    Isolation:   Droplet    Intake and Output  No intake or output data in the 24 hours ending 01/04/25 2250    Weight:   There were no vitals filed for this visit.    Most recent vitals:   Vitals:    01/04/25 2110 01/04/25 2122 01/04/25 2137 01/04/25 2210   BP:   128/86    BP Location:       Patient Position:       Pulse: 63 61 64 59   Resp:   24    Temp:       SpO2: 93% 96% 91% 94%       Active LDAs/IV Access:   Lines, Drains & Airways       Active LDAs       Name Placement date Placement time Site Days    Peripheral IV 01/04/25 2110 Left Antecubital 01/04/25 2110  Antecubital  less than 1                    Labs (abnormal labs have a star):   Labs Reviewed   COMPREHENSIVE METABOLIC PANEL - Abnormal; Notable for the following components:       Result Value    Glucose 124 (*)     BUN 42 (*)     Creatinine 1.55 (*)     Sodium 134 (*)     Chloride 108 (*)     CO2 15.7 (*)     ALT (SGPT) 107 (*)     AST (SGOT) 62 (*)     BUN/Creatinine Ratio 27.1 (*)     eGFR 54.9 (*)     All other components within normal limits    Narrative:     GFR Categories in Chronic Kidney Disease (CKD)      GFR Category          GFR (mL/min/1.73)    Interpretation  G1                     90 or greater         Normal or high (1)  G2                       60-89                Mild decrease (1)  G3a                   45-59                Mild to moderate decrease  G3b                   30-44                Moderate to severe decrease  G4                    15-29                Severe decrease  G5                    14 or less           Kidney failure          (1)In the absence of evidence of kidney disease, neither GFR category G1 or G2 fulfill the criteria for CKD.    eGFR calculation 2021 CKD-EPI creatinine equation, which does not include race as a factor   CBC WITH AUTO DIFFERENTIAL - Abnormal; Notable for the following components:    WBC 13.49 (*)     All other components within normal limits   MANUAL DIFFERENTIAL - Abnormal; Notable for the following components:    Neutrophil % 92.0 (*)     Lymphocyte % 4.0 (*)     Monocyte % 4.0 (*)     Eosinophil % 0.0 (*)     Neutrophils Absolute 12.41 (*)     Lymphocytes Absolute 0.54 (*)     All other components within normal limits   CBC AND DIFFERENTIAL    Narrative:     The following orders were created for panel order CBC & Differential.  Procedure                               Abnormality         Status                     ---------                               -----------         ------                     CBC Auto Differential[[id number]]        Abnormal            Final result                 Please view results for these tests on the individual orders.       EKG:   No orders to display       Meds given in ED:   Medications   sodium chloride 0.9 % bolus 1,000 mL (1,000 mL Intravenous New Bag 1/4/25 2120)   midazolam (VERSED) injection 2 mg (2 mg Intravenous Given 1/4/25 2122)   fentaNYL citrate (PF) (SUBLIMAZE) injection 50 mcg (50 mcg Intravenous Given 1/4/25 2122)       Imaging results:  No radiology results for the last day    Ambulatory status:   - ad ray    Social issues:   Social History     Socioeconomic History    Marital status: Single   Tobacco Use    Smoking status: Never    Smokeless tobacco: Never    Vaping Use    Vaping status: Never Used   Substance and Sexual Activity    Alcohol use: Not Currently     Comment: social    Drug use: No    Sexual activity: Not Currently     Partners: Male       Peripheral Neurovascular  Peripheral Neurovascular (Adult)  Peripheral Neurovascular WDL: WDL    Neuro Cognitive  Neuro Cognitive (Adult)  Cognitive/Neuro/Behavioral WDL: WDL, level of consciousness, orientation  Level of Consciousness: Alert  Orientation: oriented x 4    Learning       Respiratory  Respiratory  Airway WDL: .WDL except, airway symptoms  Airway Symptoms: artificial airway in place (Tracheostomy in place - chronic)  Respiratory WDL  Respiratory WDL: .WDL except, rhythm/pattern, expansion/retractions, cough  Rhythm/Pattern, Respiratory: labored, shortness of breath, tachypneic  Cough Frequency: frequent  Cough Type: bronchospastic, no productive sputum  Respiratory Secretions Assessment  Secretions Amount: small  Secretions Color: red  Secretions Characteristics: thin    Abdominal Pain       Pain Assessments  Pain (Adult)  (0-10) Pain Rating: Rest: 3  Pain Location: other (see comments) (trach)  Pain Onset/Duration: today  Pain Description: constant, aching  Pain Management Interventions: pain medication given  Response to Pain Interventions: nonverbal indicators absent/decreased  Pain Assessment Additional Detail  Pain Onset/Duration: today    NIH Stroke Scale       Anabelle Bunch RN  01/04/25 22:50 EST      Electronically signed by Anabelle Bunch RN at 01/04/25 2250       Chito Perkins MD at 01/04/25 1817           EMERGENCY DEPARTMENT ENCOUNTER  Room Number:  30/30  PCP: Julia Knapp APRN  Independent Historians: Patient      HPI:  Chief Complaint: had concerns including Shortness of Breath.     A complete HPI/ROS/PMH/PSH/SH/FH are unobtainable due to:   Chronic or social conditions impacting patient care (Social Determinants of Health):       Context: Wang Tee is a 49 y.o. male with a  medical history of hypertension, bipolar disorder, obesity, anxiety who presents to the ED c/o acute trouble breathing.  Patient was recent diagnosed with influenza A.  He feels like he has some blockages in his trachea.  Patient does have history of chronic tracheostomy secondary to vocal cord dysfunction.  He has had some cough and upper airway drainages.  He feels like he has some mucous plugging and has trouble getting it up.  Denies recent fever.  Denies chest pain.      Review of prior external notes (non-ED) -and- Review of prior external test results outside of this encounter:     I reviewed prior medical records note patient was hospitalized in November of this year with chest pain.  Chronic conditions include hypertension, bipolar disorder and anxiety.    Prescription drug monitoring program review:         PAST MEDICAL HISTORY  Active Ambulatory Problems     Diagnosis Date Noted    Abnormal liver enzymes 05/03/2016    Primary hypertriglyceridemia 05/03/2016    Gout 05/03/2016    Adiposity 05/03/2016    Bipolar I disorder 05/03/2016    Vitamin D deficiency 08/23/2016    IFG (impaired fasting glucose) 08/23/2016    Chest pain 11/05/2021    Acute pericarditis 11/24/2021    Focal dystonia 07/05/2018    History of pericarditis 11/03/2024    Essential hypertension 11/03/2024    Obesity (BMI 30-39.9) 11/03/2024    Dyspnea 11/03/2024    Tracheostomy status 11/03/2024     Resolved Ambulatory Problems     Diagnosis Date Noted    No Resolved Ambulatory Problems     Past Medical History:   Diagnosis Date    Abnormal serum thyroxine (T4) level     Bipolar affective     Depression 1992    Disorder of vocal cord     Elevated liver enzymes     History of medical problems 1992    Hypertension          PAST SURGICAL HISTORY  Past Surgical History:   Procedure Laterality Date    TRACHEOSTOMY           FAMILY HISTORY  Family History   Problem Relation Age of Onset    Hypertension Mother     Arthritis Mother     Diabetes  Mother     Kidney disease Mother     Hypertension Father     Anxiety disorder Other     Bipolar disorder Other          SOCIAL HISTORY  Social History     Socioeconomic History    Marital status: Single   Tobacco Use    Smoking status: Never    Smokeless tobacco: Never   Vaping Use    Vaping status: Never Used   Substance and Sexual Activity    Alcohol use: Not Currently     Comment: social    Drug use: No    Sexual activity: Not Currently     Partners: Male         ALLERGIES  Quetiapine, Haloperidol, Amlodipine-valsartan-hctz, Aripiprazole, Olanzapine, Risperidone, Sulfa antibiotics, Sulfamethoxazole-trimethoprim, Valproic acid, Amlodipine, and Bupropion      REVIEW OF SYSTEMS  Review of Systems   Constitutional:  Negative for fever.   HENT:  Positive for congestion.    Respiratory:  Positive for cough. Negative for shortness of breath.    Cardiovascular:  Negative for chest pain.   All other systems reviewed and are negative.    Included in HPI  All systems reviewed and negative except for those discussed in HPI.      PHYSICAL EXAM    I have reviewed the triage vital signs and nursing notes.    ED Triage Vitals   Temp Heart Rate Resp BP SpO2   01/04/25 1804 01/04/25 1809 01/04/25 1804 01/04/25 1813 01/04/25 1807   97.7 °F (36.5 °C) 71 26 130/80 95 %      Temp src Heart Rate Source Patient Position BP Location FiO2 (%)   -- -- 01/04/25 1813 01/04/25 1813 --     Lying Right arm        Physical Exam  GENERAL: Alert well-appearing male no obvious distress.  Triage vitals reviewed notable for temperature 97.7.  Heart rate 71, blood pressure 130/80.  O2 sats 95% on room air  SKIN: Warm, dry  HENT: Normocephalic, atraumatic-tracheostomy is present in the mid neck.  There is no stridor.  EYES: no scleral icterus  CV: regular rhythm, regular rate  RESPIRATORY: normal effort, lungs clear-O2 sats mid 90s room air  ABDOMEN: soft, nontender, nondistended  MUSCULOSKELETAL: no deformity  NEURO: alert, moves all extremities,  follows commands      LAB RESULTS  Recent Results (from the past 24 hours)   Comprehensive Metabolic Panel    Collection Time: 01/04/25  7:05 PM    Specimen: Blood   Result Value Ref Range    Glucose 124 (H) 65 - 99 mg/dL    BUN 42 (H) 6 - 20 mg/dL    Creatinine 1.55 (H) 0.76 - 1.27 mg/dL    Sodium 134 (L) 136 - 145 mmol/L    Potassium 4.4 3.5 - 5.2 mmol/L    Chloride 108 (H) 98 - 107 mmol/L    CO2 15.7 (L) 22.0 - 29.0 mmol/L    Calcium 9.8 8.6 - 10.5 mg/dL    Total Protein 7.3 6.0 - 8.5 g/dL    Albumin 3.9 3.5 - 5.2 g/dL    ALT (SGPT) 107 (H) 1 - 41 U/L    AST (SGOT) 62 (H) 1 - 40 U/L    Alkaline Phosphatase 79 39 - 117 U/L    Total Bilirubin 0.7 0.0 - 1.2 mg/dL    Globulin 3.4 gm/dL    A/G Ratio 1.1 g/dL    BUN/Creatinine Ratio 27.1 (H) 7.0 - 25.0    Anion Gap 10.3 5.0 - 15.0 mmol/L    eGFR 54.9 (L) >60.0 mL/min/1.73   CBC Auto Differential    Collection Time: 01/04/25  7:05 PM    Specimen: Blood   Result Value Ref Range    WBC 13.49 (H) 3.40 - 10.80 10*3/mm3    RBC 4.76 4.14 - 5.80 10*6/mm3    Hemoglobin 14.6 13.0 - 17.7 g/dL    Hematocrit 42.0 37.5 - 51.0 %    MCV 88.2 79.0 - 97.0 fL    MCH 30.7 26.6 - 33.0 pg    MCHC 34.8 31.5 - 35.7 g/dL    RDW 13.0 12.3 - 15.4 %    RDW-SD 41.7 37.0 - 54.0 fl    MPV 9.3 6.0 - 12.0 fL    Platelets 305 140 - 450 10*3/mm3    nRBC 0.0 0.0 - 0.2 /100 WBC   Manual Differential    Collection Time: 01/04/25  7:05 PM    Specimen: Blood   Result Value Ref Range    Neutrophil % 92.0 (H) 42.7 - 76.0 %    Lymphocyte % 4.0 (L) 19.6 - 45.3 %    Monocyte % 4.0 (L) 5.0 - 12.0 %    Eosinophil % 0.0 (L) 0.3 - 6.2 %    Basophil % 0.0 0.0 - 1.5 %    Neutrophils Absolute 12.41 (H) 1.70 - 7.00 10*3/mm3    Lymphocytes Absolute 0.54 (L) 0.70 - 3.10 10*3/mm3    Monocytes Absolute 0.54 0.10 - 0.90 10*3/mm3    Eosinophils Absolute 0.00 0.00 - 0.40 10*3/mm3    Basophils Absolute 0.00 0.00 - 0.20 10*3/mm3    RBC Morphology Normal Normal    WBC Morphology Normal Normal    Platelet Morphology Normal Normal          RADIOLOGY  XR Chest 1 View    Result Date: 1/4/2025  XR CHEST 1 VW-  CLINICAL HISTORY:  Cough, influenza A  FINDINGS:  The lung volumes are low. A tracheostomy tube is again noted. The cardiomediastinal silhouette is prominent size and stable when compared to the prior study dated 1/2/2020. This is likely accentuated by the low lung volumes. The perihilar markings are prominent and I cannot entirely exclude the possibility of a perihilar infiltrate. Otherwise, there is no convincing evidence for an acute infiltrate.    This report was finalized on 1/4/2025 7:51 PM by Dr. Gordon Meng M.D on Workstation: [id number]         MEDICATIONS GIVEN IN ER  Medications   sodium chloride 0.9 % bolus 1,000 mL (1,000 mL Intravenous New Bag 1/4/25 2120)   midazolam (VERSED) injection 2 mg (2 mg Intravenous Given 1/4/25 2122)   fentaNYL citrate (PF) (SUBLIMAZE) injection 50 mcg (50 mcg Intravenous Given 1/4/25 2122)         ORDERS PLACED DURING THIS VISIT:  Orders Placed This Encounter   Procedures    XR Chest 1 View    CT Chest Without Contrast Diagnostic    Comprehensive Metabolic Panel    CBC Auto Differential    Manual Differential    NPO Diet NPO Type: Strict NPO    Diet: Regular/House; Fluid Consistency: Thin (IDDSI 0)    Pulmonology (on-call MD unless specified)    [facility] (on-call MD unless specified) Details    Oxygen Therapy- Trach Mask - Humidified; FiO2; Titrate FiO2 Per SpO2; 90 - 95%    Inpatient Admission    CBC & Differential         OUTPATIENT MEDICATION MANAGEMENT:  No current Epic-ordered facility-administered medications on file.     Current Outpatient Medications Ordered in Epic   Medication Sig Dispense Refill    albuterol (PROVENTIL) (2.5 MG/3ML) 0.083% nebulizer solution Take 2.5 mg by nebulization Every 4 (Four) Hours As Needed for Wheezing. 90 each 0    allopurinol (ZYLOPRIM) 300 MG tablet Take 1 tablet by mouth Daily. 90 tablet 3    ascorbic acid (VITAMIN C) 500 MG tablet Take 1 tablet by mouth  Daily. (Patient taking differently: Take 1 tablet by mouth Daily As Needed (causes gout pain).)      cholecalciferol (VITAMIN D3) 10 MCG (400 UNIT) tablet Take  by mouth Daily.      clonazePAM (KlonoPIN) 0.5 MG tablet Take 1 tablet by mouth At Night As Needed.      co-enzyme Q-10 30 MG capsule Take  by mouth Daily.      colchicine 0.6 MG tablet Take 1 tablet by mouth 2 (Two) Times a Day. 60 tablet 2    cyclobenzaprine (FLEXERIL) 10 MG tablet Take 1 tablet by mouth 3 (Three) Times a Day As Needed. for muscle spams      doxycycline (MONODOX) 100 MG capsule Take 1 capsule by mouth 2 (Two) Times a Day. 14 capsule 0    guaiFENesin (MUCINEX) 600 MG 12 hr tablet Take 2 tablets by mouth 2 (Two) Times a Day. 30 tablet 0    hydrOXYzine (ATARAX) 25 MG tablet Take 1 tablet by mouth Daily As Needed for Anxiety.      lithium 600 MG capsule Take 1 capsule by mouth 2 (Two) Times a Day. 600 PM  0    loperamide (IMODIUM) 2 MG capsule Take 1 capsule by mouth 4 (Four) Times a Day As Needed for Diarrhea. 20 capsule 0    losartan (COZAAR) 100 MG tablet Take 1 tablet by mouth Daily. 90 tablet 2    Melatonin 10 MG tablet Take 1 tablet by mouth At Night As Needed.      methylPREDNISolone (MEDROL) 4 MG dose pack Take as directed on package instructions. 21 tablet 0    multivitamin with minerals tablet tablet Take 1 tablet by mouth Daily.      mupirocin (BACTROBAN) 2 % ointment Apply 1 Application topically to the appropriate area as directed Daily. PRN      nebivolol (BYSTOLIC) 5 MG tablet Take 1 tablet by mouth once daily 30 tablet 0    oseltamivir (TAMIFLU) 75 MG capsule Take 1 capsule by mouth 2 (Two) Times a Day for 5 days. 10 capsule 0    predniSONE (DELTASONE) 20 MG tablet 3 po daily x 1d, then 2 po daily x 2d, then 1 po daily x 2d. 9 tablet 0    triamcinolone (KENALOG) 0.1 % cream Apply 1 Application topically to the appropriate area as directed 2 (Two) Times a Day.      Vraylar 1.5 MG capsule capsule Take 1 capsule by mouth Daily.            PROCEDURES  Procedures            PROGRESS, DATA ANALYSIS, CONSULTS, AND MEDICAL DECISION MAKING  All labs have been independently interpreted by me.  All radiology studies have been reviewed by me. All EKG's have been independently viewed and interpreted by me.  Discussion below represents my analysis of pertinent findings related to patient's condition, differential diagnosis, treatment plan and final disposition.    Differential diagnosis includes but is not limited to tracheal obstruction, bacterial tracheitis.  Viral pneumonia, influenza A      ED Course as of 01/04/25 2250   Sat Jan 04, 2025   1910 Chest x-ray independently interpreted by me shows some scattered densities, left greater than right [DB]   2016 Respiratory therapy came and tried to suction from the patient's trach.  There is no obstruction within the metal trach, but there was a small amount of bleeding around the airway and some firmness.  Respiratory therapist suspected there may be some possible tissue within the tracheal lumen. [DB]   2017 Unfortunately we no longer have ENT coverage at the hospital.  Will discuss findings and management with on-call pulmonary.  Patient may need endoscopy.  Fortunately he can talk and breathe without significant difficulties and this does not seem to be emergent at this time. [DB]   2049 Lab work does show some dehydration with elevated BUN and creatinine of 42 and 1.55.  Patient also somewhat acidotic with bicarb of 15.  Will go ahead and give a 1 L fluid bolus. [DB]   2115 I discussed evaluation and treatment of this patient with Dr. Jm Araiza who will see at ED bedside. [DB]   2115 Dr. Farnsworth planning to do bedside bronchoscopy. [DB]   2144 Patient did undergo bedside bronchoscopy by Dr. Jm Araiza.  He did note a large mass within the tracheal lumen.  He does recommend admission for IV fluids and will take to the endoscopy suite for formal endoscopy probably tomorrow night.  He wants to order a CT  scan of the chest and would like me to have medicine team admit this patient. [DB]   6933 I discussed treatment evaluation this patient with Isabelle from [facility] who admit on behalf of of Dr. Hayden Berry [DB]      ED Course User Index  [DB] Chito Perkins MD             AS OF 22:50 EST VITALS:    BP - 128/86  HR - 59  TEMP - 97.7 °F (36.5 °C)  O2 SATS - 94%    COMPLEXITY OF CARE  49-year-old male with history of tracheostomy presents with upper respiratory congestion.  He feels that he has some blockage in his upper airway.    Please see ED course above my independent evaluation and interpretation of ED testing including x-ray and emergency department labs.  X-ray did not show any obvious pulmonary problems or pneumonia.  Labs did show evidence of dehydration with BUN/creatinine 42 and 1.55 and bicarb of 16.  Patient was treated with IV fluids.    Patient was seen in consultation by pulmonology, Dr. Damián Rodriguez who came down to the ED and was kind enough to perform bedside bronchoscopy.  This bronchoscopy did show intraluminal tracheal mass.  Mass was felt to be a solidification of secretions and was unable to be excised with bedside trach.  Plan to admit the patient to the hospital for IV fluids with goal to take him for formal bronchoscopy when available.    Dr. Araiza did request formal CT scan of the chest which has not been performed yet.      DIAGNOSIS  Final diagnoses:   Laryngeal mass   Tracheostomy status   Dehydration   Influenza A         DISPOSITION  ED Disposition       ED Disposition   Decision to Admit    Condition   --    Comment   Level of Care: Telemetry [5]   Diagnosis: Tracheal mass [354361]   Admitting Physician: HAYDEN BERRY [[id number]]   Attending Physician: HAYDEN BERRY [[id number]]   Certification: I Certify That Inpatient Hospital Services Are Medically Necessary For Greater Than 2 Midnights                  Please note that portions of this document were completed with a voice  recognition program.    Note Disclaimer: At [facility], we believe that sharing information builds trust and better relationships. You are receiving this note because you recently visited [facility]. It is possible you will see health information before a provider has talked with you about it. This kind of information can be easy to misunderstand. To help you fully understand what it means for your health, we urge you to discuss this note with your provider.         Chito Perkins MD  01/04/25 2250      Electronically signed by Chito Perkins MD at 01/04/25 2250       yKra Waldrop, RN at 01/04/25 1803          Pt to ER via PV from home for [facility]- pt is a trach patient and has had trouble breathing for the past few days.    Electronically signed by Kyra Waldrop, RN at 01/04/25 1807       Oxygen Therapy (since admission)       Date/Time SpO2 Device (Oxygen Therapy) Flow (L/min) (Oxygen Therapy) Oxygen Concentration (%) ETCO2 (mmHg)    01/07/25 0740 99 -- -- -- --    01/07/25 0000 -- tracheostomy collar 6 -- --    01/06/25 2318 98 tracheostomy collar -- -- --    01/06/25 2036 100 tracheostomy collar -- 28 --    01/06/25 2000 -- tracheostomy collar 6 28 --    01/06/25 1934 100 tracheostomy collar -- -- --    01/06/25 1433 100 -- -- -- --    01/06/25 1429 98 -- 6 28 --    01/06/25 1400 -- -- 6 28 --    01/06/25 1345 99 -- -- -- --    01/06/25 1115 100 -- -- -- --    01/06/25 1111 97 tracheostomy collar 6 28 --    01/06/25 1055 -- tracheostomy collar 6 28 --    01/06/25 0745 93 -- -- -- --    01/06/25 0720 97 -- -- -- --    01/06/25 0715 97 tracheostomy collar -- -- --    01/06/25 0038 -- tracheostomy collar;humidified 6 28 --    01/05/25 2032 98 tracheostomy collar;humidified 6 28 --    01/05/25 1951 -- tracheostomy collar;humidified 6 28 --    01/05/25 1454 99 tracheostomy collar 6 28 --    01/05/25 1452 -- tracheostomy collar 6 28 --    01/05/25 1341 95 room air -- -- --    01/05/25 1244 94  tracheostomy collar 5 -- --    01/05/25 1233 94 tracheostomy collar 5 -- --    01/05/25 1223 95 tracheostomy collar 5 -- --    01/05/25 1139 95 tracheostomy collar 15 -- --    01/05/25 0715 -- tracheostomy collar 10 -- --    01/05/25 0230 -- tracheostomy collar 10 28 --    01/05/25 0225 96 tracheostomy collar 10 28 --    01/05/25 0059 94 -- -- -- --    01/05/25 0001 98 -- -- -- --    01/04/25 2331 100 -- -- -- --    01/04/25 2307 96 tracheostomy collar 6 28 --    01/04/25 2301 98 nasal cannula 4 -- --    01/04/25 2210 94 -- -- -- --    01/04/25 2137 91 -- -- -- --    01/04/25 2122 96 -- -- -- --    01/04/25 2110 93 -- -- -- --    01/04/25 2000 91 -- -- -- --    01/04/25 1948 95 -- -- -- --    01/04/25 1901 92 -- -- -- --    01/04/25 1825 93 -- -- -- --    01/04/25 1809 93 -- -- -- --    01/04/25 1807 95 room air -- -- --          Intake & Output (last 3 days)         01/04 0701 01/05 0700 01/05 0701 01/06 0700 01/06 0701 01/07 0700 01/07 0701 01/08 0700    P.O.  120 480     I.V. (mL/kg)  200 (1.6)      IV Piggyback 1000       Total Intake(mL/kg) 1000 (8) 320 (2.6) 480 (3.8)     Net +1000 +320 +480             Urine Unmeasured Occurrence 1 x       Stool Unmeasured Occurrence   1 x            Lines, Drains & Airways       Active LDAs       Name Placement date Placement time Site Days    Peripheral IV 01/04/25 2110 Left Antecubital 01/04/25 2110  Antecubital  2    Surgical Airway Silver Geovani 6 01/05/25 0226  6  2    Surgical Airway Shiley Uncuffed 6 --  --  6  --              Inactive LDAs       None                  Medication Administration Report for Wang Tee as of 1/4/25 through 1/7/25     Legend:    Given Hold Not Given Due Canceled Entry Other Actions    Time Time (Time) Time Time-Action         Discontinued     Completed     Future     MAR Hold     Linked             Medications 01/04/25 01/05/25 01/06/25 01/07/25      acetaminophen (TYLENOL) tablet 650 mg  Dose: 650 mg  Freq: Every 4 Hours PRN  Route: PO  PRN Reason: Mild Pain  Start: 01/04/25 2254     Admin Instructions:   If given for fever, use fever parameter: fever greater than 100.4 °F  Based on patient request - if ordered for moderate or severe pain, provider allows for administration of a medication prescribed for a lower pain scale.    Do not exceed 4 grams of acetaminophen in a 24 hr period. Max dose of 2gm for AST/ALT greater than 120 units/L.    If given for pain, use the following pain scale:   Mild Pain = Pain Score of 1-3, CPOT 1-2  Moderate Pain = Pain Score of 4-6, CPOT 3-4  Severe Pain = Pain Score of 7-10, CPOT 5-8            Or   acetaminophen (TYLENOL) 160 MG/5ML oral solution 650 mg  Dose: 650 mg  Freq: Every 4 Hours PRN Route: PO  PRN Reason: Mild Pain  Start: 01/04/25 2254     Admin Instructions:   If given for fever, use fever parameter: fever greater than 100.4 °F  Based on patient request - if ordered for moderate or severe pain, provider allows for administration of a medication prescribed for a lower pain scale.    Do not exceed 4 grams of acetaminophen in a 24 hr period. Max dose of 2gm for AST/ALT greater than 120 units/L.    If given for pain, use the following pain scale:   Mild Pain = Pain Score of 1-3, CPOT 1-2  Moderate Pain = Pain Score of 4-6, CPOT 3-4  Severe Pain = Pain Score of 7-10, CPOT 5-8            Or   acetaminophen (TYLENOL) suppository 650 mg  Dose: 650 mg  Freq: Every 4 Hours PRN Route: RE  PRN Reason: Mild Pain  Start: 01/04/25 2254     Admin Instructions:   If given for fever, use fever parameter: fever greater than 100.4 °F  Based on patient request - if ordered for moderate or severe pain, provider allows for administration of a medication prescribed for a lower pain scale.    Do not exceed 4 grams of acetaminophen in a 24 hr period. Max dose of 2gm for AST/ALT greater than 120 units/L.    If given for pain, use the following pain scale:   Mild Pain = Pain Score of 1-3, CPOT 1-2  Moderate Pain = Pain  Score of 4-6, CPOT 3-4  Severe Pain = Pain Score of 7-10, CPOT 5-8            albuterol (PROVENTIL) nebulizer solution 0.083% 2.5 mg/3mL  Dose: 2.5 mg  Freq: Every 4 Hours PRN Route: NEBULIZATION  PRN Reason: Wheezing  Start: 01/05/25 0035     Admin Instructions:   Include Respiratory Treatment Education        1429-Given             sennosides-docusate (PERICOLACE) 8.6-50 MG per tablet 2 tablet  Dose: 2 tablet  Freq: 2 Times Daily PRN Route: PO  PRN Reason: Constipation  Start: 01/04/25 2254     Admin Instructions:   Start bowel management regimen if patient has not had a bowel movement after 12 hours.            And   polyethylene glycol (MIRALAX) packet 17 g  Dose: 17 g  Freq: Daily PRN Route: PO  PRN Reason: Constipation  PRN Comment: Use if senna-docusate is ineffective  Start: 01/04/25 2254     Admin Instructions:   Use if no bowel movement after 12 hours. Mix in 6-8 ounces of water.  Use 4-8 ounces of water, tea, or juice for each 17 gram dose.            And   bisacodyl (DULCOLAX) EC tablet 5 mg  Dose: 5 mg  Freq: Daily PRN Route: PO  PRN Reason: Constipation  PRN Comment: Use if polyethylene glycol is ineffective  Start: 01/04/25 2254     Admin Instructions:   Use if no bowel movement after 12 hours.  Swallow whole. Do not crush, split, or chew tablet.            And   bisacodyl (DULCOLAX) suppository 10 mg  Dose: 10 mg  Freq: Daily PRN Route: RE  PRN Reason: Constipation  PRN Comment: Use if bisacodyl oral is ineffective  Start: 01/04/25 2254     Admin Instructions:   Use if no bowel movement after 12 hours.  Hold for diarrhea            calcium carbonate (TUMS) chewable tablet 500 mg (200 mg elemental)  Dose: 2 tablet  Freq: 2 Times Daily PRN Route: PO  PRN Reason: Heartburn  Start: 01/04/25 2254     Admin Instructions:   One tablet contains 200 mg elemental calcium.  Take with food.            clonazePAM (KlonoPIN) tablet 0.5 mg  Dose: 0.5 mg  Freq: Nightly PRN Route: PO  PRN Reason: Anxiety  Start:  01/05/25 0036 End: 01/10/25 0035     Admin Instructions:   Caution: Look alike/sound alike drug alert. Please read the label.  Group 2 (Bent) Hazardous Drug - Reproductive Risk Only - See Handling Guide            hydrOXYzine (ATARAX) tablet 25 mg  Dose: 25 mg  Freq: Daily PRN Route: PO  PRN Reason: Anxiety  Start: 01/05/25 0036     Admin Instructions:   Caution: Look alike/sound alike drug alert            melatonin tablet 5 mg  Dose: 5 mg  Freq: Nightly PRN Route: PO  PRN Reason: Sleep  Start: 01/05/25 0036            nitroglycerin (NITROSTAT) SL tablet 0.4 mg  Dose: 0.4 mg  Freq: Every 5 Minutes PRN Route: SL  PRN Reason: Chest Pain  PRN Comment: Only if SBP Greater Than 100  Start: 01/04/25 2253     Admin Instructions:   If Pain Unrelieved After 3 Doses Notify MD  May administer up to 3 doses per episode.             ondansetron ODT (ZOFRAN-ODT) disintegrating tablet 4 mg  Dose: 4 mg  Freq: Every 6 Hours PRN Route: PO  PRN Reasons: Nausea,Vomiting  Start: 01/04/25 2254     Admin Instructions:   If BOTH ondansetron (ZOFRAN) and promethazine (PHENERGAN) are ordered use ondansetron first and THEN promethazine IF ondansetron is ineffective.  Place on tongue and allow to dissolve.            Or   ondansetron (ZOFRAN) injection 4 mg  Dose: 4 mg  Freq: Every 6 Hours PRN Route: IV  PRN Reasons: Nausea,Vomiting  Start: 01/04/25 2254     Admin Instructions:   If BOTH ondansetron (ZOFRAN) and promethazine (PHENERGAN) are ordered use ondansetron first and THEN promethazine IF ondansetron is ineffective.            sodium chloride 0.9 % flush 10 mL  Dose: 10 mL  Freq: As Needed Route: IV  PRN Reason: Line Care  Start: 01/04/25 2253            sodium chloride 0.9 % infusion 40 mL  Dose: 40 mL  Freq: As Needed Route: IV  PRN Reason: Line Care  Start: 01/04/25 2253     Admin Instructions:   Following administration of an IV intermittent medication, flush line with 40mL NS at 100mL/hr.                            Operative/Procedure Notes (all)        AraizaTarik martinez,  at 01/05/25 0401  Version 1 of 1       Procedures    1. BRONCHOSCOPY [PRO43 (Custom)]                 Bronchoscopy Procedure Note    Procedure:  Bronchoscopy, Diagnostic    Pre-Operative Diagnosis: shortness of breath, Possible obstruction of tracheostomy tube    Post-Operative Diagnosis: Obstruction of right mainstem bronchus from dried secretions/blood     Indication: Possible obstruction of tracheostomy tube    Anesthesia: Moderate Sedation    Procedure Details: Patient was consented for the procedure with all risks and benefits of the procedure explained in detail.  Patient was given the opportunity to ask questions and all concerns were answered.  The bronchocope was inserted into the main airway via the Size #6 Geovani Tracheostomy tube. An anatomical survey was done of the main airways and the subsegmental bronchus.  The findings are reported below.  No samples were taken.     Findings: The scope was advanced through the Geovani trach.  He did have connection with his trachea and there was a small amount of granulation tissue at the 12 o'clock position upon entering the airway.  Overall everything was patent upon entering the trachea.  The scope was advanced down the trachea and immediately noted was obstruction within the right mainstem bronchus from what appeared to be dried secretions/blood.  Scope was not able to be advanced past this obstruction but there were areas where air was moving through/around it.  The left mainstem bronchus was clear and the left lung showed no significant abnormalities or obstruction.  The scope was withdrawn and no complications noted    Estimated Blood Loss:  Minimal           Specimens:  None                Complications:  None; patient tolerated the procedure well.           Disposition:  admit to floor under A. Will need bronchscopy in endoscopy for the obstruction within the right mainstem.        Patient tolerated the procedure well.    While I was in the room and during my examination of the patient I wore gown, gloves, mask, eye protection and washed my hands before and after the encounter.  Proper enhanced droplet precautions and isolation precautions were taken.    Tarik Araiza DO  2025  04:02 EST      Electronically signed by Tarik Araiza DO at 25 0408       Procedures signed by Germán Warren MD at 25 1237   Version 1 of 1       Procedure Orders    1. Bronchoscopy [[id number]] ordered by Germán Warren MD at 25 1105               [Media Unavailable] Scan on 2025 1236 by Germán Warren MD: BRONCH          Electronically signed by Germán Warren MD at 25 1237       Germán Warren MD at 25 1123  Version 1 of 1         Tracheostomy tube exchange (CPT: 60406)    Indication:  -Needing larger tracheostomy for airway intervention to improve mucus clearance      Procedure note:   Patient was placed in supine position.  She was preoxygenated with 100% FiO2 with trach collar. The old Geovani trach size 6 tracheostomy tube was removed easily.  The stoma was fitting the old trach just tightly. Size 7 Shiley  cuffed tube was first attempted but I was not able to advance it through and therefore I placed a Shiley 6 cuffless tube.  No significant resistance noted.     Complications:  No major complications occurred.  Patient had coughing fits throughout the procedure and required suctioning after the insertion of the new tracheostomy.  He was connected to 100% oxygen by trach collar and will be weaned down gradually later.    Electronically signed by Germán Warren MD, 25, 11:26 AM EST.         Electronically signed by Germán Warren MD at 25 1126          Physician Progress Notes (all)        Naomi Lundy MD at 25 1342              Name: Wang Tee ADMIT: 2025   : 1975  PCP: Julia Knapp APRN    MRN: [id number]  LOS: 2 days   AGE/SEX: 49 y.o. male  ROOM: [facility]     Subjective   Subjective   Patient seen this morning, complains of.  The bedside.  Status post bronchoscopy yesterday with extraction of clot.  Doing well, reports significant improvement after bronchoscopy.  Patient and father wants vitamin C to be resumed as he takes it at home, ordered.    Review of Systems  As above  Objective   Objective   Vital Signs  Temp:  [98.1 °F (36.7 °C)-98.4 °F (36.9 °C)] 98.4 °F (36.9 °C)  Heart Rate:  [62-78] 69  Resp:  [20-24] 22  BP: (130-157)/() 155/99  SpO2:  [93 %-100 %] 100 %  on  Flow (L/min) (Oxygen Therapy):  [6] 6;   Device (Oxygen Therapy): tracheostomy collar  Body mass index is 40.8 kg/m².  Physical Exam    General: Alert, laying in bed, not in distress,  HEENT: Normocephalic, atraumatic, tracheostomy in place  CV: Regular rate and rhythm, no murmurs rubs or gallops  Lungs: Clear to auscultation bilaterally, no crackles or wheezes  Abdomen: Soft, nontender, nondistended  Extremities: No significant peripheral edema , no cyanosis     Results Review     I reviewed the patient's new clinical results.  Results from last 7 days   Lab Units 01/06/25  0731 01/05/25  0225 01/04/25  1905   WBC 10*3/mm3 9.67 14.31* 13.49*   HEMOGLOBIN g/dL 13.7 14.1 14.6   PLATELETS 10*3/mm3 293 267 305     Results from last 7 days   Lab Units 01/06/25  0949 01/05/25  0225 01/04/25  1905   SODIUM mmol/L 138 135* 134*   POTASSIUM mmol/L 3.6 4.3 4.4   CHLORIDE mmol/L 108* 109* 108*   CO2 mmol/L 21.0* 18.0* 15.7*   BUN mg/dL 26* 37* 42*   CREATININE mg/dL 1.32* 1.34* 1.55*   GLUCOSE mg/dL 172* 121* 124*   Estimated Creatinine Clearance: 89.4 mL/min (A) (by C-G formula based on SCr of 1.32 mg/dL (H)).  Results from last 7 days   Lab Units 01/06/25  0949 01/04/25  1905   ALBUMIN g/dL 3.6 3.9   BILIRUBIN mg/dL 1.3* 0.7   ALK PHOS U/L 78 79   AST (SGOT) U/L 54* 62*   ALT (SGPT) U/L 114* 107*     Results from last 7 days   Lab Units 01/06/25  0949  "01/05/25  0225 01/04/25  1905   CALCIUM mg/dL 9.3 9.3 9.8   ALBUMIN g/dL 3.6  --  3.9   MAGNESIUM mg/dL 2.3  --   --    PHOSPHORUS mg/dL 2.5  --   --      Results from last 7 days   Lab Units 01/05/25  0426 01/05/25  0225   PROCALCITONIN ng/mL  --  0.14   LACTATE mmol/L 1.1  --      COVID19   Date Value Ref Range Status   01/02/2025 Not Detected Not Detected - Ref. Range Final   11/05/2021 Not Detected Not Detected - Ref. Range Final     No results found for: \"HGBA1C\", \"POCGLU\"        CT Chest Without Contrast Diagnostic  Narrative: CT OF THE CHEST WITHOUT CONTRAST     HISTORY: Right mainstem occlusion.     COMPARISON: 8/26/2019     TECHNIQUE: Axial CT imaging was obtained through the thorax. No IV  contrast was administered.     FINDINGS:  Images are degraded by significant respiratory artifact. No acute  osseous abnormalities are seen. Tracheostomy is noted. It appears  appropriately positioned. The patient appears to have some mild  bibasilar atelectasis. The study does appear to confirm some material  within the right mainstem bronchus. This could reflect some secretions,  although mass would be a consideration. Thyroid gland is mildly  prominent in size. The esophagus is within normal limits. Mediastinal  lymph nodes do not appear pathologically enlarged. There is enlargement  of the main pulmonary artery, which can be seen in setting of pulmonary  arterial hypertension. There is some dilatation of the ascending aorta,  measuring up to 4.1 cm. Proximal descending thoracic aorta measures 2.8  cm. The distal aorta measures 2.5 cm. No coronary artery calcifications  are seen. Images through the upper abdomen demonstrate no acute  abnormalities.     Impression:    1. Images are significantly compromised by motion artifact. However, the  patient does appear to have some material within the right mainstem  bronchus. While this could reflect debris mass lesion is not excluded.     Radiation dose reduction techniques " were utilized, including automated  exposure control and exposure modulation based on body size.        This report was finalized on 1/5/2025 1:06 AM by Dr. Petrona De Jesus M.D on Workstation: [id number]       Scheduled Medications  albuterol, 2.5 mg, Nebulization, Q6H - RT  allopurinol, 300 mg, Oral, Daily  vitamin C, 1,000 mg, Oral, Daily  Cariprazine HCl, 1.5 mg, Oral, Daily  colchicine, 0.6 mg, Oral, BID  guaiFENesin, 1,200 mg, Oral, BID  lithium, 1,200 mg, Oral, Daily  nebivolol, 5 mg, Oral, Daily  oseltamivir, 75 mg, Oral, BID  sodium chloride, 10 mL, Intravenous, Q12H  sodium chloride, 4 mL, Nebulization, 4x Daily  triamcinolone, 1 application , Topical, BID    Infusions   Diet  Diet: Regular/House; Fluid Consistency: Thin (IDDSI 0)    I have personally reviewed     [x]  Laboratory   [x]  Microbiology   [x]  Radiology   [x]  EKG/Telemetry  []  Cardiology/Vascular   []  Pathology    []  Records      Assessment/Plan     Active Hospital Problems    Diagnosis  POA    **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     Right mainstem obstruction with blood clot  Influenza A infection  -CT chest with material noted in the right mainstem bronchus most likely mass lesion, no obvious consolidation or infiltrate  -Status post 2 bronchoscopies, with extraction of right mainstem mucous plug/blood clot, feeling better afterwards.  -Completed Tamiflu for influenza  -Bronchodilators, hypertonic saline  -Modified oxygen recommended to prevent recurrent plugging          Elevated transaminases  -AST and ALT elevated on admission labs  -May be secondary to viral infection as above  -Hepatitis panel negative  -Check liver ultrasound  -Daily CMP     JACIEL  Metabolic acidosis  -His baseline  creatinine appears to be around 1  -Creatinine and bicarb are improving  -Continue IV fluids  -     Hypertension  -Blood pressures have been stable. Continue continue nebivolol  -Hold Losartan secondary to JACIEL  -Monitor     Bipolar I disorder  -Continue lithium and Vraylar  -Lithium was elevated at 1.3.  Just above the upper limit of normal.  No symptoms.  Going to continue current dosing for now.  -Will recheck level in a.m.        SCDs for DVT prophylaxis.  Full code.  Discussed with patient.  Likely stable to be discharged in 1-2 days  Expected Discharge Date: 1/9/2025; Expected Discharge Time:        Copied text in this note has been reviewed and is accurate as of 01/06/25.         Dictated utilizing Dragon dictation        Naomi Lundy MD  [facility]  01/06/25  13:42 EST        Electronically signed by Naomi Lundy MD at 01/06/25 1349       Donnell Coleman MD at 01/06/25 1154            Consult Daily Progress Note  [facility]   01/06/25      Patient Name:  Wang Tee  MRN:  [id number]   YOB: 1975  Age: 49 y.o.  Sex: male  LOS: 2    Reason for Consult:  Dyspnea, mucous plugging    Hospital Course:   49-year-old male with a history of bipolar disorder, obesity, vocal cord dystonia with chronic tracheostomy who presented to the emergency department with difficulty breathing.  Patient was recently diagnosed with influenza A and with thick sputum/blood production from his trachea.    Interval History:  Patient and bronchoscopy performed yesterday at bedside with right mainstem obstruction noted with dried blood.  Sequently had repeat bronchoscopy performed in the endoscopy lab with extraction of clot with underlying friable mucosa.  No acute events overnight  Respiratory PCR positive for influenza A  6 L tracheostomy  Doing well on evaluation, states that he feels significantly better  Using hypertonic saline and continue to cough up some  mucous plugs  Father is at bedside    Physical Exam:  Vitals:    01/06/25 1115   BP:    Pulse: 69   Resp: 22   Temp:    SpO2: 100%       Intake/Output    Intake/Output Summary (Last 24 hours) at 1/6/2025 1154  Last data filed at 1/6/2025 0900  Gross per 24 hour   Intake 560 ml   Output --   Net 560 ml       General: Alert, nontoxic, NAD  HEENT: NC/AT, EOMI, MMM  Neck: Tracheostomy  Cardiac: RRR, no murmur, gallops, rubs  Pulmonary: Clear to auscultation bilaterally, no adventitious breath sounds, normal respiratory effort  GI: Soft, non-tender, non-distended, normal bowel sounds  Extremities: Warm, well perfused, no LE edema  Skin: no visible rash  Neuro: CN II - XII grossly intact  Psychiatry: Normal mood and affect      Data Review:  Results from last 7 days   Lab Units 01/06/25  0731 01/05/25 0225 01/04/25  1905   WBC 10*3/mm3 9.67 14.31* 13.49*   HEMOGLOBIN g/dL 13.7 14.1 14.6   PLATELETS 10*3/mm3 293 267 305     Results from last 7 days   Lab Units 01/06/25  0949 01/05/25 0225 01/04/25  1905   SODIUM mmol/L 138 135* 134*   POTASSIUM mmol/L 3.6 4.3 4.4   CHLORIDE mmol/L 108* 109* 108*   CO2 mmol/L 21.0* 18.0* 15.7*   BUN mg/dL 26* 37* 42*   CREATININE mg/dL 1.32* 1.34* 1.55*   GLUCOSE mg/dL 172* 121* 124*   CALCIUM mg/dL 9.3 9.3 9.8   MAGNESIUM mg/dL 2.3  --   --    PHOSPHORUS mg/dL 2.5  --   --    Estimated Creatinine Clearance: 89.4 mL/min (A) (by C-G formula based on SCr of 1.32 mg/dL (H)).    Results from last 7 days   Lab Units 01/06/25  0949 01/06/25  0731 01/05/25  0426 01/05/25 0225 01/04/25  1905   AST (SGOT) U/L 54*  --   --   --  62*   ALT (SGPT) U/L 114*  --   --   --  107*   PROCALCITONIN ng/mL  --   --   --  0.14  --    LACTATE mmol/L  --   --  1.1  --   --    PLATELETS 10*3/mm3  --  293  --  267 305               Imaging:  Reviewed chest images personally from past 3 days    ASSESSMENT  /  PLAN:    Right mainstem obstruction with blood clot  Influenza A infection  JACIEL, improving  S/p emergent  tracheostomy in 2019 for airway obstruction (suspected to be related to risperidone).  Currently on Geovani trach size 6  Leukocytosis  HTN  Bipolar 1 disorder    -Patient underwent 2 bronchoscopies with extraction of right mainstem mucous plug/blood clot with improvement in his respiratory symptoms.  -Completed course of Tamiflu for influenza  -Bronchodilator therapy with albuterol and hypertonic saline.  Helping with airway clearance  -Recommend humidified air as an outpatient to prevent recurrent mucous plugging. He has future plan to potentially remove the trach this month    All issues new to me today.  Prior hospital course, labs and imaging reviewed.    Thank you for allowing us to participate in this patients care. Pulmonary will continue to follow.     Donnell Coleman MD  [facility]  Pulmonary and Critical Care Medicine, Interventional Pulmonology    Parts of this note may be an electronic transcription/translation of spoken language to printed text using the Dragon dictation system.         Electronically signed by Donnell Coleman MD at 25 1256       Rosa Garvey MD at 25 1100              Name: Wang Tee ADMIT: 2025   : 1975  PCP: Julia Knapp APRN    MRN: [id number] LOS: 1 days   AGE/SEX: 49 y.o. male  ROOM: [facility]     Subjective   Subjective   No acute events overnight.  Trach collar in place.  Patient still endorsing pretty significant amount of secretions and difficulty breathing.  No chest pain.  No vomiting or diarrhea.    Objective   Objective     Vital Signs  Temp:  [97.7 °F (36.5 °C)-98.2 °F (36.8 °C)] 98.1 °F (36.7 °C)  Heart Rate:  [55-71] 55  Resp:  [20-26] 20  BP: (103-149)/() 149/107  SpO2:  [91 %-100 %] 96 %  on  Flow (L/min) (Oxygen Therapy):  [4-10] 10;   Device (Oxygen Therapy): tracheostomy collar  Body mass index is 40.8 kg/m².    Physical Exam  General: Alert, no acute distress.  Chronically ill-appearing.  Answers questions  "appropriately.  ENT: Tracheostomy in place, minimal secretions noted.  Neuro: Eyes open and moving in all directions, strength normal in all extremities, no focal deficits.   Lungs: Diminished aeration, transmitted upper airway congestion.  No distress.  Trach collar in place.  Heart: RRR, no murmurs. No edema.  Abdomen: Soft, non-tender, non-distended. Normal bowel sounds.   Ext: Warm and well-perfused. No edema.   Skin: No rashes or lesions. IV site without swelling or erythema.     Results Review     I reviewed the patient's new clinical results:  Results from last 7 days   Lab Units 01/05/25 0225 01/04/25  1905   WBC 10*3/mm3 14.31* 13.49*   HEMOGLOBIN g/dL 14.1 14.6   PLATELETS 10*3/mm3 267 305     Results from last 7 days   Lab Units 01/05/25 0225 01/04/25  1905   SODIUM mmol/L 135* 134*   POTASSIUM mmol/L 4.3 4.4   CHLORIDE mmol/L 109* 108*   CO2 mmol/L 18.0* 15.7*   BUN mg/dL 37* 42*   CREATININE mg/dL 1.34* 1.55*   GLUCOSE mg/dL 121* 124*   EGFR mL/min/1.73 65.3 54.9*     Results from last 7 days   Lab Units 01/04/25  1905   ALBUMIN g/dL 3.9   BILIRUBIN mg/dL 0.7   ALK PHOS U/L 79   AST (SGOT) U/L 62*   ALT (SGPT) U/L 107*     Results from last 7 days   Lab Units 01/05/25 0225 01/04/25  1905   CALCIUM mg/dL 9.3 9.8   ALBUMIN g/dL  --  3.9     Results from last 7 days   Lab Units 01/05/25  0426   LACTATE mmol/L 1.1     No results found for: \"HGBA1C\", \"POCGLU\"    CT Chest Without Contrast Diagnostic    Result Date: 1/5/2025   1. Images are significantly compromised by motion artifact. However, the patient does appear to have some material within the right mainstem bronchus. While this could reflect debris mass lesion is not excluded.  Radiation dose reduction techniques were utilized, including automated exposure control and exposure modulation based on body size.   This report was finalized on 1/5/2025 1:06 AM by Dr. Petrona De Jesus M.D on Workstation: [id number]       I have personally reviewed all " medications:  Scheduled Medications  allopurinol, 300 mg, Oral, Daily  Cariprazine HCl, 1.5 mg, Oral, Daily  colchicine, 0.6 mg, Oral, BID  guaiFENesin, 1,200 mg, Oral, BID  lithium, 1,200 mg, Oral, Daily  nebivolol, 5 mg, Oral, Daily  oseltamivir, 75 mg, Oral, BID  sodium chloride, 10 mL, Intravenous, Q12H  triamcinolone, 1 application , Topical, BID    Infusions  sodium chloride, 75 mL/hr, Last Rate: 75 mL/hr (01/05/25 0313)    Diet  NPO Diet NPO Type: Sips with Meds      Intake/Output Summary (Last 24 hours) at 1/5/2025 1104  Last data filed at 1/5/2025 0017  Gross per 24 hour   Intake 1000 ml   Output --   Net 1000 ml       Assessment/Plan     Active Hospital Problems    Diagnosis  POA    **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       49 y.o. male with Tracheal mass.    Tracheal mass  -Pulmonology to take for bronchoscopy today  -NPO after midnight  -He is currently receiving humidified oxygen by trach collar at 10 L.  Continue oxygen to keep sats greater than or equal to 92%  -CT chest with material noted in the right mainstem bronchus most likely mass lesion, no obvious consolidation or infiltrate  -WBC further elevated to 14.31 today.  CT chest with no infiltrate and patient afebrile.  Going to hold off on empiric antibiotics for now.  Check procalcitonin.  Will defer to pulmonology.  -Pulmonology following     Influenza A  -Symptomatic treatment  -Continue Tamiflu to complete 5 day course    Elevated transaminases  -AST and ALT elevated on admission labs  -May be secondary to viral infection as above  -Check acute hepatitis panel  -Repeat CMP with morning labs     JACIEL  Metabolic acidosis  -His baseline creatinine appears to be around 1  -Creatinine improved to 1.34  today  -Bicarbonate improved to 18, anion gap remains normal  -Continue IV fluids  -Avoid nephrotoxins  -Repeat BMP with morning labs     Hypertension  -Blood pressures have been stable. Continue continue nebivolol  -Hold Losartan secondary to JACIEL  -Monitor     Bipolar I disorder  -Continue lithium and Vraylar  -Lithium elevated at 1.3.  Just above the upper limit of normal.  No symptoms.  Going to continue current dosing for now.    SCDs for DVT prophylaxis.  Full code.  Discussed with patient and nursing staff.  Anticipate discharge home timing yet to be determined.      Rosa Garvey MD  [facility]  01/05/25  11:04 EST      Electronically signed by Rosa Garvey MD at 01/05/25 1115       Germán Warren MD at 01/05/25 0832                                                        LOS: 1 day   Patient Care Team:  Julia Knapp APRN as PCP - General (Family Medicine)    Chief Complaint:  F/up dyspnea.  Mucous plugging    Subjective   Interval History  I reviewed the admission note, progress notes, PMH, PSH, Family hx, social history, imagings and prior records on this admission, summarized the finding in my note and formulated a transition of care plan.      On oxygen 28% by trach collar.  He continues to have difficulty breathing.  He is tachypneic.    REVIEW OF SYSTEMS:   CARDIOVASCULAR: No chest pain, chest pressure or chest discomfort. No palpitations or edema.     GASTROINTESTINAL: No anorexia, nausea, vomiting or diarrhea. No abdominal pain.  CONSTITUTIONAL: No fever or chills.     Ventilator/Non-Invasive Ventilation Settings (From admission, onward)      None                  Physical Exam:     Vital Signs  Temp:  [97.7 °F (36.5 °C)-98.2 °F (36.8 °C)] 98.1 °F (36.7 °C)  Heart Rate:  [55-71] 55  Resp:  [20-26] 20  BP: (103-149)/() 149/107    Intake/Output Summary (Last 24 hours) at 1/5/2025 0824  Last data filed at 1/5/2025 0017  Gross per 24 hour   Intake 1000 ml   Output --    Net 1000 ml     Flowsheet Rows      Flowsheet Row First Filed Value   Admission Height --   Admission Weight 125 kg (276 lb 6.4 oz) Documented at 01/05/2025 0215            PPE used per hospital policy    General Appearance:   Alert, cooperative, in no acute distress   ENMT:  Mallampati score 3, moist mucous membrane   Eyes:  Pupils equal and reactive to light. EOMI   Neck:   Large. Trachea midline. No thyromegaly.   Lungs:   Coarse with expiratory wheezes.    Heart:   Regular rhythm and normal rate, normal S1 and S2, no         murmur   Skin:   No rash or ecchymosis   Abdomen:    Obese. Soft. No tenderness. No HSM.   Neuro/psych:  Conscious, alert, oriented x3. Strength 5/5 in upper and lower  ext.  Appropriate mood and affect   Extremities:  No cyanosis, clubbing or edema.  Warm extremities and well-perfused          Results Review:        Results from last 7 days   Lab Units 01/05/25 0225 01/04/25  1905   SODIUM mmol/L 135* 134*   POTASSIUM mmol/L 4.3 4.4   CHLORIDE mmol/L 109* 108*   CO2 mmol/L 18.0* 15.7*   BUN mg/dL 37* 42*   CREATININE mg/dL 1.34* 1.55*   GLUCOSE mg/dL 121* 124*   CALCIUM mg/dL 9.3 9.8         Results from last 7 days   Lab Units 01/05/25 0225 01/04/25  1905   WBC 10*3/mm3 14.31* 13.49*   HEMOGLOBIN g/dL 14.1 14.6   HEMATOCRIT % 44.5 42.0   PLATELETS 10*3/mm3 267 305                                   I reviewed the patient's new clinical results.        Medication Review:   allopurinol, 300 mg, Oral, Daily  Cariprazine HCl, 1.5 mg, Oral, Daily  colchicine, 0.6 mg, Oral, BID  guaiFENesin, 1,200 mg, Oral, BID  lithium, 1,200 mg, Oral, Daily  nebivolol, 5 mg, Oral, Daily  oseltamivir, 75 mg, Oral, BID  sodium chloride, 10 mL, Intravenous, Q12H  triamcinolone, 1 application , Topical, BID        sodium chloride, 75 mL/hr, Last Rate: 75 mL/hr (01/05/25 0313)        Diagnostic imaging:  I personally and independently reviewed the following images:  CT chest 1/5/2025: Endobronchial  defect  involving the right mainstem bronchus.    Assessment     Right bronchial obstruction  Influenza A infection  JACIEL, improving  S/p emergent tracheostomy in 2019 for airway obstruction (suspected to be related to risperidone).  Currently on Geovani trach size 6  Leukocytosis  HTN  Bipolar 1 disorder    All problems new to me    Plan       Tamiflu for influenza infection  Initiate scheduled DuoNeb & HS neb 4 times a day for pulmonary toileting  Recommend outpatient use of humidified air at least at night to prevent mucous plugging.  He has future plan to potentially remove the trach this month  Plan for bronchoscopy today to remove the mucous plug.  We may have to swap his trach to a larger size to allow enough room for intervention.    Germán Warren MD  01/05/25  08:24 EST          This note was dictated utilizing Dragon dictation     Electronically signed by Germán Warren MD at 01/05/25 0903          Consult Notes (all)        Anabelle Cha APRN at 01/04/25 2200        Consult Orders    1. Inpatient Pulmonology Consult [[id number]] ordered by Isabelle Wiggins APRN at 01/04/25 2256              Attestation signed by Tarik Araiza DO at 01/05/25 2584    I have reviewed this documentation and agree.  I, Tarik Araiza DO, personally performed the services described in this documentation as scribed by JUANITA , and it is both accurate and complete.  I have independently examined the patient and agree with the assessment and plan with additions and clarifications.    49-year-old male with a past medical history of hypertension, bipolar disorder, obesity (BMI 40.80), anxiety, and vocal cord dystonia with chronic tracheostomy. He presented to the emergency department on 1/4/2025 with complaints of difficulty breathing.  Patient was recently diagnosed with influenza A and he is felt like he has had some blockages within his trachea.  He has noticed increased thick, blood clot sputum production.  He was able  to pull out a blood clot using the brush.  States that he does not using her cannula at all and that he just occasionally uses the brush.  They do change out his trach regularly and clean it appropriately.  He believes there is something on the end of his trach that is intermittently obstructing it.  He sees ENT in [facility] and they are planning to do some kind of procedure with his laryngeal nerve to release a vocal cord on one of the sides to hopefully help with his vocal cord dysfunction with the ultimate goal of not needing the trach forever.  Workup in ED showed JACIEL.  Please see separate bronchoscopy note for findings of right mainstem occlusion.       A/P  Right Mainstem Occlusion  Influenza A  Acute kidney injury  Hypertension  Bipolar 1 disorder    Given the patient's complaints I did want to ensure that the trach was in the right position therefore did bedside bronchoscopy in the ER.  The Geovani does end up seated just inside the trachea with some granulation tissue surrounding the end of it but overall with a clearly patent lumen and nothing obstructing the end of it.  On evaluation down the trachea there was a right mainstem occlusion and this appears to be due to dried secretions that have essentially accumulated and are almost obstructing the entire right mainstem.  The left side was unremarkable.  He apparently does sleep near a humidifier that is very close to his trach opening.  Will place on trach mask with humidified oxygen.  He will need formal therapeutic bronc to pull out these dried secretions with forceps or cryo.      I performed > 50% of the management (including interview, exam, assessment, and plan) required in the care of this patient. I have reviewed this documentation and agree.                    Group: [facility]         CONSULT NOTE    Patient Identification:  Wang Tee  49 y.o.  male  1975  [id number]            Requesting physician: Isabelle  JUANITA Wiggins    Reason for Consultation: Tracheal obstruction    CC: Difficulty breathing    History of Present Illness:  Wang Tee is a 49-year-old male with a past medical history of hypertension, bipolar disorder, obesity (BMI 40.80), anxiety, and vocal cord dystonia with chronic tracheostomy.    He presented to the emergency department on 1/4/2025 with complaints of difficulty breathing.  Patient was recently diagnosed with influenza A and he is felt like he has had some blockages within his trachea.  He has noticed increased thick, blood clot sputum production.  He has been suctioning through his indwelling tracheostomy with some success.  He has been cleaning out his existing tracheostomy with a brush to clear off clots.  He came into the ER because he felt like he was having some mucous plugging with difficulty getting secretions up.  He denies recent fever denies chest pain.  ED evaluation included: Creatinine 1.55, BUN 42, WBC 13.49.  Chest x-ray showed low lung volumes.    Review of Systems:  CONSTITUTIONAL:  Denies fevers or chills  EYE:  No new vision changes  EAR:  No change in hearing  CARDIAC:  No chest pain  PULMONARY: Positive productive cough, thick/scabby secretions from tracheostomy tube, shortness of breath  GI:  No diarrhea, hematemesis or hematochezia  RENAL:  No dysuria or urinary frequency  MUSCULOSKELETAL:  No musculoskeletal complaints  ENDOCRINE:  No heat or cold intolerance  INTEGUMENTARY: No skin rashes  NEUROLOGICAL:  No dizziness or confusion.  No seizure activity  PSYCHIATRIC:  No new anxiety or depression  12 system review of systems performed and all else negative     Past Medical History:  Past Medical History:   Diagnosis Date    Abnormal serum thyroxine (T4) level     Bipolar affective     Depression 1992    Disorder of vocal cord     vocal cord dystonia    Elevated liver enzymes     Gout     Gout     History of medical problems 1992    Bipolar    Hypertension        Past  Surgical History:  Past Surgical History:   Procedure Laterality Date    TRACHEOSTOMY          Home Meds:  Medications Prior to Admission   Medication Sig Dispense Refill Last Dose/Taking    albuterol (PROVENTIL) (2.5 MG/3ML) 0.083% nebulizer solution Take 2.5 mg by nebulization Every 4 (Four) Hours As Needed for Wheezing. 90 each 0 Patient Taking Differently    allopurinol (ZYLOPRIM) 300 MG tablet Take 1 tablet by mouth Daily. 90 tablet 3 Past Week    ascorbic acid (VITAMIN C) 500 MG tablet Take 1 tablet by mouth Daily. (Patient taking differently: Take 1 tablet by mouth Daily As Needed (causes gout pain).)   1/4/2025    cholecalciferol (VITAMIN D3) 10 MCG (400 UNIT) tablet Take  by mouth Daily.   Past Week    clonazePAM (KlonoPIN) 0.5 MG tablet Take 1 tablet by mouth At Night As Needed.   Patient Taking Differently    co-enzyme Q-10 30 MG capsule Take  by mouth Daily.   Patient Taking Differently    colchicine 0.6 MG tablet Take 1 tablet by mouth 2 (Two) Times a Day. 60 tablet 2 Patient Taking Differently    cyclobenzaprine (FLEXERIL) 10 MG tablet Take 1 tablet by mouth 3 (Three) Times a Day As Needed. for muscle spams   Patient Taking Differently    doxycycline (MONODOX) 100 MG capsule Take 1 capsule by mouth 2 (Two) Times a Day. 14 capsule 0 Patient Taking Differently    guaiFENesin (MUCINEX) 600 MG 12 hr tablet Take 2 tablets by mouth 2 (Two) Times a Day. 30 tablet 0 Patient Taking Differently    hydrOXYzine (ATARAX) 25 MG tablet Take 1 tablet by mouth Daily As Needed for Anxiety.   Past Week    lithium 600 MG capsule Take 1 capsule by mouth 2 (Two) Times a Day. 600 PM (Patient taking differently: Take 2 capsules by mouth Daily. 600 PM)  0 Patient Taking Differently    loperamide (IMODIUM) 2 MG capsule Take 1 capsule by mouth 4 (Four) Times a Day As Needed for Diarrhea. 20 capsule 0 Patient Taking Differently    losartan (COZAAR) 100 MG tablet Take 1 tablet by mouth Daily. 90 tablet 2 Past Week    Melatonin 10  MG tablet Take 1 tablet by mouth At Night As Needed.   Patient Taking Differently    methylPREDNISolone (MEDROL) 4 MG dose pack Take as directed on package instructions. 21 tablet 0 Past Month    multivitamin with minerals tablet tablet Take 1 tablet by mouth Daily.   Past Week    mupirocin (BACTROBAN) 2 % ointment Apply 1 Application topically to the appropriate area as directed Daily. PRN   Patient Taking Differently    nebivolol (BYSTOLIC) 5 MG tablet Take 1 tablet by mouth once daily 30 tablet 0 1/4/2025    predniSONE (DELTASONE) 20 MG tablet 3 po daily x 1d, then 2 po daily x 2d, then 1 po daily x 2d. 9 tablet 0 Past Month    triamcinolone (KENALOG) 0.1 % cream Apply 1 Application topically to the appropriate area as directed 2 (Two) Times a Day.   Patient Taking Differently    Vraylar 1.5 MG capsule capsule Take 1 capsule by mouth Daily.   1/4/2025    oseltamivir (TAMIFLU) 75 MG capsule Take 1 capsule by mouth 2 (Two) Times a Day for 5 days. 10 capsule 0 Unknown       Allergies:  Allergies   Allergen Reactions    Quetiapine Other (See Comments)     Becomes violent when awakening    Haloperidol Other (See Comments)     Locked up jaw    Amlodipine-Valsartan-Hctz Swelling    Aripiprazole Swelling    Olanzapine Unknown - Low Severity    Risperidone Other (See Comments)    Sulfa Antibiotics     Sulfamethoxazole-Trimethoprim     Valproic Acid Irritability    Amlodipine Swelling and Palpitations     SWELLING OF ANKLES    Bupropion Other (See Comments)       Social History:   Social History     Socioeconomic History    Marital status: Single   Tobacco Use    Smoking status: Never    Smokeless tobacco: Never   Vaping Use    Vaping status: Never Used   Substance and Sexual Activity    Alcohol use: Not Currently     Comment: social    Drug use: No    Sexual activity: Not Currently     Partners: Male       Family History:  Family History   Problem Relation Age of Onset    Hypertension Mother     Arthritis Mother      Diabetes Mother     Kidney disease Mother     Hypertension Father     Anxiety disorder Other     Bipolar disorder Other        Physical Exam:  /67 (BP Location: Right arm, Patient Position: Lying)   Pulse 55   Temp 98.2 °F (36.8 °C) (Oral)   Resp 20   Wt 125 kg (276 lb 6.4 oz)   SpO2 96%   BMI 40.80 kg/m²  Body mass index is 40.8 kg/m². 96% 125 kg (276 lb 6.4 oz)  Constitutional: Middle aged obese male pt sitting up in bed, No acute respiratory distress, no accessory muscle use, pleasant and interactive  Head: - NCAT  Eyes: No pallor, Anicteric conjunctiva, EOMI.  ENMT:  Mallampati 3, no oral thrush. Dry MM.  Existing Geovani tracheostomy in place, no inner cannula  NECK: Trachea midline, No thyromegaly, no palpable cervical LNpathy no JVD  Heart: RRR, no murmur. No pedal edema   Lungs: OLIVER +, No wheezes/ crackles heard    Abdomen: Soft. No tenderness, guarding or rigidity. No palpable masses  Extremities: Extremities warm and well perfused. No cyanosis/ clubbing  Neuro: Conscious, answers appropriately, no gross focal neuro deficits  Psych: Mood and affect appropriate    PPE recommended per [facility] infectious disease Isolation protocol for the current clinical scenario(as mentioned below) was followed.      LABS:  COVID19   Date Value Ref Range Status   01/02/2025 Not Detected Not Detected - Ref. Range Final       Lab Results   Component Value Date    CALCIUM 9.3 01/05/2025     Results from last 7 days   Lab Units 01/05/25  0225 01/04/25  1905   SODIUM mmol/L 135* 134*   POTASSIUM mmol/L 4.3 4.4   CHLORIDE mmol/L 109* 108*   CO2 mmol/L 18.0* 15.7*   BUN mg/dL 37* 42*   CREATININE mg/dL 1.34* 1.55*   GLUCOSE mg/dL 121* 124*   CALCIUM mg/dL 9.3 9.8   WBC 10*3/mm3 14.31* 13.49*   HEMOGLOBIN g/dL 14.1 14.6   PLATELETS 10*3/mm3 267 305   ALT (SGPT) U/L  --  107*   AST (SGOT) U/L  --  62*     Lab Results   Component Value Date    CKTOTAL 354 (H) 04/06/2023    TROPONINT <6 11/03/2024              Results from last 7 days   Lab Units 01/05/25  0426   LACTATE mmol/L 1.1         Results from last 7 days   Lab Units 01/02/25  2117   INFLUENZA B PCR  Not Detected   RSV, PCR  Not Detected             Lab Results   Component Value Date    TSH 0.262 (L) 01/30/2024     Estimated Creatinine Clearance: 88.1 mL/min (A) (by C-G formula based on SCr of 1.34 mg/dL (H)).         Imaging: I personally visualized the images of scans/x-rays performed within last 3 days.      Assessment:  Tracheal obstruction  Influenza A  Acute kidney injury  Hypertension  Bipolar 1 disorder    Recommendations:  Called to the emergency department to evaluate patient.  Dr. Araiza did a bedside bronchoscope to evaluate tracheal obstruction and check position of existing tracheostomy tube-see separate note.  Obstruction noted within the right mainstem bronchus from what looks like dried secretions/blood-unable to suction out due to thickness/dry nature of obstruction.  Plan for bronchoscopy in endoscopy on 1/5/2025.  NPO at midnight.  Check CT chest without contrast to better evaluate for possible mucous plugging.  Hold anticoagulation.    Humidified trach collar ordered overnight-concern that patient's dried secretions are secondary to lack of humidity.    Patient was placed in face mask upon entering room and kept mask on throughout our encounter. I wore full protective equipment throughout this patient encounter including a face mask, gown and gloves. Hand hygiene was performed before donning protective equipment and after removal when leaving the room.    Anabelle Cha, APRN  1/5/2025  06:54 EST      Much of this encounter note is an electronic transcription/translation of spoken language to printed text using Dragon Software.     Electronically signed by Tarik Araiza DO at 01/05/25 4313

## 2025-01-07 NOTE — PLAN OF CARE
Goal Outcome Evaluation:  Plan of Care Reviewed With: patient, parent        Progress: improving     Patient alert and oriented x4, trach in use, vital sign stable. Dad at bedside, they were carried along with patient plan of care. No new complain made this moment. Appropriate PPE was utilized with patient care. I will continue to monitor and render care during the shift.

## 2025-01-07 NOTE — PROGRESS NOTES
Nutrition Services    Patient Name:  Wang Tee  YOB: 1975  MRN: [id number]  Admit Date:  1/4/2025  Assessment Date:  01/07/25    NUTRITION SCREENING      Reason for Encounter MST score 2+   Diagnosis/Problem Tracheal mass       PO Diet Diet: Regular/House; Fluid Consistency: Thin (IDDSI 0)   Supplements N/a   PO Intake % %       Medications MAR reviewed by RD   Labs  Listed below, reviewed   Physical Findings Obese  Trach collar   GI Function +BM 1/7   Skin Status B=20, intact       Height  Weight  BMI  Weight Trend        Weight: 125 kg (276 lb 6.4 oz) (01/05/25 0215)  Body mass index is 40.8 kg/m².  Gain       Nutrition Problem (PES) No nutrition diagnosis at this time.       Intervention/Plan RD to follow up per protocol.     Results from last 7 days   Lab Units 01/07/25  0727 01/06/25  0949 01/05/25  0225 01/04/25  1905   SODIUM mmol/L 138 138 135* 134*   POTASSIUM mmol/L 3.8 3.6 4.3 4.4   CHLORIDE mmol/L 110* 108* 109* 108*   CO2 mmol/L 21.0* 21.0* 18.0* 15.7*   BUN mg/dL 21* 26* 37* 42*   CREATININE mg/dL 1.19 1.32* 1.34* 1.55*   CALCIUM mg/dL 9.3 9.3 9.3 9.8   BILIRUBIN mg/dL 1.5* 1.3*  --  0.7   ALK PHOS U/L 77 78  --  79   ALT (SGPT) U/L 112* 114*  --  107*   AST (SGOT) U/L 58* 54*  --  62*   GLUCOSE mg/dL 94 172* 121* 124*     Results from last 7 days   Lab Units 01/07/25  0727 01/06/25  0949   MAGNESIUM mg/dL 2.2 2.3   PHOSPHORUS mg/dL 2.4* 2.5   HEMOGLOBIN g/dL 13.3  --    HEMATOCRIT % 38.8  --      Lab Results   Component Value Date    HGBA1C 5.00 11/16/2022         Electronically signed by:  Sharon Charles RD  01/07/25 17:01 EST

## 2025-01-08 ENCOUNTER — READMISSION MANAGEMENT (OUTPATIENT)
Dept: CALL CENTER | Facility: HOSPITAL | Age: 50
End: 2025-01-08
Payer: COMMERCIAL

## 2025-01-08 VITALS
OXYGEN SATURATION: 100 % | RESPIRATION RATE: 18 BRPM | DIASTOLIC BLOOD PRESSURE: 94 MMHG | HEART RATE: 67 BPM | TEMPERATURE: 98.2 F | SYSTOLIC BLOOD PRESSURE: 153 MMHG | WEIGHT: 276.4 LBS | BODY MASS INDEX: 40.8 KG/M2

## 2025-01-08 LAB
ALBUMIN SERPL-MCNC: 3.6 G/DL (ref 3.5–5.2)
ALBUMIN/GLOB SERPL: 1.1 G/DL
ALP SERPL-CCNC: 75 U/L (ref 39–117)
ALT SERPL W P-5'-P-CCNC: 113 U/L (ref 1–41)
ANION GAP SERPL CALCULATED.3IONS-SCNC: 7.3 MMOL/L (ref 5–15)
AST SERPL-CCNC: 55 U/L (ref 1–40)
BILIRUB SERPL-MCNC: 1.4 MG/DL (ref 0–1.2)
BUN SERPL-MCNC: 15 MG/DL (ref 6–20)
BUN/CREAT SERPL: 13.2 (ref 7–25)
CALCIUM SPEC-SCNC: 9.3 MG/DL (ref 8.6–10.5)
CHLORIDE SERPL-SCNC: 106 MMOL/L (ref 98–107)
CO2 SERPL-SCNC: 20.7 MMOL/L (ref 22–29)
CREAT SERPL-MCNC: 1.14 MG/DL (ref 0.76–1.27)
DEPRECATED RDW RBC AUTO: 42 FL (ref 37–54)
EGFRCR SERPLBLD CKD-EPI 2021: 79.3 ML/MIN/1.73
ERYTHROCYTE [DISTWIDTH] IN BLOOD BY AUTOMATED COUNT: 13 % (ref 12.3–15.4)
GLOBULIN UR ELPH-MCNC: 3.4 GM/DL
GLUCOSE SERPL-MCNC: 102 MG/DL (ref 65–99)
HCT VFR BLD AUTO: 38.1 % (ref 37.5–51)
HGB BLD-MCNC: 13.1 G/DL (ref 13–17.7)
MAGNESIUM SERPL-MCNC: 2.1 MG/DL (ref 1.6–2.6)
MCH RBC QN AUTO: 30.5 PG (ref 26.6–33)
MCHC RBC AUTO-ENTMCNC: 34.4 G/DL (ref 31.5–35.7)
MCV RBC AUTO: 88.8 FL (ref 79–97)
PHOSPHATE SERPL-MCNC: 2.3 MG/DL (ref 2.5–4.5)
PLATELET # BLD AUTO: 306 10*3/MM3 (ref 140–450)
PMV BLD AUTO: 8.6 FL (ref 6–12)
POTASSIUM SERPL-SCNC: 3.8 MMOL/L (ref 3.5–5.2)
PROT SERPL-MCNC: 7 G/DL (ref 6–8.5)
RBC # BLD AUTO: 4.29 10*6/MM3 (ref 4.14–5.8)
SODIUM SERPL-SCNC: 134 MMOL/L (ref 136–145)
WBC NRBC COR # BLD AUTO: 9.38 10*3/MM3 (ref 3.4–10.8)

## 2025-01-08 PROCEDURE — 80053 COMPREHEN METABOLIC PANEL: CPT | Performed by: STUDENT IN AN ORGANIZED HEALTH CARE EDUCATION/TRAINING PROGRAM

## 2025-01-08 PROCEDURE — 83735 ASSAY OF MAGNESIUM: CPT | Performed by: STUDENT IN AN ORGANIZED HEALTH CARE EDUCATION/TRAINING PROGRAM

## 2025-01-08 PROCEDURE — 94618 PULMONARY STRESS TESTING: CPT

## 2025-01-08 PROCEDURE — 94799 UNLISTED PULMONARY SVC/PX: CPT

## 2025-01-08 PROCEDURE — 94664 DEMO&/EVAL PT USE INHALER: CPT

## 2025-01-08 PROCEDURE — 84100 ASSAY OF PHOSPHORUS: CPT | Performed by: STUDENT IN AN ORGANIZED HEALTH CARE EDUCATION/TRAINING PROGRAM

## 2025-01-08 PROCEDURE — 85027 COMPLETE CBC AUTOMATED: CPT | Performed by: STUDENT IN AN ORGANIZED HEALTH CARE EDUCATION/TRAINING PROGRAM

## 2025-01-08 RX ADMIN — ALBUTEROL SULFATE 2.5 MG: 2.5 SOLUTION RESPIRATORY (INHALATION) at 12:16

## 2025-01-08 RX ADMIN — Medication 4 ML: at 12:17

## 2025-01-08 RX ADMIN — TRIAMCINOLONE ACETONIDE 1 APPLICATION: 1 CREAM TOPICAL at 09:26

## 2025-01-08 RX ADMIN — FAMOTIDINE 20 MG: 20 TABLET, FILM COATED ORAL at 09:24

## 2025-01-08 RX ADMIN — ALBUTEROL SULFATE 2.5 MG: 2.5 SOLUTION RESPIRATORY (INHALATION) at 07:51

## 2025-01-08 RX ADMIN — ALLOPURINOL 300 MG: 100 TABLET ORAL at 09:24

## 2025-01-08 RX ADMIN — LITHIUM CARBONATE 600 MG: 300 CAPSULE, GELATIN COATED ORAL at 09:26

## 2025-01-08 RX ADMIN — COLCHICINE 0.6 MG: 0.6 TABLET ORAL at 09:25

## 2025-01-08 RX ADMIN — NEBIVOLOL 5 MG: 5 TABLET ORAL at 09:25

## 2025-01-08 RX ADMIN — CARIPRAZINE 1.5 MG: 1.5 CAPSULE, GELATIN COATED ORAL at 09:25

## 2025-01-08 RX ADMIN — Medication 10 ML: at 09:26

## 2025-01-08 RX ADMIN — DIBASIC SODIUM PHOSPHATE, MONOBASIC POTASSIUM PHOSPHATE AND MONOBASIC SODIUM PHOSPHATE 2 TABLET: 852; 155; 130 TABLET ORAL at 09:25

## 2025-01-08 RX ADMIN — GUAIFENESIN 1200 MG: 600 TABLET, EXTENDED RELEASE ORAL at 09:25

## 2025-01-08 RX ADMIN — OXYCODONE HYDROCHLORIDE AND ACETAMINOPHEN 1000 MG: 500 TABLET ORAL at 09:25

## 2025-01-08 RX ADMIN — Medication 4 ML: at 07:52

## 2025-01-08 NOTE — PROGRESS NOTES
[facility] Clinical Pharmacy Services: Enoxaparin Consult    Wang Tee has a pharmacy consult to dose prophylactic enoxaparin per Dr. Lundy's request.     Indication: VTE Prophylaxis  Home Anticoagulation: N/A     Relevant clinical data and objective history reviewed:  49 y.o. male   125 kg (276 lb 6.4 oz)   Body mass index is 40.8 kg/m².   Results from last 7 days   Lab Units 01/07/25  0727   PLATELETS 10*3/mm3 294     Estimated Creatinine Clearance: 99.2 mL/min (by C-G formula based on SCr of 1.19 mg/dL).    Assessment/Plan    Will start patient on 40mg subcutaneous every 12 hours, adjusted for renal function and BMI >40. Consult order will be discontinued but pharmacy will continue to follow.     Dariel Jackson III, [facility]  Clinical Pharmacist

## 2025-01-08 NOTE — PROGRESS NOTES
Exercise Oximetry    Patient Name:Wang Tee   MRN: [id number]   Date: 01/08/25             ROOM AIR BASELINE   SpO2% 100   Heart Rate 70   Blood Pressure      EXERCISE ON ROOM AIR SpO2% EXERCISE ON O2 @  LPM SpO2%   1 MINUTE 100 1 MINUTE    2 MINUTES 100 2 MINUTES    3 MINUTES 100 3 MINUTES    4 MINUTES 100 4 MINUTES    5 MINUTES 100 5 MINUTES    6 MINUTES 100 6 MINUTES               Distance Walked   Distance Walked   Dyspnea (Janine Scale)   Dyspnea (Janine Scale)   Fatigue (Janine Scale)   Fatigue (Janine Scale)   SpO2% Post Exercise   SpO2% Post Exercise   HR Post Exercise   HR Post Exercise    Time to Recovery     Comments: o2 sats 100% with forehead probe at rest on room air, and  100% with ambulation, on    room air  Karol Monsivais RRT

## 2025-01-08 NOTE — PROGRESS NOTES
Consult Daily Progress Note  [facility]   01/08/25      Patient Name:  Wang Tee  MRN:  [id number]   YOB: 1975  Age: 49 y.o.  Sex: male  LOS: 4    Reason for Consult:  Dyspnea, mucous plugging    Hospital Course:   49-year-old male with a history of bipolar disorder, obesity, vocal cord dystonia with chronic tracheostomy who presented to the emergency department with difficulty breathing.  Patient was recently diagnosed with influenza A and with thick sputum/blood production from his trachea.    Interval History:  No acute events overnight  Weaned to room air  Mucus is significantly proved  No nausea vomiting  No chest pain or palpitations  Wants to go home today  Father is not at bedside    Physical Exam:  Vitals:    01/08/25 0759   BP:    Pulse: 65   Resp: 18   Temp:    SpO2: 97%       Intake/Output    Intake/Output Summary (Last 24 hours) at 1/8/2025 1027  Last data filed at 1/8/2025 0900  Gross per 24 hour   Intake 720 ml   Output 300 ml   Net 420 ml       General: Alert, nontoxic, NAD  HEENT: NC/AT, EOMI, MMM  Neck: Tracheostomy  Cardiac: RRR, no murmur, gallops, rubs  Pulmonary: Clear to auscultation bilaterally, no adventitious breath sounds, normal respiratory effort  GI: Soft, non-tender, non-distended, normal bowel sounds  Extremities: Warm, well perfused, no LE edema  Skin: no visible rash  Neuro: CN II - XII grossly intact  Psychiatry: Normal mood and affect      Data Review:  Results from last 7 days   Lab Units 01/08/25  0621 01/07/25  0727 01/06/25  0731 01/05/25 0225 01/04/25  1905   WBC 10*3/mm3 9.38 9.39 9.67 14.31* 13.49*   HEMOGLOBIN g/dL 13.1 13.3 13.7 14.1 14.6   PLATELETS 10*3/mm3 306 294 293 267 305     Results from last 7 days   Lab Units 01/08/25  0621 01/07/25  0727 01/06/25  0949 01/05/25  0225 01/04/25  1905   SODIUM mmol/L 134* 138 138 135* 134*   POTASSIUM mmol/L 3.8 3.8 3.6 4.3 4.4   CHLORIDE mmol/L 106 110* 108* 109* 108*   CO2 mmol/L 20.7* 21.0*  21.0* 18.0* 15.7*   BUN mg/dL 15 21* 26* 37* 42*   CREATININE mg/dL 1.14 1.19 1.32* 1.34* 1.55*   GLUCOSE mg/dL 102* 94 172* 121* 124*   CALCIUM mg/dL 9.3 9.3 9.3 9.3 9.8   MAGNESIUM mg/dL 2.1 2.2 2.3  --   --    PHOSPHORUS mg/dL 2.3* 2.4* 2.5  --   --    Estimated Creatinine Clearance: 103.6 mL/min (by C-G formula based on SCr of 1.14 mg/dL).    Results from last 7 days   Lab Units 01/08/25  0621 01/07/25  0727 01/06/25  0949 01/06/25  0731 01/05/25  0426 01/05/25  0225   AST (SGOT) U/L 55* 58* 54*  --   --   --    ALT (SGPT) U/L 113* 112* 114*  --   --   --    PROCALCITONIN ng/mL  --   --   --   --   --  0.14   LACTATE mmol/L  --   --   --   --  1.1  --    PLATELETS 10*3/mm3 306 294  --  293  --  267               Imaging:  Reviewed chest images personally from past 3 days    ASSESSMENT  /  PLAN:    Right mainstem obstruction with blood clot  Influenza A infection  JACIEL, improving  S/p emergent tracheostomy in 2019 for airway obstruction (suspected to be related to risperidone).  Currently on Geovani trach size 6  Leukocytosis  HTN  Bipolar 1 disorder    -Patient underwent 2 bronchoscopies with extraction of right mainstem mucous plug/blood clot with improvement in his respiratory symptoms.  -Completed course of Tamiflu for influenza  -Bronchodilator therapy with albuterol and hypertonic saline.  Helping with airway clearance.  This is significant improved.  -Recommend humidified air as an outpatient to prevent recurrent mucous plugging. He has future plan to potentially remove the trach this month  -Walking oximetry does not demonstrate any need for supplemental oxygen at rest or with exertion.  -Stable for discharge from pulmonary standpoint.    Thank you for allowing us to participate in this patients care.  Pulmonary will sign off at this time.  Please contact us if further questions or concerns arise.    Donnell Coleman MD  [facility]  Pulmonary and Critical Care Medicine, Interventional  Pulmonology    Parts of this note may be an electronic transcription/translation of spoken language to printed text using the Dragon dictation system.

## 2025-01-08 NOTE — OUTREACH NOTE
Prep Survey      Flowsheet Row Responses   [facility] patient discharged from? [place]   Is LACE score < 7 ? No   Eligibility [facility]   Date of Admission 01/04/25   Date of Discharge 01/08/25   [place] [facility] [place]charge diagnosis Tracheal mass, Influenza A   Does the patient have one of the following disease processes/diagnoses(primary or secondary)? Other   Does the patient have Home health ordered? No   Is there a DME ordered? No   General alerts for this patient chronic tracheostomy   Prep survey completed? Yes            Azra ESTEVEZ - Registered Nurse

## 2025-01-08 NOTE — DISCHARGE SUMMARY
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        Hospital Course   Mr. Tee is a 49 y.o. 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  Patient was diagnosed with Influenza A three days ago prior to admission and he has had a persistent cough.       Right mainstem obstruction with blood clot  Influenza A infection  History of emergent tracheostomy in 2019 for airway obstruction (suspected to be related to risperidone).   -CT chest with material noted in the right mainstem bronchus most likely mass lesion, no obvious consolidation or infiltrate  -Status post 2 bronchoscopies, with extraction of right mainstem mucous plug/blood clot.  Patient's symptoms improved significantly afterwards.  -Completed Tamiflu for influenza  -Treated with bronchodilators, hypertonic saline  -Modified oxygen recommended to prevent recurrent plugging  -Underwent walking oximetry prior to discharge, did not need oxygen.        Elevated transaminases  BRISCOE  -AST and ALT elevated on admission labs  -May be secondary to viral infection as above  -Hepatitis panel negative  -Liver ultrasound- Diffusely increased hepatic echogenicity, likely reflecting hepatic  steatosis, with suspected focal fatty sparing  near the gallbladder fossa.2. Possible sludge or small stones within the gallbladder. No specificultrasound evidence of acute cholecystitis.  -Discussed findings.  Encouraged diet, and weight loss.  -Noted to follow-up with PCP to monitor LFTs     JACIEL  Metabolic acidosis  -Creatinine was 1.55 on admission  -His baseline creatinine appears to be around 1-1.1  -Creatinine improved to baseline with IV fluids.     Hypertension  -Blood pressures have been stable. Continue continue nebivolol  -Losartan was held secondary to JACIEL, resumed at discharge.  Will need repeat BMP in 5 to 7 days to monitor renal function.  Discussed with patient over the phone after discharge.               Day of Discharge     Subjective:  Laying in bed, father present at bedside.  No acute events overnight.  Weaned down to room air.  Denies fever.  No fevers no chills.  Father and patient want to be discharged home.    Physical Exam:  Temp:  [98.2 °F (36.8 °C)-98.4 °F (36.9 °C)] 98.2 °F (36.8 °C)  Heart Rate:  [65-74] 67  Resp:  [17-20] 18  BP: (127-189)/() 153/94  Body mass index is 40.8 kg/m².  Physical Exam    General: Alert and oriented x3, no acute distress  HEENT: Normocephalic, atraumatic  CV: Regular rate and rhythm, no murmurs rubs or gallops  Lungs: Clear to auscultation bilaterally, no crackles or wheezes  Abdomen: Soft, nontender, nondistended  Extremities: No significant peripheral edema , no cyanosis       Consultants     Consult Orders (all) (From admission, onward)       Start     Ordered    01/04/25 2254  Inpatient Pulmonology Consult  Once        Specialty:  Pulmonary Disease  Provider:  Tarik Araiza,     01/04/25 2256 01/04/25 2140  [facility] (on-call MD unless specified) Details  Once,   Status:  Canceled        Specialty:  Hospitalist  Provider:  (Not yet assigned)    01/04/25 2139 01/04/25 2016  Pulmonology (on-call MD unless specified)  Once,   Status:  Canceled        Specialty:  Pulmonary Disease   Provider:  (Not yet assigned)    01/04/25 2015                  Procedures     BRONCHOSCOPY with cryo      Imaging Results (All)       Procedure Component Value Units Date/Time    US Liver [[id number]] Collected: 01/06/25 2021     Updated: 01/06/25 2027    Narrative:      US LIVER-     Date of Exam: 1/6/2025 6:04 PM     Indication: Transaminitis.     Comparison: CT chest 1/5/2025. Ultrasound 9/2/2015.     Technique: Multiplanar grayscale color flow imaging of the right upper  quadrant of the abdomen was performed.     FINDINGS:  Diffusely increased hepatic echogenicity, suggesting hepatic steatosis.  Ill-defined region of decreased hepatic attenuation near the gallbladder  fossa suggests focal fatty sparing. The liver measures 15.8 cm in  maximal measured length.  No intrahepatic biliary duct dilatation. The  portal vein demonstrates normal hepatopetal flow.     Subtle hyperdensity within the dependent lumen of the otherwise  unremarkable appearing gallbladder could represent sludge or small  stones. No gallbladder wall thickening or pericholecystic fluid. The  common duct measures 0.5 cm in diameter. The ultrasound technologist  reports a negative Gonsalez's sign.     Visualized portions of the pancreatic head and body are within normal  limits.     The right kidney is normal in ultrasound appearance. No solid right  renal mass, shadowing stone, or hydronephrosis. The right kidney  measures 12.4 cm in length.     Visualized portions of the abdominal aorta and IVC are unremarkable.     No free fluid is seen in the right upper quadrant.       Impression:         1. Diffusely increased hepatic echogenicity, likely reflecting hepatic  steatosis, with suspected focal fatty sparing near the gallbladder  fossa.  2. Possible sludge or small stones within the gallbladder. No specific  ultrasound evidence of acute cholecystitis.     This report was finalized on 1/6/2025 8:24 PM by Brendon De Los Santos MD on  Workstation:  [id number]       CT Chest Without Contrast Diagnostic [[id number]] Collected: 01/05/25 0101     Updated: 01/05/25 0109    Narrative:      CT OF THE CHEST WITHOUT CONTRAST     HISTORY: Right mainstem occlusion.     COMPARISON: 8/26/2019     TECHNIQUE: Axial CT imaging was obtained through the thorax. No IV  contrast was administered.     FINDINGS:  Images are degraded by significant respiratory artifact. No acute  osseous abnormalities are seen. Tracheostomy is noted. It appears  appropriately positioned. The patient appears to have some mild  bibasilar atelectasis. The study does appear to confirm some material  within the right mainstem bronchus. This could reflect some secretions,  although mass would be a consideration. Thyroid gland is mildly  prominent in size. The esophagus is within normal limits. Mediastinal  lymph nodes do not appear pathologically enlarged. There is enlargement  of the main pulmonary artery, which can be seen in setting of pulmonary  arterial hypertension. There is some dilatation of the ascending aorta,  measuring up to 4.1 cm. Proximal descending thoracic aorta measures 2.8  cm. The distal aorta measures 2.5 cm. No coronary artery calcifications  are seen. Images through the upper abdomen demonstrate no acute  abnormalities.       Impression:         1. Images are significantly compromised by motion artifact. However, the  patient does appear to have some material within the right mainstem  bronchus. While this could reflect debris mass lesion is not excluded.     Radiation dose reduction techniques were utilized, including automated  exposure control and exposure modulation based on body size.        This report was finalized on 1/5/2025 1:06 AM by Dr. Petrona De Jesus M.D on Workstation: [id number]       XR Chest 1 View [[id number]] Collected: 01/04/25 1902     Updated: 01/04/25 1954    Narrative:      XR CHEST 1 VW-     CLINICAL HISTORY:  Cough, influenza A     FINDINGS:     The  lung volumes are low. A tracheostomy tube is again noted. The  cardiomediastinal silhouette is prominent size and stable when compared  to the prior study dated 1/2/2020. This is likely accentuated by the low  lung volumes. The perihilar markings are prominent and I cannot entirely  exclude the possibility of a perihilar infiltrate. Otherwise, there is  no convincing evidence for an acute infiltrate.           This report was finalized on 1/4/2025 7:51 PM by Dr. Gordon Meng M.D  on Workstation: [id number]               Results for orders placed during the hospital encounter of 11/05/21    Adult Transthoracic Echo Complete W/ Cont if Necessary Per Protocol    Interpretation Summary  · Left ventricular ejection fraction appears to be 61 - 65%. Left ventricular systolic function is normal.  · Left ventricular wall thickness is consistent with mild to moderate concentric hypertrophy  · Left ventricular diastolic function was indeterminate.  · The right ventricular cavity is mildly dilated. Normal right ventricular systolic function noted.  · Interatrial septal aneurysm present.  · Saline test results are negative  · The ascending aorta is mildly dilated at 3.8 cm  · There is no evidence of pericardial effusion    Pertinent Labs     Results from last 7 days   Lab Units 01/08/25  0621 01/07/25  0727 01/06/25  0731 01/05/25  0225   WBC 10*3/mm3 9.38 9.39 9.67 14.31*   HEMOGLOBIN g/dL 13.1 13.3 13.7 14.1   PLATELETS 10*3/mm3 306 294 293 267     Results from last 7 days   Lab Units 01/08/25  0621 01/07/25  0727 01/06/25  0949 01/05/25  0225   SODIUM mmol/L 134* 138 138 135*   POTASSIUM mmol/L 3.8 3.8 3.6 4.3   CHLORIDE mmol/L 106 110* 108* 109*   CO2 mmol/L 20.7* 21.0* 21.0* 18.0*   BUN mg/dL 15 21* 26* 37*   CREATININE mg/dL 1.14 1.19 1.32* 1.34*   GLUCOSE mg/dL 102* 94 172* 121*   Estimated Creatinine Clearance: 103.6 mL/min (by C-G formula based on SCr of 1.14 mg/dL).  Results from last 7 days   Lab Units  "01/08/25  0621 01/07/25  0727 01/06/25  0949 01/04/25  1905   ALBUMIN g/dL 3.6 3.5 3.6 3.9   BILIRUBIN mg/dL 1.4* 1.5* 1.3* 0.7   ALK PHOS U/L 75 77 78 79   AST (SGOT) U/L 55* 58* 54* 62*   ALT (SGPT) U/L 113* 112* 114* 107*     Results from last 7 days   Lab Units 01/08/25  0621 01/07/25  0727 01/06/25  0949 01/05/25  0225 01/04/25  1905   CALCIUM mg/dL 9.3 9.3 9.3 9.3 9.8   ALBUMIN g/dL 3.6 3.5 3.6  --  3.9   MAGNESIUM mg/dL 2.1 2.2 2.3  --   --    PHOSPHORUS mg/dL 2.3* 2.4* 2.5  --   --                Invalid input(s): \"LDLCALC\"      Results from last 7 days   Lab Units 01/02/25  2117   COVID19  Not Detected       Test Results Pending at Discharge       Discharge Details        Discharge Medications        Continue These Medications        Instructions Start Date   albuterol (2.5 MG/3ML) 0.083% nebulizer solution  Commonly known as: PROVENTIL   2.5 mg, Nebulization, Every 4 Hours PRN      allopurinol 300 MG tablet  Commonly known as: ZYLOPRIM   300 mg, Oral, Daily      ascorbic acid 500 MG tablet  Commonly known as: VITAMIN C   500 mg, Daily      cholecalciferol 10 MCG (400 UNIT) tablet  Commonly known as: VITAMIN D3   Daily      clonazePAM 0.5 MG tablet  Commonly known as: KlonoPIN   0.5 mg, Nightly PRN      co-enzyme Q-10 30 MG capsule   Daily      colchicine 0.6 MG tablet   0.6 mg, Oral, 2 Times Daily      cyclobenzaprine 10 MG tablet  Commonly known as: FLEXERIL   10 mg, 3 Times Daily PRN      guaiFENesin 600 MG 12 hr tablet  Commonly known as: MUCINEX   1,200 mg, Oral, 2 Times Daily      hydrOXYzine 25 MG tablet  Commonly known as: ATARAX   25 mg, Daily PRN      lithium 600 MG capsule   600 mg, 2 Times Daily      loperamide 2 MG capsule  Commonly known as: IMODIUM   2 mg, Oral, 4 Times Daily PRN      losartan 100 MG tablet  Commonly known as: COZAAR   100 mg, Oral, Daily      Melatonin 10 MG tablet   10 mg, Nightly PRN      multivitamin with minerals tablet tablet   1 tablet, Daily      mupirocin 2 % " ointment  Commonly known as: BACTROBAN   1 Application, Daily      nebivolol 5 MG tablet  Commonly known as: BYSTOLIC   5 mg, Oral, Daily      triamcinolone 0.1 % cream  Commonly known as: KENALOG   2 Times Daily      Vraylar 1.5 MG capsule capsule  Generic drug: Cariprazine HCl   1 capsule, Daily             Stop These Medications      doxycycline 100 MG capsule  Commonly known as: MONODOX     methylPREDNISolone 4 MG dose pack  Commonly known as: MEDROL     oseltamivir 75 MG capsule  Commonly known as: TAMIFLU     predniSONE 20 MG tablet  Commonly known as: DELTASONE              Allergies   Allergen Reactions    Quetiapine Other (See Comments)     Becomes violent when awakening    Haloperidol Other (See Comments)     Locked up jaw    Amlodipine-Valsartan-Hctz Swelling    Aripiprazole Swelling    Olanzapine Unknown - Low Severity    Risperidone Other (See Comments)    Sulfa Antibiotics     Sulfamethoxazole-Trimethoprim     Valproic Acid Irritability    Amlodipine Swelling and Palpitations     SWELLING OF ANKLES    Bupropion Other (See Comments)       Discharge Disposition:  Home-Health Care Svc      Discharge Diet:  No active diet order      Discharge Activity:       CODE STATUS:    Code Status and Medical Interventions: CPR (Attempt to Resuscitate); Full Support   Ordered at: 01/04/25 6587     Code Status (Patient has no pulse and is not breathing):    CPR (Attempt to Resuscitate)     Medical Interventions (Patient has pulse or is breathing):    Full Support       Future Appointments   Date Time Provider Department Center   1/16/2025 10:00 AM [facility] ECHO/VAS [facility] ECHO [facility]   1/16/2025 11:15 AM Johnny Franco MD [facility] CD LCG40 None   1/30/2025  7:30 AM [facility] IM [facility] IM EAPT2 [facility]   2/6/2025  2:00 PM Julia Knapp APRN [facility] IM EAPT2 [facility]      Follow-up Information       Julia Knapp APRN. Schedule an appointment as soon as possible for a visit in 1 week(s).    Specialty: Family  Medicine  Contact information:  [address]  [place] [id number] [phone]                             Time Spent on Discharge:  Greater than 30 minutes      Naomi Lundy MD  [facility]  01/08/25  17:46 EST

## 2025-01-08 NOTE — PROGRESS NOTES
Case Management Discharge Note      Final Note: DC home with family and no DC needs         Selected Continued Care - Discharged on 1/8/2025 Admission date: 1/4/2025 - Discharge disposition: Home-Health Care Svc      Destination    No services have been selected for the patient.                Durable Medical Equipment    No services have been selected for the patient.                Dialysis/Infusion    No services have been selected for the patient.                Home Medical Care    No services have been selected for the patient.                Therapy    No services have been selected for the patient.                Community Resources    No services have been selected for the patient.                Community & DME    No services have been selected for the patient.                         Final Discharge Disposition Code: 01 - home or self-care

## 2025-01-08 NOTE — NURSING NOTE
AVS completed and discharge instructions provided to patient. PIV x1 removed. Patient to call out when ready for transport.

## 2025-01-09 ENCOUNTER — TRANSITIONAL CARE MANAGEMENT TELEPHONE ENCOUNTER (OUTPATIENT)
Dept: CALL CENTER | Facility: HOSPITAL | Age: 50
End: 2025-01-09
Payer: COMMERCIAL

## 2025-01-09 NOTE — OUTREACH NOTE
Call Center TCM Note      Flowsheet Row Responses   [facility] patient discharged from? [place]   Does the patient have one of the following disease processes/diagnoses(primary or secondary)? Other   TCM attempt successful? Yes   Call start time 1102   Call end time 1107   General alerts for this patient chronic tracheostomy   Discharge diagnosis Tracheal mass, Influenza A   Person spoke with today (if not patient) and relationship Mother   Meds reviewed with patient/caregiver? Yes   Is the patient having any side effects they believe may be caused by any medication additions or changes? No   Does the patient have all medications ordered at discharge? N/A   Is the patient taking all medications as directed (includes completed medication regime)? Yes   Comments Needs GI fu appt, Cardiology/Echo 1/16/25 1/15/25   Does the patient have an appointment with their PCP within 7-14 days of discharge? No appointments available   Nursing Interventions Routed TCM call to PCP office, PCP office requested to make appointment - message sent   Psychosocial issues? No   Did the patient receive a copy of their discharge instructions? Yes   Nursing interventions Reviewed instructions with patient   What is the patient's perception of their health status since discharge? Same   Is the patient/caregiver able to teach back signs and symptoms related to disease process for when to call PCP? Yes   Is the patient/caregiver able to teach back signs and symptoms related to disease process for when to call 911? Yes   Is the patient/caregiver able to teach back the hierarchy of who to call/visit for symptoms/problems? PCP, Specialist, Home health nurse, Urgent Care, ED, 911 Yes   If the patient is a current smoker, are they able to teach back resources for cessation? Not a smoker   TCM call completed? Yes   Wrap up additional comments Mother states pt is doing better, and pt woke up and denies SOB since hospital dc. No medication  issues, and stopped medications as listed on AVS. Pt has cardiology fu appt, pt advised to fu with GI, and PCP.   Call end time 1107   Would this patient benefit from a Referral to [facility] Social Work? No   Is the patient interested in additional calls from an ambulatory ? No            Zulma Gee RN    1/9/2025, 11:09 EST

## 2025-01-09 NOTE — PAYOR COMM NOTE
"Freya Ennis (49 y.o. Male)        PLEASE SEE ATTACHED DC SUMMARY    REF#[id number]      THANK YOU    JONNIE ROYAL LPN CCP   Date of Birth   1975    Social Security Number       [place]   [address] [place] [address]    Home Phone   [phone]    MRN   [id number]       Jewish   None    Marital Status   Single                            Admission Date   25    Admission Type   Emergency    Admitting Provider   Callum Berry MD    Attending Provider       Department, Room/Bed   [address], S621/1       Discharge Date   2025    Discharge Disposition   Home-Health Care [facility]    Discharge Destination                                 Attending Provider: (none)   Allergies: Quetiapine, Haloperidol, Amlodipine-valsartan-hctz, Aripiprazole, Olanzapine, Risperidone, Sulfa Antibiotics, Sulfamethoxazole-trimethoprim, Valproic Acid, Amlodipine, Bupropion    Isolation: None   Infection: Influenza (25)   Code Status: Prior    Ht: 175.3 cm (69.02\")   Wt: 125 kg (276 lb 6.4 oz)    Admission Cmt: None   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]                   Active Insurance as of 2025       Primary Coverage       Payor Plan Insurance Group Employer/Plan Group    [employer] [facility] [facility] [id number]       Payor Plan Address Payor Plan Phone Number Payor Plan Fax Number Effective Dates    [phone]   2024 - None Entered    [place] [address]         Subscriber Name Subscriber Birth Date Member ID       FREYA ENNIS 1975 [id number]                     Emergency Contacts        (Rel.) Home Phone Work Phone Mobile Phone    Latricia Ennis (Mother) [phone] -- --    Ghassan Sargent (Father) -- -- [phone]              [place]: [facility] [id number]  Tax ID [id number]     Discharge Summary        Naomi Lundy MD at 25 1354              Patient Name: Freya Ennis  : 1975  MRN: [id number]    Date of Admission: " 1/4/2025  Date of Discharge:  1/8/2025  Primary Care Physician: Julia Knapp APRN      Chief Complaint:   Shortness of Breath      Discharge Diagnoses     Active Hospital Problems    Diagnosis  POA    **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        Hospital Course   Mr. Tee is a 49 y.o. non-smoker with a history of tracheostomy due to vocal cord dysfunction, Bipolar I disorder, hypertension, and pericarditis that presents to [facility] complaining of shortness of breath.  Patient was diagnosed with Influenza A three days ago prior to admission and he has had a persistent cough.       Right mainstem obstruction with blood clot  Influenza A infection  History of emergent tracheostomy in 2019 for airway obstruction (suspected to be related to risperidone).   -CT chest with material noted in the right mainstem bronchus most likely mass lesion, no obvious consolidation or infiltrate  -Status post 2 bronchoscopies, with extraction of right mainstem mucous plug/blood clot.  Patient's symptoms improved significantly afterwards.  -Completed Tamiflu for influenza  -Treated with bronchodilators, hypertonic saline  -Modified oxygen recommended to prevent recurrent plugging  -Underwent walking oximetry prior to discharge, did not need oxygen.        Elevated transaminases  BRISCOE  -AST and ALT elevated on admission labs  -May be secondary to viral infection as above  -Hepatitis panel negative  -Liver ultrasound- Diffusely increased hepatic echogenicity, likely reflecting hepatic  steatosis, with suspected focal fatty sparing near the gallbladder fossa.2. Possible sludge or small stones within the gallbladder. No  specificultrasound evidence of acute cholecystitis.  -Discussed findings.  Encouraged diet, and weight loss.  -Noted to follow-up with PCP to monitor LFTs     JACIEL  Metabolic acidosis  -Creatinine was 1.55 on admission  -His baseline creatinine appears to be around 1-1.1  -Creatinine improved to baseline with IV fluids.     Hypertension  -Blood pressures have been stable. Continue continue nebivolol  -Losartan was held secondary to JACIEL, resumed at discharge.  Will need repeat BMP in 5 to 7 days to monitor renal function.  Discussed with patient over the phone after discharge.               Day of Discharge     Subjective:  Laying in bed, father present at bedside.  No acute events overnight.  Weaned down to room air.  Denies fever.  No fevers no chills.  Father and patient want to be discharged home.    Physical Exam:  Temp:  [98.2 °F (36.8 °C)-98.4 °F (36.9 °C)] 98.2 °F (36.8 °C)  Heart Rate:  [65-74] 67  Resp:  [17-20] 18  BP: (127-189)/() 153/94  Body mass index is 40.8 kg/m².  Physical Exam    General: Alert and oriented x3, no acute distress  HEENT: Normocephalic, atraumatic  CV: Regular rate and rhythm, no murmurs rubs or gallops  Lungs: Clear to auscultation bilaterally, no crackles or wheezes  Abdomen: Soft, nontender, nondistended  Extremities: No significant peripheral edema , no cyanosis       Consultants     Consult Orders (all) (From admission, onward)       Start     Ordered    01/04/25 2254  Inpatient Pulmonology Consult  Once        Specialty:  Pulmonary Disease  Provider:  Tarik Araiza,     01/04/25 2256 01/04/25 2140  [facility] (on-call MD unless specified) Details  Once,   Status:  Canceled        Specialty:  Hospitalist  Provider:  (Not yet assigned)    01/04/25 2139 01/04/25 2016  Pulmonology (on-call MD unless specified)  Once,   Status:  Canceled        Specialty:  Pulmonary Disease  Provider:  (Not yet assigned)    01/04/25 2015                  Procedures     BRONCHOSCOPY  with cryo      Imaging Results (All)       Procedure Component Value Units Date/Time    US Liver [[id number]] Collected: 01/06/25 2021     Updated: 01/06/25 2027    Narrative:      US LIVER-     Date of Exam: 1/6/2025 6:04 PM     Indication: Transaminitis.     Comparison: CT chest 1/5/2025. Ultrasound 9/2/2015.     Technique: Multiplanar grayscale color flow imaging of the right upper  quadrant of the abdomen was performed.     FINDINGS:  Diffusely increased hepatic echogenicity, suggesting hepatic steatosis.  Ill-defined region of decreased hepatic attenuation near the gallbladder  fossa suggests focal fatty sparing. The liver measures 15.8 cm in  maximal measured length.  No intrahepatic biliary duct dilatation. The  portal vein demonstrates normal hepatopetal flow.     Subtle hyperdensity within the dependent lumen of the otherwise  unremarkable appearing gallbladder could represent sludge or small  stones. No gallbladder wall thickening or pericholecystic fluid. The  common duct measures 0.5 cm in diameter. The ultrasound technologist  reports a negative Gonsalez's sign.     Visualized portions of the pancreatic head and body are within normal  limits.     The right kidney is normal in ultrasound appearance. No solid right  renal mass, shadowing stone, or hydronephrosis. The right kidney  measures 12.4 cm in length.     Visualized portions of the abdominal aorta and IVC are unremarkable.     No free fluid is seen in the right upper quadrant.       Impression:         1. Diffusely increased hepatic echogenicity, likely reflecting hepatic  steatosis, with suspected focal fatty sparing near the gallbladder  fossa.  2. Possible sludge or small stones within the gallbladder. No specific  ultrasound evidence of acute cholecystitis.     This report was finalized on 1/6/2025 8:24 PM by Brendon De Los Santos MD on  Workstation: [id number]       CT Chest Without Contrast Diagnostic [[id number]] Collected: 01/05/25 0101     Updated:  01/05/25 0109    Narrative:      CT OF THE CHEST WITHOUT CONTRAST     HISTORY: Right mainstem occlusion.     COMPARISON: 8/26/2019     TECHNIQUE: Axial CT imaging was obtained through the thorax. No IV  contrast was administered.     FINDINGS:  Images are degraded by significant respiratory artifact. No acute  osseous abnormalities are seen. Tracheostomy is noted. It appears  appropriately positioned. The patient appears to have some mild  bibasilar atelectasis. The study does appear to confirm some material  within the right mainstem bronchus. This could reflect some secretions,  although mass would be a consideration. Thyroid gland is mildly  prominent in size. The esophagus is within normal limits. Mediastinal  lymph nodes do not appear pathologically enlarged. There is enlargement  of the main pulmonary artery, which can be seen in setting of pulmonary  arterial hypertension. There is some dilatation of the ascending aorta,  measuring up to 4.1 cm. Proximal descending thoracic aorta measures 2.8  cm. The distal aorta measures 2.5 cm. No coronary artery calcifications  are seen. Images through the upper abdomen demonstrate no acute  abnormalities.       Impression:         1. Images are significantly compromised by motion artifact. However, the  patient does appear to have some material within the right mainstem  bronchus. While this could reflect debris mass lesion is not excluded.     Radiation dose reduction techniques were utilized, including automated  exposure control and exposure modulation based on body size.        This report was finalized on 1/5/2025 1:06 AM by Dr. Petrona De Jesus M.D on Workstation: [id number]       XR Chest 1 View [[id number]] Collected: 01/04/25 1902     Updated: 01/04/25 1954    Narrative:      XR CHEST 1 VW-     CLINICAL HISTORY:  Cough, influenza A     FINDINGS:     The lung volumes are low. A tracheostomy tube is again noted. The  cardiomediastinal silhouette is prominent  size and stable when compared  to the prior study dated 1/2/2020. This is likely accentuated by the low  lung volumes. The perihilar markings are prominent and I cannot entirely  exclude the possibility of a perihilar infiltrate. Otherwise, there is  no convincing evidence for an acute infiltrate.           This report was finalized on 1/4/2025 7:51 PM by Dr. Gordon Meng M.D  on Workstation: [id number]               Results for orders placed during the hospital encounter of 11/05/21    Adult Transthoracic Echo Complete W/ Cont if Necessary Per Protocol    Interpretation Summary  · Left ventricular ejection fraction appears to be 61 - 65%. Left ventricular systolic function is normal.  · Left ventricular wall thickness is consistent with mild to moderate concentric hypertrophy  · Left ventricular diastolic function was indeterminate.  · The right ventricular cavity is mildly dilated. Normal right ventricular systolic function noted.  · Interatrial septal aneurysm present.  · Saline test results are negative  · The ascending aorta is mildly dilated at 3.8 cm  · There is no evidence of pericardial effusion    Pertinent Labs     Results from last 7 days   Lab Units 01/08/25 0621 01/07/25 0727 01/06/25  0731 01/05/25  0225   WBC 10*3/mm3 9.38 9.39 9.67 14.31*   HEMOGLOBIN g/dL 13.1 13.3 13.7 14.1   PLATELETS 10*3/mm3 306 294 293 267     Results from last 7 days   Lab Units 01/08/25 0621 01/07/25  0727 01/06/25  0949 01/05/25  0225   SODIUM mmol/L 134* 138 138 135*   POTASSIUM mmol/L 3.8 3.8 3.6 4.3   CHLORIDE mmol/L 106 110* 108* 109*   CO2 mmol/L 20.7* 21.0* 21.0* 18.0*   BUN mg/dL 15 21* 26* 37*   CREATININE mg/dL 1.14 1.19 1.32* 1.34*   GLUCOSE mg/dL 102* 94 172* 121*   Estimated Creatinine Clearance: 103.6 mL/min (by C-G formula based on SCr of 1.14 mg/dL).  Results from last 7 days   Lab Units 01/08/25 0621 01/07/25 0727 01/06/25  0949 01/04/25  1905   ALBUMIN g/dL 3.6 3.5 3.6 3.9   BILIRUBIN mg/dL 1.4*  "1.5* 1.3* 0.7   ALK PHOS U/L 75 77 78 79   AST (SGOT) U/L 55* 58* 54* 62*   ALT (SGPT) U/L 113* 112* 114* 107*     Results from last 7 days   Lab Units 01/08/25  0621 01/07/25  0727 01/06/25  0949 01/05/25  0225 01/04/25  1905   CALCIUM mg/dL 9.3 9.3 9.3 9.3 9.8   ALBUMIN g/dL 3.6 3.5 3.6  --  3.9   MAGNESIUM mg/dL 2.1 2.2 2.3  --   --    PHOSPHORUS mg/dL 2.3* 2.4* 2.5  --   --                Invalid input(s): \"LDLCALC\"      Results from last 7 days   Lab Units 01/02/25 2117   COVID19  Not Detected       Test Results Pending at Discharge       Discharge Details        Discharge Medications        Continue These Medications        Instructions Start Date   albuterol (2.5 MG/3ML) 0.083% nebulizer solution  Commonly known as: PROVENTIL   2.5 mg, Nebulization, Every 4 Hours PRN      allopurinol 300 MG tablet  Commonly known as: ZYLOPRIM   300 mg, Oral, Daily      ascorbic acid 500 MG tablet  Commonly known as: VITAMIN C   500 mg, Daily      cholecalciferol 10 MCG (400 UNIT) tablet  Commonly known as: VITAMIN D3   Daily      clonazePAM 0.5 MG tablet  Commonly known as: KlonoPIN   0.5 mg, Nightly PRN      co-enzyme Q-10 30 MG capsule   Daily      colchicine 0.6 MG tablet   0.6 mg, Oral, 2 Times Daily      cyclobenzaprine 10 MG tablet  Commonly known as: FLEXERIL   10 mg, 3 Times Daily PRN      guaiFENesin 600 MG 12 hr tablet  Commonly known as: MUCINEX   1,200 mg, Oral, 2 Times Daily      hydrOXYzine 25 MG tablet  Commonly known as: ATARAX   25 mg, Daily PRN      lithium 600 MG capsule   600 mg, 2 Times Daily      loperamide 2 MG capsule  Commonly known as: IMODIUM   2 mg, Oral, 4 Times Daily PRN      losartan 100 MG tablet  Commonly known as: COZAAR   100 mg, Oral, Daily      Melatonin 10 MG tablet   10 mg, Nightly PRN      multivitamin with minerals tablet tablet   1 tablet, Daily      mupirocin 2 % ointment  Commonly known as: BACTROBAN   1 Application, Daily      nebivolol 5 MG tablet  Commonly known as: BYSTOLIC   5 " mg, Oral, Daily      triamcinolone 0.1 % cream  Commonly known as: KENALOG   2 Times Daily      Vraylar 1.5 MG capsule capsule  Generic drug: Cariprazine HCl   1 capsule, Daily             Stop These Medications      doxycycline 100 MG capsule  Commonly known as: MONODOX     methylPREDNISolone 4 MG dose pack  Commonly known as: MEDROL     oseltamivir 75 MG capsule  Commonly known as: TAMIFLU     predniSONE 20 MG tablet  Commonly known as: DELTASONE              Allergies   Allergen Reactions    Quetiapine Other (See Comments)     Becomes violent when awakening    Haloperidol Other (See Comments)     Locked up jaw    Amlodipine-Valsartan-Hctz Swelling    Aripiprazole Swelling    Olanzapine Unknown - Low Severity    Risperidone Other (See Comments)    Sulfa Antibiotics     Sulfamethoxazole-Trimethoprim     Valproic Acid Irritability    Amlodipine Swelling and Palpitations     SWELLING OF ANKLES    Bupropion Other (See Comments)       Discharge Disposition:  Home-Health Care [facility]      Discharge Diet:  No active diet order      Discharge Activity:       CODE STATUS:    Code Status and Medical Interventions: CPR (Attempt to Resuscitate); Full Support   Ordered at: 01/04/25 0521     Code Status (Patient has no pulse and is not breathing):    CPR (Attempt to Resuscitate)     Medical Interventions (Patient has pulse or is breathing):    Full Support       Future Appointments   Date Time Provider Department Center   1/16/2025 10:00 AM [facility] ECHO/VAS FRONT [facility] ECHO [facility]   1/16/2025 11:15 AM Johnny Franco MD [facility] CD LCG40 None   1/30/2025  7:30 AM [facility] [address] [facility] IM EAPT2 [facility]   2/6/2025  2:00 PM Julia Knapp APRN [facility] IM EAPT2 [facility]      Follow-up Information       Julia Knapp APRN. Schedule an appointment as soon as possible for a visit in 1 week(s).    Specialty: Family Medicine  Contact information:  [address]  [place] [id number] [phone]                             Time  Spent on Discharge:  Greater than 30 minutes      Naomi Dan MD  [facility]  01/08/25  17:46 EST                Electronically signed by Naomi Dan MD at 01/08/25 1755       Discharge Order (From admission, onward)       Start     Ordered    01/08/25 0948  Discharge patient  Once        Comments: Discharge home pending walk oximetry.   Expected Discharge Date: 01/08/25   Discharge Disposition: [place] [facility]   Physician of Record for Attribution - Please select from Treatment Team: NAOMI DAN [[id number]]   Review needed by CMO to determine Physician of Record: No      Question Answer Comment   Physician of Record for Attribution - Please select from Treatment Team NAOMI DAN    Review needed by CMO to determine Physician of Record No        01/08/25 0948

## 2025-01-13 DIAGNOSIS — M1A.0790 CHRONIC GOUT OF FOOT, UNSPECIFIED CAUSE, UNSPECIFIED LATERALITY: ICD-10-CM

## 2025-01-13 RX ORDER — ALLOPURINOL 300 MG/1
300 TABLET ORAL DAILY
Qty: 90 TABLET | Refills: 0 | Status: SHIPPED | OUTPATIENT
Start: 2025-01-13

## 2025-01-13 NOTE — PROGRESS NOTES
Patient would like to increase her Wellbutrin to the next dose. Please send to [facility] pharmacy

## 2025-01-14 ENCOUNTER — TELEPHONE (OUTPATIENT)
Dept: CARDIOLOGY | Facility: CLINIC | Age: 50
End: 2025-01-14
Payer: COMMERCIAL

## 2025-01-16 ENCOUNTER — HOSPITAL ENCOUNTER (OUTPATIENT)
Dept: CARDIOLOGY | Facility: HOSPITAL | Age: 50
Discharge: HOME | End: 2025-01-16
Admitting: NURSE PRACTITIONER
Payer: COMMERCIAL

## 2025-01-16 ENCOUNTER — OFFICE VISIT (OUTPATIENT)
Age: 50
End: 2025-01-16
Payer: COMMERCIAL

## 2025-01-16 VITALS
HEIGHT: 69 IN | DIASTOLIC BLOOD PRESSURE: 82 MMHG | BODY MASS INDEX: 40.88 KG/M2 | SYSTOLIC BLOOD PRESSURE: 130 MMHG | WEIGHT: 276 LBS

## 2025-01-16 VITALS
DIASTOLIC BLOOD PRESSURE: 82 MMHG | HEART RATE: 86 BPM | BODY MASS INDEX: 39.58 KG/M2 | HEIGHT: 69 IN | SYSTOLIC BLOOD PRESSURE: 130 MMHG | WEIGHT: 267.2 LBS

## 2025-01-16 DIAGNOSIS — Z86.79 HISTORY OF PERICARDITIS: Primary | ICD-10-CM

## 2025-01-16 DIAGNOSIS — I10 ESSENTIAL HYPERTENSION: ICD-10-CM

## 2025-01-16 DIAGNOSIS — Z86.79 HISTORY OF PERICARDITIS: ICD-10-CM

## 2025-01-16 LAB
AORTIC DIMENSIONLESS INDEX: 0.7 (DI)
ASCENDING AORTA: 3.4 CM
AV MEAN PRESS GRAD SYS DOP V1V2: 3.4 MMHG
AV VMAX SYS DOP: 125.1 CM/SEC
BH CV ECHO MEAS - ACS: 2.02 CM
BH CV ECHO MEAS - AO MAX PG: 6.3 MMHG
BH CV ECHO MEAS - AO ROOT DIAM: 3.8 CM
BH CV ECHO MEAS - AO V2 VTI: 25.3 CM
BH CV ECHO MEAS - AVA(I,D): 2.25 CM2
BH CV ECHO MEAS - EDV(CUBED): 76.6 ML
BH CV ECHO MEAS - EDV(MOD-SP2): 115 ML
BH CV ECHO MEAS - EDV(MOD-SP4): 130 ML
BH CV ECHO MEAS - EF(MOD-SP2): 68.7 %
BH CV ECHO MEAS - EF(MOD-SP4): 65.4 %
BH CV ECHO MEAS - ESV(CUBED): 25.2 ML
BH CV ECHO MEAS - ESV(MOD-SP2): 36 ML
BH CV ECHO MEAS - ESV(MOD-SP4): 45 ML
BH CV ECHO MEAS - FS: 30.9 %
BH CV ECHO MEAS - IVS/LVPW: 1.04 CM
BH CV ECHO MEAS - IVSD: 1.42 CM
BH CV ECHO MEAS - LAT PEAK E' VEL: 13.9 CM/SEC
BH CV ECHO MEAS - LV DIASTOLIC VOL/BSA (35-75): 54.9 CM2
BH CV ECHO MEAS - LV MASS(C)D: 225.7 GRAMS
BH CV ECHO MEAS - LV MAX PG: 3.4 MMHG
BH CV ECHO MEAS - LV MEAN PG: 1.83 MMHG
BH CV ECHO MEAS - LV SYSTOLIC VOL/BSA (12-30): 19 CM2
BH CV ECHO MEAS - LV V1 MAX: 91.9 CM/SEC
BH CV ECHO MEAS - LV V1 VTI: 18.2 CM
BH CV ECHO MEAS - LVIDD: 4.2 CM
BH CV ECHO MEAS - LVIDS: 2.9 CM
BH CV ECHO MEAS - LVOT AREA: 3.1 CM2
BH CV ECHO MEAS - LVOT DIAM: 2 CM
BH CV ECHO MEAS - LVPWD: 1.36 CM
BH CV ECHO MEAS - MED PEAK E' VEL: 7.4 CM/SEC
BH CV ECHO MEAS - MV A DUR: 0.12 SEC
BH CV ECHO MEAS - MV A MAX VEL: 68.4 CM/SEC
BH CV ECHO MEAS - MV DEC SLOPE: 383.9 CM/SEC2
BH CV ECHO MEAS - MV DEC TIME: 0.12 SEC
BH CV ECHO MEAS - MV E MAX VEL: 48.7 CM/SEC
BH CV ECHO MEAS - MV E/A: 0.71
BH CV ECHO MEAS - MV MAX PG: 5.6 MMHG
BH CV ECHO MEAS - MV MEAN PG: 1.79 MMHG
BH CV ECHO MEAS - MV P1/2T: 47.3 MSEC
BH CV ECHO MEAS - MV V2 VTI: 19.2 CM
BH CV ECHO MEAS - MVA(P1/2T): 4.6 CM2
BH CV ECHO MEAS - MVA(VTI): 3 CM2
BH CV ECHO MEAS - PA ACC TIME: 0.13 SEC
BH CV ECHO MEAS - PA V2 MAX: 75.6 CM/SEC
BH CV ECHO MEAS - PI END-D VEL: 116.4 CM/SEC
BH CV ECHO MEAS - PULM A REVS DUR: 0.11 SEC
BH CV ECHO MEAS - PULM A REVS VEL: 51.9 CM/SEC
BH CV ECHO MEAS - PULM DIAS VEL: 44.3 CM/SEC
BH CV ECHO MEAS - PULM S/D: 1.36
BH CV ECHO MEAS - PULM SYS VEL: 60.3 CM/SEC
BH CV ECHO MEAS - QP/QS: 1.01
BH CV ECHO MEAS - RAP SYSTOLE: 3 MMHG
BH CV ECHO MEAS - RV MAX PG: 2.02 MMHG
BH CV ECHO MEAS - RV V1 MAX: 71.1 CM/SEC
BH CV ECHO MEAS - RV V1 VTI: 10.7 CM
BH CV ECHO MEAS - RVOT DIAM: 2.6 CM
BH CV ECHO MEAS - RVSP: 10 MMHG
BH CV ECHO MEAS - SV(LVOT): 57.1 ML
BH CV ECHO MEAS - SV(MOD-SP2): 79 ML
BH CV ECHO MEAS - SV(MOD-SP4): 85 ML
BH CV ECHO MEAS - SV(RVOT): 57.9 ML
BH CV ECHO MEAS - SVI(LVOT): 24.1 ML/M2
BH CV ECHO MEAS - SVI(MOD-SP2): 33.4 ML/M2
BH CV ECHO MEAS - SVI(MOD-SP4): 35.9 ML/M2
BH CV ECHO MEAS - TAPSE (>1.6): 2.44 CM
BH CV ECHO MEAS - TR MAX PG: 6.9 MMHG
BH CV ECHO MEAS - TR MAX VEL: 130.9 CM/SEC
BH CV ECHO MEASUREMENTS AVERAGE E/E' RATIO: 4.57
BH CV XLRA - RV BASE: 2.9 CM
BH CV XLRA - RV LENGTH: 7.8 CM
BH CV XLRA - RV MID: 2.41 CM
BH CV XLRA - TDI S': 12.2 CM/SEC
LEFT ATRIUM VOLUME INDEX: 24.2 ML/M2
LV EF BIPLANE MOD: 67.8 %
SINUS: 3.7 CM
STJ: 3.1 CM

## 2025-01-16 PROCEDURE — 25510000001 PERFLUTREN 6.52 MG/ML SUSPENSION 2 ML VIAL: Performed by: NURSE PRACTITIONER

## 2025-01-16 PROCEDURE — 93306 TTE W/DOPPLER COMPLETE: CPT

## 2025-01-16 RX ORDER — COLCHICINE 0.6 MG/1
0.6 TABLET ORAL DAILY
Qty: 60 TABLET | Refills: 2 | Status: SHIPPED | OUTPATIENT
Start: 2025-01-16

## 2025-01-16 RX ADMIN — PERFLUTREN 2 ML: 6.52 INJECTION, SUSPENSION INTRAVENOUS at 10:44

## 2025-01-16 NOTE — PROGRESS NOTES
[facility] Follow Up Office Note     Encounter Date:25  Patient:Wang Tee  :1975  MRN:[id number]      Chief Complaint:   Chief Complaint   Patient presents with    Hospital Follow Up Visit     6 week f/u     History of Presenting Illness:      Mr. Tee is a 49 y.o. gentleman with past medical history notable for primary hypertriglyceridemia, vocal cord dysfunction status post tracheostomy, and impaired fasting glucose who presents today for scheduled follow-up he was recently in the emergency room back in November with an episode of chest discomfort it was thought that this might have been similar to his pericarditis episode he had about 2 years ago he was empirically started on colchicine and aspirin.  His biomarkers were normal.  He had improvement on this therapy and actually was de-escalated from high-dose aspirin but continued on his colchicine until he saw me.  He had a follow-up echocardiogram today in general he is doing well.    Review of Systems:  Review of Systems   Constitutional: Positive for malaise/fatigue.   HENT: Negative.     Eyes: Negative.    Cardiovascular: Negative.  Negative for chest pain.   Respiratory:  Positive for shortness of breath.    Endocrine: Negative.    Hematologic/Lymphatic: Negative.    Skin: Negative.    Musculoskeletal: Negative.    Gastrointestinal: Negative.    Genitourinary: Negative.    Neurological: Negative.    Psychiatric/Behavioral: Negative.     Allergic/Immunologic: Negative.        Current Outpatient Medications on File Prior to Visit   Medication Sig Dispense Refill    albuterol (PROVENTIL) (2.5 MG/3ML) 0.083% nebulizer solution Take 2.5 mg by nebulization Every 4 (Four) Hours As Needed for Wheezing. 90 each 0    allopurinol (ZYLOPRIM) 300 MG tablet Take 1 tablet by mouth once daily 90 tablet 0    ascorbic acid (VITAMIN C) 500 MG tablet Take 1 tablet by mouth Daily. (Patient taking differently: Take 1 tablet by mouth Daily As Needed  (causes gout pain).)      cholecalciferol (VITAMIN D3) 10 MCG (400 UNIT) tablet Take  by mouth Daily.      clonazePAM (KlonoPIN) 0.5 MG tablet Take 1 tablet by mouth At Night As Needed.      co-enzyme Q-10 30 MG capsule Take  by mouth Daily.      cyclobenzaprine (FLEXERIL) 10 MG tablet Take 1 tablet by mouth 3 (Three) Times a Day As Needed. for muscle spams      guaiFENesin (MUCINEX) 600 MG 12 hr tablet Take 2 tablets by mouth 2 (Two) Times a Day. 30 tablet 0    hydrOXYzine (ATARAX) 25 MG tablet Take 1 tablet by mouth Daily As Needed for Anxiety.      lithium 600 MG capsule Take 1 capsule by mouth 2 (Two) Times a Day. 600 PM (Patient taking differently: Take 2 capsules by mouth Daily. 600 PM)  0    loperamide (IMODIUM) 2 MG capsule Take 1 capsule by mouth 4 (Four) Times a Day As Needed for Diarrhea. 20 capsule 0    losartan (COZAAR) 100 MG tablet Take 1 tablet by mouth Daily. 90 tablet 2    Melatonin 10 MG tablet Take 1 tablet by mouth At Night As Needed.      multivitamin with minerals tablet tablet Take 1 tablet by mouth Daily.      mupirocin (BACTROBAN) 2 % ointment Apply 1 Application topically to the appropriate area as directed Daily. PRN      nebivolol (BYSTOLIC) 5 MG tablet Take 1 tablet by mouth once daily 30 tablet 0    triamcinolone (KENALOG) 0.1 % cream Apply 1 Application topically to the appropriate area as directed 2 (Two) Times a Day.      Vraylar 1.5 MG capsule capsule Take 1 capsule by mouth Daily.      [DISCONTINUED] colchicine 0.6 MG tablet Take 1 tablet by mouth 2 (Two) Times a Day. 60 tablet 2     No current facility-administered medications on file prior to visit.       Allergies   Allergen Reactions    Quetiapine Other (See Comments)     Becomes violent when awakening    Haloperidol Other (See Comments)     Locked up jaw    Amlodipine-Valsartan-Hctz Swelling    Aripiprazole Swelling    Olanzapine Unknown - Low Severity    Risperidone Other (See Comments)    Sulfa Antibiotics      "Sulfamethoxazole-Trimethoprim     Valproic Acid Irritability    Amlodipine Swelling and Palpitations     SWELLING OF ANKLES    Bupropion Other (See Comments)       Past Medical History:   Diagnosis Date    Abnormal serum thyroxine (T4) level     Bipolar affective     Depression 1992    Disorder of vocal cord     vocal cord dystonia    Elevated liver enzymes     Gout     Gout     History of medical problems 1992    Bipolar    Hypertension        Past Surgical History:   Procedure Laterality Date    BRONCHOSCOPY N/A 1/5/2025    Procedure: BRONCHOSCOPY with cryo;  Surgeon: Germán Warren MD;  Location: [facility] ENDOSCOPY;  Service: Pulmonary;  Laterality: N/A;  pre: mucous plugs  post: same    TRACHEOSTOMY         Social History     Socioeconomic History    Marital status: Single   Tobacco Use    Smoking status: Never    Smokeless tobacco: Never   Vaping Use    Vaping status: Never Used   Substance and Sexual Activity    Alcohol use: Not Currently     Comment: social    Drug use: No    Sexual activity: Not Currently     Partners: Male       Family History   Problem Relation Age of Onset    Hypertension Mother     Arthritis Mother     Diabetes Mother     Kidney disease Mother     Hypertension Father     Anxiety disorder Other     Bipolar disorder Other        The following portions of the patient's history were reviewed and updated as appropriate: allergies, current medications, past family history, past medical history, past social history, past surgical history and problem list.       Objective:       Vitals:    01/16/25 1025   BP: 130/82   BP Location: Left arm   Patient Position: Sitting   Pulse: 86   Weight: 121 kg (267 lb 3.2 oz)   Height: 175.3 cm (69.02\")         Physical Exam:  Constitutional: Well appearing, Well-developed, No acute distress   HENT: Oropharynx clear and membrane moist  Eyes: Normal conjunctiva, no sclera icterus.  Neck: Supple, no carotid bruit bilaterally.  Tracheostomy in " place  Cardiovascular: Regular rate and rhythm, No Murmur, No bilateral lower extremity edema.  Pulmonary: Normal respiratory effort, normal lung sounds, no wheezing.  Neurological: Alert and orient x 3.   Skin: Warm, dry, no ecchymosis, no rash.  Psych: Appropriate mood and affect. Normal judgment and insight.          Lab Results   Component Value Date     (L) 01/08/2025     01/07/2025    K 3.8 01/08/2025    K 3.8 01/07/2025     01/08/2025     (H) 01/07/2025    CO2 20.7 (L) 01/08/2025    CO2 21.0 (L) 01/07/2025    BUN 15 01/08/2025    BUN 21 (H) 01/07/2025    CREATININE 1.14 01/08/2025    CREATININE 1.19 01/07/2025    EGFRIFNONA 89 11/06/2021    EGFRIFNONA 89 11/05/2021    EGFRIFAFRI 120 07/25/2017    EGFRIFAFRI 138 08/18/2016    GLUCOSE 102 (H) 01/08/2025    GLUCOSE 94 01/07/2025    CALCIUM 9.3 01/08/2025    CALCIUM 9.3 01/07/2025    PROTENTOTREF 7.0 01/30/2024    PROTENTOTREF 7.0 11/16/2022    ALBUMIN 3.6 01/08/2025    ALBUMIN 3.5 01/07/2025    BILITOT 1.4 (H) 01/08/2025    BILITOT 1.5 (H) 01/07/2025    AST 55 (H) 01/08/2025    AST 58 (H) 01/07/2025     (H) 01/08/2025     (H) 01/07/2025     Lab Results   Component Value Date    WBC 9.38 01/08/2025    WBC 9.39 01/07/2025    HGB 13.1 01/08/2025    HGB 13.3 01/07/2025    HCT 38.1 01/08/2025    HCT 38.8 01/07/2025    MCV 88.8 01/08/2025    MCV 88.8 01/07/2025     01/08/2025     01/07/2025     Lab Results   Component Value Date    CHOL 145 11/06/2021    TRIG 154 (H) 01/30/2024    TRIG 144 11/16/2022    HDL 38 (L) 01/30/2024    HDL 39 (L) 11/16/2022    LDL 88 01/30/2024     (H) 11/16/2022     The 10-year ASCVD risk score (Charisse LEE, et al., 2019) is: 3.1%    Values used to calculate the score:      Age: 49 years      Sex: Male      Is Non- : No      Diabetic: No      Tobacco smoker: No      Systolic Blood Pressure: 130 mmHg      Is BP treated: Yes      HDL Cholesterol: 38 mg/dL       Total Cholesterol: 153 mg/dL       Lab Results   Component Value Date    PROBNP 37.7 11/03/2024    PROBNP 34.5 11/05/2021     Lab Results   Component Value Date    CKTOTAL 354 (H) 04/06/2023    TROPONINT <6 11/03/2024     Lab Results   Component Value Date    TSH 0.262 (L) 01/30/2024    TSH 0.631 11/16/2022     Lab Results   Component Value Date    SEDRATE 2 11/03/2024         ECG 12 Lead    Date/Time: 1/16/2025 11:49 AM  Performed by: Johnny Franco MD    Authorized by: Johnny Franco MD  Comparison: compared with previous ECG from 11/18/2024  Similar to previous ECG  Rhythm: sinus rhythm  Other findings: early repolarization          Echocardiogram 1/16/2025 with images reviewed by myself:  Left ventricular systolic function is normal. Calculated left ventricular EF = 67.8%  Left ventricular wall thickness is consistent with moderate concentric hypertrophy.  Left ventricular diastolic function is consistent with (grade I) impaired relaxation  Estimated right ventricular systolic pressure from tricuspid regurgitation is normal (<35 mmHg). Calculated right ventricular systolic pressure from tricuspid regurgitation is 10 mmHg.  Borderline dilatation of the aortic root; the aortic root measures 3.8 cm.    Echocardiogram 11/5/2021:  Left ventricular ejection fraction appears to be 61 - 65%. Left ventricular systolic function is normal. Left ventricular wall thickness is consistent with mild to moderate concentric hypertrophy. Left ventricular diastolic function was indeterminate.  The right ventricular cavity is mildly dilated. Normal right ventricular systolic function noted.  Interatrial septal aneurysm present. Saline test results are negative  The ascending aorta is mildly dilated at 3.8 cm  There is no evidence of pericardial effusion           Assessment:          Diagnosis Plan   1. History of pericarditis  ECG 12 Lead      2. Essential hypertension               Plan:       Mr. Tee is a 49 y.o.  gentleman with past medical history notable for primary hypertriglyceridemia, vocal cord dysfunction status post tracheostomy, and impaired fasting glucose who presents for scheduled follow-up after episode of chest pain a few months back.  Overall doing well on pretty standard therapy can de-escalate colchicine to daily likely stop when we see him back in 3 months.    Pericarditis:  Had an episode in 2021 and recurrent episode 2024 both times with normal biomarkers  EKG at baseline typically shows early repolarization  Echocardiogram today shows no effusion clinically doing well on colchicine would continue current medical therapy will decrease colchicine from twice daily to daily and likely stop at 3 months when we see him back in the office    Essential hypertension:  Blood pressure well-controlled no changes needed at this time      Follow up:  3 months with JUANITA Franco MD  [facility]  01/16/25  11:51 EST

## 2025-01-22 ENCOUNTER — READMISSION MANAGEMENT (OUTPATIENT)
Dept: CALL CENTER | Facility: HOSPITAL | Age: 50
End: 2025-01-22
Payer: COMMERCIAL

## 2025-01-22 NOTE — OUTREACH NOTE
Medical Week 2 Survey      Flowsheet Row Responses   [facility] patient discharged from? [place]   Does the patient have one of the following disease processes/diagnoses(primary or secondary)? Other   Week 2 attempt successful? Yes   Call start time 1727   General alerts for this patient chronic tracheostomy   Discharge diagnosis Tracheal mass, Influenza A   Call end time 1731   Person spoke with today (if not patient) and relationship Mother   Is the patient taking all medications as directed (includes completed medication regime)? Yes   Does the patient have a primary care provider?  Yes   Has the patient kept scheduled appointments due by today? Yes   Psychosocial issues? No   What is the patient's perception of their health status since discharge? New symptoms unrelated to diagnosis   Is the patient/caregiver able to teach back signs and symptoms related to disease process for when to call PCP? Yes   Is the patient/caregiver able to teach back signs and symptoms related to disease process for when to call 911? Yes   Is the patient/caregiver able to teach back the hierarchy of who to call/visit for symptoms/problems? PCP, Specialist, Home health nurse, Urgent Care, ED, 911 Yes   If the patient is a current smoker, are they able to teach back resources for cessation? Not a smoker   Additional teach back comments States he had complaints of not able to hear out of rt ear.  They contacted PCP regarding this and they ordered ear drops and he started those.  If no improvement, they will ask for a referal to ENT .   Week 2 Call Completed? Yes   Wrap up additional comments If no improvement to ear, she will ask PCP to send referral to ENT   Call end time 1731            Danielle ARZATE - Licensed Nurse

## 2025-01-23 NOTE — ED NOTES
Nursing report ED to floor  Wang Tee  49 y.o.  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 Diagnoses of Tracheostomy status, Dehydration, and Influenza A were also pertinent to this visit.    Code status:   Current Code Status       Date Active Code Status Order ID Comments User Context       Prior            Allergies:   Quetiapine, Haloperidol, Amlodipine-valsartan-hctz, Aripiprazole, Olanzapine, Risperidone, Sulfa antibiotics, Sulfamethoxazole-trimethoprim, Valproic acid, Amlodipine, and Bupropion    Isolation:   Droplet    Intake and Output  No intake or output data in the 24 hours ending 01/04/25 2250    Weight:   There were no vitals filed for this visit.    Most recent vitals:   Vitals:    01/04/25 2110 01/04/25 2122 01/04/25 2137 01/04/25 2210   BP:   128/86    BP Location:       Patient Position:       Pulse: 63 61 64 59   Resp:   24    Temp:       SpO2: 93% 96% 91% 94%       Active LDAs/IV Access:   Lines, Drains & Airways       Active LDAs       Name Placement date Placement time Site Days    Peripheral IV 01/04/25 2110 Left Antecubital 01/04/25 2110  Antecubital  less than 1                    Labs (abnormal labs have a star):   Labs Reviewed   COMPREHENSIVE METABOLIC PANEL - Abnormal; Notable for the following components:       Result Value    Glucose 124 (*)     BUN 42 (*)     Creatinine 1.55 (*)     Sodium 134 (*)     Chloride 108 (*)     CO2 15.7 (*)     ALT (SGPT) 107 (*)     AST (SGOT) 62 (*)     BUN/Creatinine Ratio 27.1 (*)     eGFR 54.9 (*)     All other components within normal limits    Narrative:     GFR Categories in Chronic Kidney Disease (CKD)      GFR Category          GFR (mL/min/1.73)    Interpretation  G1                     90 or greater         Normal or high  (1)  G2                      60-89                Mild decrease (1)  G3a                   45-59                Mild to moderate decrease  G3b                   30-44                Moderate to severe decrease  G4                    15-29                Severe decrease  G5                    14 or less           Kidney failure          (1)In the absence of evidence of kidney disease, neither GFR category G1 or G2 fulfill the criteria for CKD.    eGFR calculation 2021 CKD-EPI creatinine equation, which does not include race as a factor   CBC WITH AUTO DIFFERENTIAL - Abnormal; Notable for the following components:    WBC 13.49 (*)     All other components within normal limits   MANUAL DIFFERENTIAL - Abnormal; Notable for the following components:    Neutrophil % 92.0 (*)     Lymphocyte % 4.0 (*)     Monocyte % 4.0 (*)     Eosinophil % 0.0 (*)     Neutrophils Absolute 12.41 (*)     Lymphocytes Absolute 0.54 (*)     All other components within normal limits   CBC AND DIFFERENTIAL    Narrative:     The following orders were created for panel order CBC & Differential.  Procedure                               Abnormality         Status                     ---------                               -----------         ------                     CBC Auto Differential[[id number]]        Abnormal            Final result                 Please view results for these tests on the individual orders.       EKG:   No orders to display       Meds given in ED:   Medications   sodium chloride 0.9 % bolus 1,000 mL (1,000 mL Intravenous New Bag 1/4/25 2120)   midazolam (VERSED) injection 2 mg (2 mg Intravenous Given 1/4/25 2122)   fentaNYL citrate (PF) (SUBLIMAZE) injection 50 mcg (50 mcg Intravenous Given 1/4/25 2122)       Imaging results:  No radiology results for the last day    Ambulatory status:   - ad ray    Social issues:   Social History     Socioeconomic History    Marital status: Single   Tobacco Use    Smoking status: Never     Smokeless tobacco: Never   Vaping Use    Vaping status: Never Used   Substance and Sexual Activity    Alcohol use: Not Currently     Comment: social    Drug use: No    Sexual activity: Not Currently     Partners: Male       Peripheral Neurovascular  Peripheral Neurovascular (Adult)  Peripheral Neurovascular WDL: WDL    Neuro Cognitive  Neuro Cognitive (Adult)  Cognitive/Neuro/Behavioral WDL: WDL, level of consciousness, orientation  Level of Consciousness: Alert  Orientation: oriented x 4    Learning       Respiratory  Respiratory  Airway WDL: .WDL except, airway symptoms  Airway Symptoms: artificial airway in place (Tracheostomy in place - chronic)  Respiratory WDL  Respiratory WDL: .WDL except, rhythm/pattern, expansion/retractions, cough  Rhythm/Pattern, Respiratory: labored, shortness of breath, tachypneic  Cough Frequency: frequent  Cough Type: bronchospastic, no productive sputum  Respiratory Secretions Assessment  Secretions Amount: small  Secretions Color: red  Secretions Characteristics: thin    Abdominal Pain       Pain Assessments  Pain (Adult)  (0-10) Pain Rating: Rest: 3  Pain Location: other (see comments) (trach)  Pain Onset/Duration: today  Pain Description: constant, aching  Pain Management Interventions: pain medication given  Response to Pain Interventions: nonverbal indicators absent/decreased  Pain Assessment Additional Detail  Pain Onset/Duration: today    NIH Stroke Scale       Anabelle Bunch RN  01/04/25 22:50 EST     Available

## 2025-01-27 DIAGNOSIS — I10 ESSENTIAL HYPERTENSION: ICD-10-CM

## 2025-01-27 RX ORDER — NEBIVOLOL 5 MG/1
5 TABLET ORAL DAILY
Qty: 30 TABLET | Refills: 0 | Status: SHIPPED | OUTPATIENT
Start: 2025-01-27

## 2025-01-31 ENCOUNTER — READMISSION MANAGEMENT (OUTPATIENT)
Dept: CALL CENTER | Facility: HOSPITAL | Age: 50
End: 2025-01-31
Payer: COMMERCIAL

## 2025-01-31 NOTE — OUTREACH NOTE
Medical Week 3 Survey      Flowsheet Row Responses   [facility] [place] patient discharged from? [place]   Does the patient have one of the following disease processes/diagnoses(primary or secondary)? Other   Week 3 attempt successful? Yes   Call start time 1104   Call end time 1105   General alerts for this patient chronic tracheostomy   Discharge diagnosis Tracheal mass, Influenza A   Is patient permission given to speak with other caregiver? Yes   Person spoke with today (if not patient) and relationship Mother   Meds reviewed with patient/caregiver? Yes   Is the patient having any side effects they believe may be caused by any medication additions or changes? No   Does the patient have all medications ordered at discharge? N/A   Is the patient taking all medications as directed (includes completed medication regime)? Yes   Does the patient have a primary care provider?  Yes   Comments regarding PCP PCP follow up 1/6/25   Does the patient have an appointment with their PCP within 7 days of discharge? Greater than 7 days   What is preventing the patient from scheduling follow up appointments within 7 days of discharge? Earlier appointment not available   Nursing Interventions Verified appointment date/time/provider   Has the patient kept scheduled appointments due by today? N/A   Has home health visited the patient within 72 hours of discharge? N/A   Psychosocial issues? No   Did the patient receive a copy of their discharge instructions? Yes   Nursing interventions Reviewed instructions with patient   What is the patient's perception of their health status since discharge? Improving   Is the patient/caregiver able to teach back signs and symptoms related to disease process for when to call PCP? Yes   Is the patient/caregiver able to teach back signs and symptoms related to disease process for when to call 911? Yes   Is the patient/caregiver able to teach back the hierarchy of who to call/visit for  symptoms/problems? PCP, Specialist, Home health nurse, Urgent Care, ED, 911 Yes   Week 3 Call Completed? Yes   Graduated Yes   Is the patient interested in additional calls from an ambulatory ? No   Would this patient benefit from a Referral to [facility] Social Work? No   Call end time 1105            Kassie HINES - Registered Nurse

## 2025-02-06 ENCOUNTER — OFFICE VISIT (OUTPATIENT)
Dept: INTERNAL MEDICINE | Facility: CLINIC | Age: 50
End: 2025-02-06
Payer: COMMERCIAL

## 2025-02-06 ENCOUNTER — TELEPHONE (OUTPATIENT)
Dept: INTERNAL MEDICINE | Facility: CLINIC | Age: 50
End: 2025-02-06

## 2025-02-06 VITALS
SYSTOLIC BLOOD PRESSURE: 110 MMHG | OXYGEN SATURATION: 98 % | DIASTOLIC BLOOD PRESSURE: 68 MMHG | BODY MASS INDEX: 39.55 KG/M2 | WEIGHT: 267 LBS | HEIGHT: 69 IN | HEART RATE: 68 BPM

## 2025-02-06 DIAGNOSIS — H60.311 ACUTE DIFFUSE OTITIS EXTERNA OF RIGHT EAR: Primary | ICD-10-CM

## 2025-02-06 DIAGNOSIS — M1A.0710 IDIOPATHIC CHRONIC GOUT OF RIGHT FOOT WITHOUT TOPHUS: ICD-10-CM

## 2025-02-06 DIAGNOSIS — I10 ESSENTIAL HYPERTENSION: ICD-10-CM

## 2025-02-06 DIAGNOSIS — R74.8 ELEVATED LIVER ENZYMES: ICD-10-CM

## 2025-02-06 DIAGNOSIS — H60.311 ACUTE DIFFUSE OTITIS EXTERNA OF RIGHT EAR: ICD-10-CM

## 2025-02-06 DIAGNOSIS — Z00.00 HEALTHCARE MAINTENANCE: Primary | ICD-10-CM

## 2025-02-06 PROCEDURE — 99396 PREV VISIT EST AGE 40-64: CPT | Performed by: NURSE PRACTITIONER

## 2025-02-06 PROCEDURE — 99213 OFFICE O/P EST LOW 20 MIN: CPT | Performed by: NURSE PRACTITIONER

## 2025-02-06 RX ORDER — NEOMYCIN SULFATE, POLYMYXIN B SULFATE, HYDROCORTISONE 3.5; 10000; 1 MG/ML; [USP'U]/ML; MG/ML
3 SOLUTION/ DROPS AURICULAR (OTIC) 4 TIMES DAILY
Qty: 10 ML | Refills: 0 | Status: SHIPPED | OUTPATIENT
Start: 2025-02-06

## 2025-02-06 RX ORDER — CIPROFLOXACIN/HYDROCORTISONE 0.2 %-1 %
3 SUSPENSION, DROPS(FINAL DOSAGE FORM)(ML) OTIC (EAR) 2 TIMES DAILY
Qty: 10 ML | Refills: 0 | Status: SHIPPED | OUTPATIENT
Start: 2025-02-06 | End: 2025-02-06

## 2025-02-06 RX ORDER — NEBIVOLOL 5 MG/1
5 TABLET ORAL DAILY
Qty: 90 TABLET | Refills: 3 | Status: SHIPPED | OUTPATIENT
Start: 2025-02-06

## 2025-02-06 NOTE — PROGRESS NOTES
"Subjective   Wang Tee is a 49 y.o. male and is here for a comprehensive physical exam. The patient reports no problems. He was recently admitted for influenza A and a tracheal mass. He has followed up with pulmonology. He is feeling better.     C/o right ear pain.  States that it was feeling full.  He has used Debrox and states that he can hear better now, but he does still have pain in that ear    Do you take any herbs or supplements that were not prescribed by a doctor? no    Patient is here to follow up on hypertension which is controlled with current medications. Denies chest pain, headaches.     He is having surgery on 2/28/2025 to have his tracheostomy removed. Dr. Pichardo at     The following portions of the patient's history were reviewed and updated as appropriate: allergies, current medications, past family history, past medical history, past social history, past surgical history and problem list.    Review of Systems  Do you have pain that bothers you in your daily life? no  Pertinent items are noted in HPI.    Objective   /68   Pulse 68   Ht 175.3 cm (69.02\")   Wt 121 kg (267 lb)   SpO2 98%   BMI 39.41 kg/m²     General Appearance:    Alert, cooperative, no distress, appears stated age   Head:    Normocephalic, without obvious abnormality, atraumatic   Eyes:    PERRL, conjunctiva/corneas clear, EOM's intact, both eyes   Ears:    Normal TM's and external ear canal left side; right side with erythema   Nose:   Nares normal, septum midline, mucosa normal, no drainage    or sinus tenderness   Throat:   Lips, mucosa, and tongue normal; teeth and gums normal   Neck:   Supple, symmetrical, trachea midline, no adenopathy; tracheostomy in place    thyroid:  no enlargement/tenderness/nodules; no carotid    bruit   Back:     Symmetric, no curvature, ROM normal, no CVA tenderness   Lungs:     Clear to auscultation bilaterally, respirations unlabored   Chest Wall:    No tenderness or deformity    " Heart:    Regular rate and rhythm, S1 and S2 normal, no murmur       Abdomen:     Soft, non-tender, bowel sounds active all four quadrants,     no masses, no organomegaly           Extremities:   Extremities normal, atraumatic, no cyanosis or edema   Pulses:   2+ and symmetric all extremities   Skin:   Skin color, texture, turgor normal, no rashes or lesions   Lymph nodes:   Cervical, supraclavicular, and axillary nodes normal   Neurologic:   Grossly intact, normal strength, sensation and reflexes     throughout        No visits with results within 2 Week(s) from this visit.   Latest known visit with results is:   Results Encounter on 01/19/2025   Component Date Value Ref Range Status    WBC 01/30/2025 7.12  3.40 - 10.80 10*3/mm3 Final    RBC 01/30/2025 4.74  4.14 - 5.80 10*6/mm3 Final    Hemoglobin 01/30/2025 14.5  13.0 - 17.7 g/dL Final    Hematocrit 01/30/2025 42.9  37.5 - 51.0 % Final    MCV 01/30/2025 90.5  79.0 - 97.0 fL Final    MCH 01/30/2025 30.6  26.6 - 33.0 pg Final    MCHC 01/30/2025 33.8  31.5 - 35.7 g/dL Final    RDW 01/30/2025 13.7  12.3 - 15.4 % Final    Platelets 01/30/2025 253  140 - 450 10*3/mm3 Final    Glucose 01/30/2025 80  65 - 99 mg/dL Final    BUN 01/30/2025 12  6 - 20 mg/dL Final    Creatinine 01/30/2025 1.23  0.76 - 1.27 mg/dL Final    EGFR Result 01/30/2025 72.4  >60.0 mL/min/1.73 Final    Comment: GFR Categories in Chronic Kidney Disease (CKD)/X09/  /X09/  GFR Category          GFR (mL/min/1.73)    Interpretation  G1/X09/                    90 or greater/X09/        Normal or high  (1)  G2//                    60-89                Mild decrease  (1)  G3a                   45-59                Mild to moderate  decrease  G3b                   30-44                Moderate to  severe decrease  G4                    15-29                Severe decrease  G5                    14 or less           Kidney failure//  /[id number]/  (1)In the absence of evidence of kidney disease,  neither  GFR category G1 or G2 fulfill the criteria for CKD.  eGFR calculation 2021 CKD-EPI creatinine equation, which  does not include race as a factor      BUN/Creatinine Ratio 01/30/2025 9.8  7.0 - 25.0 Final    Sodium 01/30/2025 140  136 - 145 mmol/L Final    Potassium 01/30/2025 4.1  3.5 - 5.2 mmol/L Final    Chloride 01/30/2025 104  98 - 107 mmol/L Final    Total CO2 01/30/2025 26.1  22.0 - 29.0 mmol/L Final    Calcium 01/30/2025 9.9  8.6 - 10.5 mg/dL Final    Total Protein 01/30/2025 7.0  6.0 - 8.5 g/dL Final    Albumin 01/30/2025 4.1  3.5 - 5.2 g/dL Final    Globulin 01/30/2025 2.9  gm/dL Final    A/G Ratio 01/30/2025 1.4  g/dL Final    Total Bilirubin 01/30/2025 2.3 (H)  0.0 - 1.2 mg/dL Final    Alkaline Phosphatase 01/30/2025 75  39 - 117 U/L Final    AST (SGOT) 01/30/2025 42 (H)  1 - 40 U/L Final    ALT (SGPT) 01/30/2025 67 (H)  1 - 41 U/L Final    Total Cholesterol 01/30/2025 159  0 - 200 mg/dL Final    Comment: Cholesterol Reference Ranges  (U.S. Department of Health and Human Services ATP III  Classifications)  Desirable          <200 mg/dL  Borderline High    200-239 mg/dL  High Risk          >240 mg/dL  Triglyceride Reference Ranges  (U.S. Department of Health and Human Services ATP III  Classifications)  Normal           <150 mg/dL  Borderline High  150-199 mg/dL  High             200-499 mg/dL  Very High        >500 mg/dL  HDL Reference Ranges  (U.S. Department of Health and Human Services ATP III  Classifications)  Low     <40 mg/dl (major risk factor for CHD)  High    >60 mg/dl ('negative' risk factor for CHD)  LDL Reference Ranges  (U.S. Department of Health and Human Services ATP III  Classifications)  Optimal          <100 mg/dL  Near Optimal     100-129 mg/dL  Borderline High  130-159 mg/dL  High             160-189 mg/dL  Very High        >189 mg/dL  LDL is calculated using the NIH LDL-C calculation.      Triglycerides 01/30/2025 119  0 - 150 mg/dL Final    HDL Cholesterol 01/30/2025 37 (L)   40 - 60 mg/dL Final    VLDL Cholesterol [place] 01/30/2025 22  5 - 40 mg/dL Final    LDL Chol Calc (NIH) 01/30/2025 100  0 - 100 mg/dL Final    Chol/HDL Ratio 01/30/2025 4.30   Final    Hemoglobin A1C 01/30/2025 5.20  4.80 - 5.60 % Final    Comment: Hemoglobin A1C Ranges:  Increased Risk for Diabetes  5.7% to 6.4%  Diabetes                     >= 6.5%  Diabetic Goal                < 7.0%      TSH 01/30/2025 0.179 (L)  0.270 - 4.200 uIU/mL Final    25 Hydroxy, Vitamin D 01/30/2025 33.7  30.0 - 100.0 ng/ml Final    Comment: Reference Range for Total Vitamin D 25(OH)  Deficiency <20.0 ng/mL  Insufficiency 21-29 ng/mL  Sufficiency  ng/mL  Toxicity >100 ng/ml      Free T4 01/30/2025 1.18  0.92 - 1.68 ng/dL Final    Vitamin B-12 01/30/2025 695  211 - 946 pg/mL Final    Results may be falsely increased if patient taking Biotin.    Uric Acid 01/30/2025 7.9 (H)  3.4 - 7.0 mg/dL Final     Reviewed labs with patient.     Assessment & Plan   Healthy male exam.      1. Diagnoses and all orders for this visit:    1. Healthcare maintenance (Primary)    2. Essential hypertension  -     nebivolol (BYSTOLIC) 5 MG tablet; Take 1 tablet by mouth Daily.  Dispense: 90 tablet; Refill: 3    3. Idiopathic chronic gout of right foot without tophus    4. Elevated liver enzymes    5. Acute diffuse otitis externa of right ear  -     ciprofloxacin-hydrocortisone (Cipro HC) 0.2-1 % otic suspension; Administer 3 drops to the right ear 2 (Two) Times a Day.  Dispense: 10 mL; Refill: 0    HTN - continue bystolic 5 mg daily and losartan 100 mg daily.     Gout  - continue allopurinol 300 mg daily. Encouraged increased water intake.  Patient is also on colchicine, but this is for his history of pericarditis    Elevated liver enzymes - patient has an upcoming appointment with GI    Right otitis externa - will treat with cipro-hydrocortisone as noted above    2. Patient Counseling:  --Nutrition: Stressed importance of moderation in sodium/caffeine  intake, saturated fat and cholesterol, caloric balance, sufficient intake of fresh fruits, vegetables, fiber, calcium, iron. Decreased carbs   --Exercise: Stressed the importance of regular exercise.   --Dental health: Discussed importance of regular tooth brushing, flossing, and dental visits.  --Immunizations reviewed. Recently was ill with flu A    3. Discussed the patient's BMI with him.  The BMI is above average; BMI management plan is completed  Class 3 Severe Obesity (BMI >=40). Obesity-related health conditions include the following: hypertension. Obesity is improving with lifestyle modifications. BMI is is above average; BMI management plan is completed. We discussed portion control and increasing exercise.       4. Follow up  in 6 months with fasting labs prior

## 2025-02-06 NOTE — TELEPHONE ENCOUNTER
Pharmacy Name:  [facility]      Pharmacy representative phone number:[phone]    What medication are you calling in regards to: ciprofloxacin-hydrocortisone (Cipro HC) 0.2-1 % otic suspension     What question does the pharmacy have: THE COST IS $134.  PATIENT WOULD LIKE SOMETHING ELSE CALLED IN PLEASE.     Who is the provider that prescribed the medication: WALKER    Additional notes:

## 2025-04-09 DIAGNOSIS — M1A.0790 CHRONIC GOUT OF FOOT, UNSPECIFIED CAUSE, UNSPECIFIED LATERALITY: ICD-10-CM

## 2025-04-09 RX ORDER — ALLOPURINOL 300 MG/1
300 TABLET ORAL DAILY
Qty: 90 TABLET | Refills: 0 | Status: SHIPPED | OUTPATIENT
Start: 2025-04-09

## 2025-04-16 ENCOUNTER — OFFICE VISIT (OUTPATIENT)
Dept: CARDIOLOGY | Age: 50
End: 2025-04-16
Payer: COMMERCIAL

## 2025-04-16 VITALS
HEART RATE: 60 BPM | WEIGHT: 271 LBS | HEIGHT: 69 IN | SYSTOLIC BLOOD PRESSURE: 134 MMHG | BODY MASS INDEX: 40.14 KG/M2 | DIASTOLIC BLOOD PRESSURE: 76 MMHG

## 2025-04-16 DIAGNOSIS — Z86.79 HISTORY OF PERICARDITIS: ICD-10-CM

## 2025-04-16 DIAGNOSIS — I10 ESSENTIAL HYPERTENSION: ICD-10-CM

## 2025-04-16 DIAGNOSIS — E78.1 PRIMARY HYPERTRIGLYCERIDEMIA: Primary | ICD-10-CM

## 2025-04-16 PROCEDURE — 99214 OFFICE O/P EST MOD 30 MIN: CPT | Performed by: NURSE PRACTITIONER

## 2025-04-16 PROCEDURE — 93000 ELECTROCARDIOGRAM COMPLETE: CPT | Performed by: NURSE PRACTITIONER

## 2025-04-16 NOTE — PROGRESS NOTES
Subjective:     Encounter Date:04/16/2025      Patient ID: Wang Tee is a 50 y.o. male.    Chief Complaint:follow up pericarditis  History of Present Illness  This is a 50-year-old man who follows with Dr. Franco and is new to me today.  He has a past medical history of primary hypertriglyceridemia, vocal cord dysfunction status post tracheostomy, impaired fasting glucose and pericarditis.    He is here today for follow-up visit.  When he saw Dr. Franco 3 months ago his pericarditis was improving and his colchicine was decreased to daily.  Since that visit he continues to deny any chest discomfort.  No worsening shortness of breath, palpitations, dizziness or syncope.  He has noticed lately some fine tremors and shaking in his right hand.  He notices it more when he is drawing or when he was eating with chopsticks last night.  Reports feeling very fatigued.  He says that since getting his trach he has not slept in a solid night.  He usually wakes up every couple of hours.  He has no energy.  He is trying to work on increasing his activity.  Prior to when he got his trach he was being treated for sleep apnea with a CPAP.  He does not follow with sleep medicine anymore or a pulmonologist.    I have reviewed and updated as appropriate allergies, current medications, past family history, past medical history, past surgical history and problem list.    Review of Systems   Constitutional: Positive for malaise/fatigue. Negative for fever, weight gain and weight loss.   HENT:  Negative for congestion, hoarse voice and sore throat.    Eyes:  Negative for blurred vision and double vision.   Cardiovascular:  Negative for chest pain, dyspnea on exertion, leg swelling, orthopnea, palpitations and syncope.   Respiratory:  Negative for cough, shortness of breath and wheezing.    Gastrointestinal:  Negative for abdominal pain, hematemesis, hematochezia and melena.   Genitourinary:  Negative for dysuria and hematuria.    Neurological:  Negative for dizziness, headaches, light-headedness and numbness.   Psychiatric/Behavioral:  Negative for depression. The patient is not nervous/anxious.          Current Outpatient Medications:     allopurinol (ZYLOPRIM) 300 MG tablet, Take 1 tablet by mouth once daily, Disp: 90 tablet, Rfl: 0    ascorbic acid (VITAMIN C) 500 MG tablet, Take 1 tablet by mouth Daily. (Patient taking differently: Take 1 tablet by mouth Daily As Needed (causes gout pain).), Disp: , Rfl:     cholecalciferol (VITAMIN D3) 10 MCG (400 UNIT) tablet, Take  by mouth Daily., Disp: , Rfl:     clonazePAM (KlonoPIN) 0.5 MG tablet, Take 1 tablet by mouth At Night As Needed., Disp: , Rfl:     co-enzyme Q-10 30 MG capsule, Take  by mouth Daily., Disp: , Rfl:     colchicine 0.6 MG tablet, Take 1 tablet by mouth Daily., Disp: 60 tablet, Rfl: 2    cyclobenzaprine (FLEXERIL) 10 MG tablet, Take 1 tablet by mouth 3 (Three) Times a Day As Needed. for muscle spams, Disp: , Rfl:     guaiFENesin (MUCINEX) 600 MG 12 hr tablet, Take 2 tablets by mouth 2 (Two) Times a Day., Disp: 30 tablet, Rfl: 0    hydrOXYzine (ATARAX) 25 MG tablet, Take 1 tablet by mouth Daily As Needed for Anxiety., Disp: , Rfl:     lithium 600 MG capsule, Take 1 capsule by mouth 2 (Two) Times a Day. 600 PM (Patient taking differently: Take 2 capsules by mouth Daily. 600 PM), Disp: , Rfl: 0    loperamide (IMODIUM) 2 MG capsule, Take 1 capsule by mouth 4 (Four) Times a Day As Needed for Diarrhea., Disp: 20 capsule, Rfl: 0    losartan (COZAAR) 100 MG tablet, Take 1 tablet by mouth Daily., Disp: 90 tablet, Rfl: 2    Melatonin 10 MG tablet, Take 1 tablet by mouth At Night As Needed., Disp: , Rfl:     multivitamin with minerals tablet tablet, Take 1 tablet by mouth Daily., Disp: , Rfl:     mupirocin (BACTROBAN) 2 % ointment, Apply 1 Application topically to the appropriate area as directed Daily. PRN, Disp: , Rfl:     nebivolol (BYSTOLIC) 5 MG tablet, Take 1 tablet by mouth  Daily., Disp: 90 tablet, Rfl: 3    neomycin-polymyxin-hydrocortisone (CORTISPORIN) 3.5-61016-5 otic solution, Administer 3 drops to the right ear 4 (Four) Times a Day., Disp: 10 mL, Rfl: 0    triamcinolone (KENALOG) 0.1 % cream, Apply 1 Application topically to the appropriate area as directed 2 (Two) Times a Day., Disp: , Rfl:     Vraylar 1.5 MG capsule capsule, Take 1 capsule by mouth Daily., Disp: , Rfl:     albuterol (PROVENTIL) (2.5 MG/3ML) 0.083% nebulizer solution, Take 2.5 mg by nebulization Every 4 (Four) Hours As Needed for Wheezing. (Patient not taking: Reported on 4/16/2025), Disp: 90 each, Rfl: 0    Past Medical History:   Diagnosis Date    Abnormal serum thyroxine (T4) level     Bipolar affective     Depression 1992    Disorder of vocal cord     vocal cord dystonia    Elevated liver enzymes     Gout     Gout     History of medical problems 1992    Bipolar    Hypertension        Past Surgical History:   Procedure Laterality Date    BRONCHOSCOPY N/A 1/5/2025    Procedure: BRONCHOSCOPY with cryo;  Surgeon: Germán Warren MD;  Location: [facility] ENDOSCOPY;  Service: Pulmonary;  Laterality: N/A;  pre: mucous plugs  post: same    TRACHEOSTOMY         Family History   Problem Relation Age of Onset    Hypertension Mother     Arthritis Mother     Diabetes Mother     Kidney disease Mother     Hypertension Father     Anxiety disorder Other     Bipolar disorder Other        Social History     Tobacco Use    Smoking status: Never    Smokeless tobacco: Never   Vaping Use    Vaping status: Never Used   Substance Use Topics    Alcohol use: Not Currently     Comment: social    Drug use: No         ECG 12 Lead    Date/Time: 4/16/2025 2:23 PM  Performed by: Roxana Espinoza APRN    Authorized by: Roxana Espinoza APRN  Comparison: compared with previous ECG from 1/16/2025  Similar to previous ECG  Rhythm: sinus rhythm             Objective:     Visit Vitals  /76 (BP Location: Right arm, Patient Position: Sitting, Cuff  "Size: Adult)   Pulse 60   Ht 175.3 cm (69\")   Wt 123 kg (271 lb)   BMI 40.02 kg/m²             Physical Exam  Constitutional:       Appearance: Normal appearance. He is obese.   HENT:      Head: Normocephalic.   Neck:      Vascular: No carotid bruit.      Trachea: Tracheostomy present.   Cardiovascular:      Rate and Rhythm: Normal rate and regular rhythm.      Chest Wall: PMI is not displaced.      Pulses: Normal pulses.           Radial pulses are 2+ on the right side and 2+ on the left side.        Posterior tibial pulses are 2+ on the right side.      Heart sounds: Normal heart sounds. No murmur heard.     No friction rub. No gallop.   Pulmonary:      Effort: Pulmonary effort is normal.      Breath sounds: Normal breath sounds.   Abdominal:      General: Bowel sounds are normal. There is no distension.      Palpations: Abdomen is soft.   Musculoskeletal:      Right lower leg: No edema.      Left lower leg: No edema.   Skin:     General: Skin is warm and dry.      Capillary Refill: Capillary refill takes less than 2 seconds.   Neurological:      Mental Status: He is alert and oriented to person, place, and time.   Psychiatric:         Mood and Affect: Mood normal.         Behavior: Behavior normal.         Thought Content: Thought content normal.          Lab Review:   Lipid Panel          1/30/2025    10:53   Lipid Panel   Total Cholesterol 159    Triglycerides 119    HDL Cholesterol 37    VLDL Cholesterol 22    LDL Cholesterol  100          Cardiac Procedures:       Assessment:         Diagnoses and all orders for this visit:    1. Primary hypertriglyceridemia (Primary)    2. History of pericarditis    3. Essential hypertension            Plan:       Fatigue: I do not feel this is cardiac related.  Echocardiogram recently was unremarkable.  EKG is stable.  He was diagnosed with sleep apnea and was wearing his CPAP prior to getting a tracheostomy.  No longer follows with sleep medicine.  I do not know if he " would be a candidate for inspire.  I recommended that he discuss this with his ENT or PCP.  Pericarditis: He denies any concerning symptoms.  EKG is normal.  Recommend stopping colchicine now and see how he does.  Have asked him to call with any recurrent symptoms.  Hypertension: Blood pressure is well-controlled on current regimen no changes  Tremors: He has a follow-up with his PCP coming up.  Recommended that he discuss this with her.    Thank you for allowing me to participate in this patient's care. Please call with any questions or concerns. Mr. Tee will follow up with Dr. Franco in 6 months.          Your medication list            Accurate as of April 16, 2025 10:08 AM. If you have any questions, ask your nurse or doctor.                CHANGE how you take these medications        Instructions Last Dose Given Next Dose Due   ascorbic acid 500 MG tablet  Commonly known as: VITAMIN C  What changed:   when to take this  reasons to take this      Take 1 tablet by mouth Daily.       lithium 600 MG capsule  What changed: when to take this      Take 1 capsule by mouth 2 (Two) Times a Day. 600 PM              CONTINUE taking these medications        Instructions Last Dose Given Next Dose Due   albuterol (2.5 MG/3ML) 0.083% nebulizer solution  Commonly known as: PROVENTIL      Take 2.5 mg by nebulization Every 4 (Four) Hours As Needed for Wheezing.       allopurinol 300 MG tablet  Commonly known as: ZYLOPRIM      Take 1 tablet by mouth once daily       cholecalciferol 10 MCG (400 UNIT) tablet  Commonly known as: VITAMIN D3      Take  by mouth Daily.       clonazePAM 0.5 MG tablet  Commonly known as: KlonoPIN      Take 1 tablet by mouth At Night As Needed.       co-enzyme Q-10 30 MG capsule      Take  by mouth Daily.       colchicine 0.6 MG tablet      Take 1 tablet by mouth Daily.       cyclobenzaprine 10 MG tablet  Commonly known as: FLEXERIL      Take 1 tablet by mouth 3 (Three) Times a Day As Needed. for  muscle spams       guaiFENesin 600 MG 12 hr tablet  Commonly known as: MUCINEX      Take 2 tablets by mouth 2 (Two) Times a Day.       hydrOXYzine 25 MG tablet  Commonly known as: ATARAX      Take 1 tablet by mouth Daily As Needed for Anxiety.       loperamide 2 MG capsule  Commonly known as: IMODIUM      Take 1 capsule by mouth 4 (Four) Times a Day As Needed for Diarrhea.       losartan 100 MG tablet  Commonly known as: COZAAR      Take 1 tablet by mouth Daily.       Melatonin 10 MG tablet      Take 1 tablet by mouth At Night As Needed.       multivitamin with minerals tablet tablet      Take 1 tablet by mouth Daily.       mupirocin 2 % ointment  Commonly known as: BACTROBAN      Apply 1 Application topically to the appropriate area as directed Daily. PRN       nebivolol 5 MG tablet  Commonly known as: BYSTOLIC      Take 1 tablet by mouth Daily.       neomycin-polymyxin-hydrocortisone 3.5-62459-5 otic solution  Commonly known as: CORTISPORIN      Administer 3 drops to the right ear 4 (Four) Times a Day.       triamcinolone 0.1 % cream  Commonly known as: KENALOG      Apply 1 Application topically to the appropriate area as directed 2 (Two) Times a Day.       Vraylar 1.5 MG capsule capsule  Generic drug: Cariprazine HCl      Take 1 capsule by mouth Daily.                  Roxana Espinoza, JUANITA  04/16/25  10:08 AM EDT

## 2025-04-17 ENCOUNTER — OFFICE VISIT (OUTPATIENT)
Dept: INTERNAL MEDICINE | Facility: CLINIC | Age: 50
End: 2025-04-17
Payer: COMMERCIAL

## 2025-04-17 VITALS
TEMPERATURE: 98.4 F | BODY MASS INDEX: 39.93 KG/M2 | SYSTOLIC BLOOD PRESSURE: 120 MMHG | DIASTOLIC BLOOD PRESSURE: 74 MMHG | OXYGEN SATURATION: 97 % | HEART RATE: 71 BPM | HEIGHT: 69 IN | WEIGHT: 269.6 LBS

## 2025-04-17 DIAGNOSIS — G47.30 SLEEP APNEA, UNSPECIFIED TYPE: Primary | ICD-10-CM

## 2025-04-17 PROCEDURE — 99213 OFFICE O/P EST LOW 20 MIN: CPT | Performed by: NURSE PRACTITIONER

## 2025-04-17 NOTE — PROGRESS NOTES
Subjective   Wang Tee is a 50 y.o. male. Patient is here today for   Chief Complaint   Patient presents with    Letter for School/Work   .    History of Present Illness   Patient is needing paperwork filled out for work.   He has been out of work since January. He is wanting to go back 4 hours at a time.   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     He is also needing a referral to sleep medicine. He is wanting to inquire about the Inspire device for sleep apnea. He has been diagnosed with sleep apnea, but due to his tracheostomy, cannot use the traditional face masks    The following portions of the patient's history were reviewed and updated as appropriate: allergies, current medications, past family history, past medical history, past social history, past surgical history and problem list.    Review of Systems    Objective   Vitals:    04/17/25 1333   BP: 120/74   Pulse: 71   Temp: 98.4 °F (36.9 °C)   SpO2: 97%     Body mass index is 39.81 kg/m².  Physical Exam  Vitals and nursing note reviewed.   Constitutional:       Appearance: Normal appearance. He is well-developed.   Cardiovascular:      Rate and Rhythm: Normal rate and regular rhythm.      Heart sounds: Normal heart sounds.   Pulmonary:      Effort: Pulmonary effort is normal.      Breath sounds: Normal breath sounds.   Skin:     General: Skin is warm and dry.   Neurological:      Mental Status: He is alert and oriented to person, place, and time.   Psychiatric:         Speech: Speech normal.         Behavior: Behavior normal.         Thought Content: Thought content normal.         Assessment & Plan   Diagnoses and all orders for this visit:    1. Sleep apnea, unspecified type (Primary)  -     Ambulatory Referral to Sleep Medicine      Completed paperwork for him to return to work 4 hours a day. No restrictions for those 4 hours.     Sleep apnea - will refer to sleep medicine

## 2025-07-05 DIAGNOSIS — M1A.0790 CHRONIC GOUT OF FOOT, UNSPECIFIED CAUSE, UNSPECIFIED LATERALITY: ICD-10-CM

## 2025-07-07 RX ORDER — ALLOPURINOL 300 MG/1
300 TABLET ORAL DAILY
Qty: 90 TABLET | Refills: 1 | Status: SHIPPED | OUTPATIENT
Start: 2025-07-07

## 2025-08-07 ENCOUNTER — OFFICE VISIT (OUTPATIENT)
Dept: INTERNAL MEDICINE | Facility: CLINIC | Age: 50
End: 2025-08-07
Payer: COMMERCIAL

## 2025-08-07 VITALS
TEMPERATURE: 98.4 F | DIASTOLIC BLOOD PRESSURE: 66 MMHG | HEART RATE: 75 BPM | BODY MASS INDEX: 39.53 KG/M2 | OXYGEN SATURATION: 97 % | WEIGHT: 266.9 LBS | HEIGHT: 69 IN | SYSTOLIC BLOOD PRESSURE: 94 MMHG

## 2025-08-07 DIAGNOSIS — M1A.0790 CHRONIC GOUT OF FOOT, UNSPECIFIED CAUSE, UNSPECIFIED LATERALITY: ICD-10-CM

## 2025-08-07 DIAGNOSIS — R74.8 ELEVATED LIVER ENZYMES: ICD-10-CM

## 2025-08-07 DIAGNOSIS — I10 ESSENTIAL HYPERTENSION: Primary | ICD-10-CM

## 2025-08-12 ENCOUNTER — RESULTS FOLLOW-UP (OUTPATIENT)
Dept: INTERNAL MEDICINE | Facility: CLINIC | Age: 50
End: 2025-08-12
Payer: COMMERCIAL

## (undated) DEVICE — TUBING, SUCTION, 1/4" X 10', STRAIGHT: Brand: MEDLINE

## (undated) DEVICE — MSK AIRWY LARYNG LMA PILOT SZ4

## (undated) DEVICE — Device

## (undated) DEVICE — SENSR O2 OXIMAX FNGR A/ 18IN NONSTR

## (undated) DEVICE — ADAPT SWVL FIBROPTIC BRONCH

## (undated) DEVICE — ADAPT CLN BIOGUARD AIR/H2O DISP

## (undated) DEVICE — SINGLE USE BIOPSY VALVE MAJ-210: Brand: SINGLE USE BIOPSY VALVE (STERILE)

## (undated) DEVICE — Device: Brand: ERBE

## (undated) DEVICE — SINGLE USE SUCTION VALVE MAJ-209: Brand: SINGLE USE SUCTION VALVE (STERILE)

## (undated) DEVICE — TRAP,MUCUS SPECIMEN, 80CC: Brand: MEDLINE